# Patient Record
Sex: FEMALE | Race: WHITE | Employment: FULL TIME | URBAN - METROPOLITAN AREA
[De-identification: names, ages, dates, MRNs, and addresses within clinical notes are randomized per-mention and may not be internally consistent; named-entity substitution may affect disease eponyms.]

---

## 2017-02-06 ENCOUNTER — TELEPHONE (OUTPATIENT)
Dept: NEUROLOGY | Facility: CLINIC | Age: 27
End: 2017-02-06

## 2017-02-10 ENCOUNTER — OFFICE VISIT (OUTPATIENT)
Dept: NEUROLOGY | Facility: CLINIC | Age: 27
End: 2017-02-10

## 2017-02-10 VITALS
DIASTOLIC BLOOD PRESSURE: 82 MMHG | RESPIRATION RATE: 14 BRPM | HEART RATE: 82 BPM | WEIGHT: 157 LBS | BODY MASS INDEX: 26 KG/M2 | SYSTOLIC BLOOD PRESSURE: 110 MMHG

## 2017-02-10 DIAGNOSIS — G43.009 MIGRAINE WITHOUT AURA AND WITHOUT STATUS MIGRAINOSUS, NOT INTRACTABLE: Primary | ICD-10-CM

## 2017-02-10 PROCEDURE — 99214 OFFICE O/P EST MOD 30 MIN: CPT | Performed by: OTHER

## 2017-02-10 RX ORDER — METOPROLOL SUCCINATE 50 MG/1
50 TABLET, EXTENDED RELEASE ORAL DAILY
Qty: 30 TABLET | Refills: 0 | Status: SHIPPED | OUTPATIENT
Start: 2017-02-10 | End: 2017-04-02 | Stop reason: ALTCHOICE

## 2017-02-10 RX ORDER — SUMATRIPTAN 5 MG/1
1 SPRAY NASAL EVERY 2 HOUR PRN
Qty: 1 EACH | Refills: 2 | Status: SHIPPED | OUTPATIENT
Start: 2017-02-10 | End: 2018-02-02

## 2017-02-10 NOTE — PROGRESS NOTES
Patient here to follow up regarding migraines. States that migraines may be slightly better since last visit. Here to discuss the next steps.

## 2017-02-10 NOTE — PROGRESS NOTES
Neurology Clinic Note    Mariia Trujillo Patient Status:  No patient class for patient encounter    1990 MRN [id number]   Location [facility] Attending No att. providers found   Hosp Day #  PCP Ana Hernandez MD     Subjective:  Initial HP migraine headaches per month. HA are usually L sided. She has no visual aura. Her headaches lasted several hours to all day.  She states that she has a job which requires alternating sleeping schedule and she feels that she has more headaches following napp hyperactivity(314.01) 2/2011   • Other postprocedural status(V45.89) 9/2010   • Unspecified sinusitis (chronic) 9/2010     recurrent   • Attention deficit disorder without mention of hyperactivity 9/2010   • Palpitations    • Overdose 11/11   • Headache(78 urination or difficulty urinating   Heme: no new bruising, no unexplained fevers or chills  Endocrine: no unexplained weight loss or gain, no new fatigue  Musculoskeletal: denies back pain, denies joint pain  Psych: denies depression   Skin: denies any new lower end of normal so we will have to watch this. HEr other psychiatric medications limit use of TCA's and SNRI's for headache. She is interested in trying metoprolol as she has a friend whom takes this with good relief of HA pain. Plan:  1.  Headache

## 2017-02-10 NOTE — PATIENT INSTRUCTIONS
Refill policies:    • Allow 2 business days for refills; controlled substances may take longer.   • Contact your pharmacy at least 5 days prior to running out of medication and have them send an electronic request or submit request through the “request re your physician has recommended that you have a procedure or additional testing performed. [facility] will contact your insurance carrier to obtain pre-certification or prior authorization.     Unfortunately, ELIDIA has seen an increas

## 2017-04-02 ENCOUNTER — OFFICE VISIT (OUTPATIENT)
Dept: FAMILY MEDICINE CLINIC | Facility: CLINIC | Age: 27
End: 2017-04-02

## 2017-04-02 VITALS
HEART RATE: 104 BPM | OXYGEN SATURATION: 98 % | WEIGHT: 157 LBS | BODY MASS INDEX: 26.16 KG/M2 | SYSTOLIC BLOOD PRESSURE: 112 MMHG | TEMPERATURE: 98 F | HEIGHT: 65 IN | RESPIRATION RATE: 18 BRPM | DIASTOLIC BLOOD PRESSURE: 70 MMHG

## 2017-04-02 DIAGNOSIS — J01.01 ACUTE RECURRENT MAXILLARY SINUSITIS: Primary | ICD-10-CM

## 2017-04-02 PROCEDURE — 99213 OFFICE O/P EST LOW 20 MIN: CPT | Performed by: NURSE PRACTITIONER

## 2017-04-02 RX ORDER — AMOXICILLIN AND CLAVULANATE POTASSIUM 875; 125 MG/1; MG/1
1 TABLET, FILM COATED ORAL 2 TIMES DAILY
Qty: 20 TABLET | Refills: 0 | Status: SHIPPED | OUTPATIENT
Start: 2017-04-02 | End: 2017-04-12

## 2017-04-02 NOTE — PROGRESS NOTES
CHIEF COMPLAINT:   Patient presents with:  Cough: congestion, sinus pain x 5 days      HPI:   Mary Caldwell is a 28year old female who presents for sinus symptoms for about 1  Week now. Symptoms have been worsening since onset.  Sinus pain/pressure is l recurrent   • Attention deficit disorder without mention of hyperactivity 9/2010   • Palpitations    • Overdose 11/11   • Headache 02/1999   • Self-mutilation 2003   • Anxiety attack 9/8/09   • Sinusitis 3/29/07   • Tobacco dependence    • Pharyngitis 3/ /70 mmHg  Pulse 104  Temp(Src) 98 °F (36.7 °C) (Oral)  Resp 18  Ht 65\"  Wt 157 lb  BMI 26.13 kg/m2  SpO2 98%  LMP 03/13/2017  GENERAL: well developed, well nourished, and in no apparent distress  SKIN: no rashes, no suspicious lesions  HEAD: atrauma Patient Instructions       Acute Bacterial Rhinosinusitis (ABRS)  Acute bacterial rhinosinusitis (ABRS) is an infection of your nasal cavity and sinuses. It’s caused by bacteria. Acute means that you’ve had symptoms for less than 12 weeks.   Understanding y · Over-the-counter pain medicine. This is to lessen sinus pain and pressure. · Nasal decongestant medicine. Spray or drops may help to lessen congestion. Do not use them for more than a few days. · Salt wash (saline irrigation).  This can help to loosen m

## 2017-10-10 ENCOUNTER — OFFICE VISIT (OUTPATIENT)
Dept: FAMILY MEDICINE CLINIC | Facility: CLINIC | Age: 27
End: 2017-10-10

## 2017-10-10 VITALS — DIASTOLIC BLOOD PRESSURE: 68 MMHG | HEART RATE: 68 BPM | TEMPERATURE: 98 F | SYSTOLIC BLOOD PRESSURE: 108 MMHG

## 2017-10-10 DIAGNOSIS — J01.00 ACUTE NON-RECURRENT MAXILLARY SINUSITIS: Primary | ICD-10-CM

## 2017-10-10 DIAGNOSIS — G43.011 INTRACTABLE MIGRAINE WITHOUT AURA AND WITH STATUS MIGRAINOSUS: ICD-10-CM

## 2017-10-10 PROCEDURE — 99213 OFFICE O/P EST LOW 20 MIN: CPT | Performed by: NURSE PRACTITIONER

## 2017-10-10 RX ORDER — AMOXICILLIN AND CLAVULANATE POTASSIUM 875; 125 MG/1; MG/1
1 TABLET, FILM COATED ORAL 2 TIMES DAILY
Qty: 20 TABLET | Refills: 0 | Status: SHIPPED | OUTPATIENT
Start: 2017-10-10 | End: 2017-10-20

## 2017-10-10 NOTE — PROGRESS NOTES
CHIEF COMPLAINT:   \" Patient presents with:  Sinus Problem    HPI:   Cale Wright is a 28year old female who presents with a hx of cold sx which progressed to Maxillary sinus congestion and pressure.   Pt has been blowing out thick yellow secretions hyperactivity(314.01) 2/2011   • Attention deficit disorder without mention of hyperactivity 9/2010   • Headache(784.0) 02/1999   • Hemorrhoids, internal, with bleeding 2/13/2014   • Knee pain 7/2011   • Major depressive disorder, recurrent episode, modera pain  NEURO: See HPI    EXAM:   /68   Pulse 68   Temp 98.2 °F (36.8 °C) (Oral)   LMP 09/24/2017 (Approximate)   Breastfeeding? No   GENERAL: well developed, well nourished. Pt is mildly ill-appearing. Not in any apparent distress.    SKIN: no rashes

## 2017-10-10 NOTE — PATIENT INSTRUCTIONS
Acute Sinusitis    Acute sinusitis is irritation and swelling of the sinuses. It is usually caused by a viral infection after a common cold. Your doctor can help you find relief. What is acute sinusitis?   Sinuses are air-filled spaces in the skull behin © 8464-6250 [address], [address] [place]. All rights reserved. This information is not intended as a substitute for professional medical care. Always follow your healthcare professional's instructions.         Acute S Treatment is aimed at unblocking the sinus opening and helping the cilia work again. You may need to take antihistamine and decongestant medicine. These can reduce inflammation and decrease the amount of fluid your sinuses make.  If you have a bacterial inf

## 2017-10-13 ENCOUNTER — TELEPHONE (OUTPATIENT)
Dept: NEUROLOGY | Facility: CLINIC | Age: 27
End: 2017-10-13

## 2017-10-13 NOTE — TELEPHONE ENCOUNTER
Patient states she had daily migraine 10/6/17-10/10/17. Imitrex spray did not help. Currently taking Amitriptyline 150 mg nightly. States she is fine yesterday and today but wanted Dr Javan Santamaria to be aware.  Patient informed Dr Javan Santamaria will not be back in o

## 2017-10-18 NOTE — TELEPHONE ENCOUNTER
I called Ms. Lucio on the phone regarding her current headache. She did not answer and I left a voice message for her to call back to clinic with any questions If she is still having a bad migraine we can try a medrol dose pack.

## 2017-10-19 NOTE — TELEPHONE ENCOUNTER
Left detailed message for patient to contact the office regarding migraines/provide condition update and medication can be prescribed if needed.

## 2017-10-20 NOTE — TELEPHONE ENCOUNTER
Patient states her migraine is better. Is questioning if there is something she can do to help with prevention.

## 2017-11-13 ENCOUNTER — LAB ENCOUNTER (OUTPATIENT)
Dept: LAB | Age: 27
End: 2017-11-13
Attending: NURSE PRACTITIONER
Payer: COMMERCIAL

## 2017-11-13 DIAGNOSIS — Z01.419 PAP SMEAR, LOW-RISK: Primary | ICD-10-CM

## 2017-11-13 PROCEDURE — 87624 HPV HI-RISK TYP POOLED RSLT: CPT

## 2017-11-13 PROCEDURE — 88175 CYTOPATH C/V AUTO FLUID REDO: CPT

## 2017-11-13 PROCEDURE — 87625 HPV TYPES 16 & 18 ONLY: CPT

## 2017-11-27 ENCOUNTER — OFFICE VISIT (OUTPATIENT)
Dept: FAMILY MEDICINE CLINIC | Facility: CLINIC | Age: 27
End: 2017-11-27

## 2017-11-27 VITALS
HEIGHT: 65 IN | OXYGEN SATURATION: 97 % | TEMPERATURE: 100 F | BODY MASS INDEX: 26.66 KG/M2 | WEIGHT: 160 LBS | RESPIRATION RATE: 14 BRPM | SYSTOLIC BLOOD PRESSURE: 120 MMHG | HEART RATE: 106 BPM | DIASTOLIC BLOOD PRESSURE: 70 MMHG

## 2017-11-27 DIAGNOSIS — J01.00 ACUTE MAXILLARY SINUSITIS, RECURRENCE NOT SPECIFIED: Primary | ICD-10-CM

## 2017-11-27 DIAGNOSIS — R50.9 LOW GRADE FEVER: ICD-10-CM

## 2017-11-27 PROCEDURE — 99213 OFFICE O/P EST LOW 20 MIN: CPT | Performed by: PHYSICIAN ASSISTANT

## 2017-11-27 RX ORDER — AMITRIPTYLINE HYDROCHLORIDE 50 MG/1
50 TABLET, FILM COATED ORAL NIGHTLY
COMMUNITY
End: 2019-05-30

## 2017-11-27 RX ORDER — FLUOXETINE HYDROCHLORIDE 40 MG/1
40 CAPSULE ORAL DAILY
COMMUNITY
End: 2020-01-22 | Stop reason: ALTCHOICE

## 2017-11-27 RX ORDER — AMOXICILLIN AND CLAVULANATE POTASSIUM 875; 125 MG/1; MG/1
1 TABLET, FILM COATED ORAL 2 TIMES DAILY
Qty: 20 TABLET | Refills: 0 | Status: SHIPPED | OUTPATIENT
Start: 2017-11-27 | End: 2017-12-07

## 2017-11-27 RX ORDER — LAMOTRIGINE 100 MG/1
TABLET ORAL
Refills: 0 | COMMUNITY
Start: 2017-09-23 | End: 2018-07-19 | Stop reason: ALTCHOICE

## 2017-11-27 NOTE — PATIENT INSTRUCTIONS
Please follow up with your PCP if no improvement within 5-7 days. Go directly to the ER for any acute worsening of symptoms.    · Return to clinic or follow up with PCP for further evaluation if symptoms are not improved within 3-5 days  · Go to ER if you d eat 4-8 oz twice daily. Choose a product with the National Yogurt Association's seal such as Floy Shavonne, or Fage. · Probiotics: Capsule/granule forms such as Florastor, Florajen (refrigerated), or Culturelle.

## 2017-11-27 NOTE — PROGRESS NOTES
Shaneka Chong CHIEF COMPLAINT:   Patient presents with:  Sinus Problem: x 1 day, post nasal drainage, fever, sore throat, no cough, sinus pain/pressure. Body aches     HPI:   Tarsha Verdugo is a 28year old female who presents for sinus congestion for  1  days.  Sym Disp:  Rfl:       Past Medical History:   Diagnosis Date   • Acute otitis media 3/31/2013   • Anal fissure 3/21/2014   • Anxiety attack 9/8/09   • Attention deficit disorder with hyperactivity(314.01) 2/2011   • Attention deficit disorder without mention o appetite  SKIN: no rashes or abnormal skin lesions  HEENT: See HPI.  +sore throat  LUNGS: denies cough, shortness of breath or wheezing, See HPI  CARDIOVASCULAR: denies chest pain   GI: +nausea, denies V/C or abdominal pain  NEURO: + sinus headaches.   No n food.    The patient indicates understanding of these issues and agrees to the plan. Patient Instructions   Please follow up with your PCP if no improvement within 5-7 days. Go directly to the ER for any acute worsening of symptoms.    · Return to clinic o the gut. Take the probiotic at least 2 -3 hours after taking the antibiotic. Examples include:  · Yogurt: eat 4-8 oz twice daily. Choose a product with the [employer]urt Association's seal such as [employer], or Fage.    · Probiotics: Capsule/gran

## 2017-12-12 ENCOUNTER — OFFICE VISIT (OUTPATIENT)
Dept: FAMILY MEDICINE CLINIC | Facility: CLINIC | Age: 27
End: 2017-12-12

## 2017-12-12 VITALS
RESPIRATION RATE: 16 BRPM | DIASTOLIC BLOOD PRESSURE: 70 MMHG | SYSTOLIC BLOOD PRESSURE: 102 MMHG | TEMPERATURE: 99 F | HEART RATE: 88 BPM | OXYGEN SATURATION: 98 %

## 2017-12-12 DIAGNOSIS — J01.11 ACUTE RECURRENT FRONTAL SINUSITIS: Primary | ICD-10-CM

## 2017-12-12 DIAGNOSIS — J20.9 BRONCHITIS WITH BRONCHOSPASM: ICD-10-CM

## 2017-12-12 PROCEDURE — 94640 AIRWAY INHALATION TREATMENT: CPT | Performed by: NURSE PRACTITIONER

## 2017-12-12 PROCEDURE — 99213 OFFICE O/P EST LOW 20 MIN: CPT | Performed by: NURSE PRACTITIONER

## 2017-12-12 RX ORDER — ALBUTEROL SULFATE 90 UG/1
2 AEROSOL, METERED RESPIRATORY (INHALATION) EVERY 4 HOURS PRN
Qty: 1 INHALER | Refills: 2 | Status: SHIPPED | OUTPATIENT
Start: 2017-12-12 | End: 2018-02-02

## 2017-12-12 RX ORDER — LEVOFLOXACIN 500 MG/1
500 TABLET, FILM COATED ORAL DAILY
Qty: 10 TABLET | Refills: 0 | Status: SHIPPED | OUTPATIENT
Start: 2017-12-12 | End: 2017-12-18 | Stop reason: ALTCHOICE

## 2017-12-12 RX ORDER — CODEINE PHOSPHATE AND GUAIFENESIN 10; 100 MG/5ML; MG/5ML
SOLUTION ORAL
Qty: 120 ML | Refills: 0 | Status: SHIPPED | OUTPATIENT
Start: 2017-12-12 | End: 2018-02-02

## 2017-12-12 RX ORDER — PREDNISONE 20 MG/1
TABLET ORAL
Qty: 10 TABLET | Refills: 0 | Status: SHIPPED | OUTPATIENT
Start: 2017-12-12 | End: 2017-12-18 | Stop reason: ALTCHOICE

## 2017-12-12 RX ORDER — IPRATROPIUM BROMIDE AND ALBUTEROL SULFATE 2.5; .5 MG/3ML; MG/3ML
3 SOLUTION RESPIRATORY (INHALATION) ONCE
Status: COMPLETED | OUTPATIENT
Start: 2017-12-12 | End: 2017-12-12

## 2017-12-12 RX ADMIN — IPRATROPIUM BROMIDE AND ALBUTEROL SULFATE 3 ML: 2.5; .5 SOLUTION RESPIRATORY (INHALATION) at 16:45:00

## 2017-12-12 NOTE — PATIENT INSTRUCTIONS
Acute Bacterial Rhinosinusitis (ABRS)    Acute bacterial rhinosinusitis (ABRS) is an infection of your nasal cavity and sinuses. It’s caused by bacteria. Acute means that you’ve had symptoms for less than 4 weeks, but possibly up to 12 weeks.   Understand · Nasal corticosteroid medicine. Drops or spray used in the nose can lessen swelling and congestion. · Over-the-counter pain medicine. This is to lessen sinus pain and pressure. · Nasal decongestant medicine. Spray or drops may help to lessen congestion. Your healthcare provider has told you that you have acute bronchitis. Bronchitis is infection or inflammation of the bronchial tubes (airways in the lungs). Normally, air moves easily in and out of the airways.  Bronchitis narrows the airways, making it orly · Drink plenty of fluids, such as water, juice, or warm soup. Fluids loosen mucus so that you can cough it up. This helps you breathe more easily. Fluids also prevent dehydration. · Make sure you get plenty of rest.  · Do not smoke.  Do not allow anyone el

## 2017-12-13 NOTE — PROGRESS NOTES
CHIEF COMPLAINT:   \" Patient presents with:  Sinus Problem  Cough  Sore Throat    HPI:   Andreea Werner is a 28year old female who presents a hx of Frontal and Maxillary sinus congestion and pressure, sore throat and coughing jags that have resulted in Oral Tab Take 150 mg by mouth nightly. Disp:  Rfl:    lamoTRIgine (LAMICTAL) 25 MG Oral Tab Take 25 mg by mouth. Take 2 tablets daily  Disp:  Rfl: 0   Ranitidine HCl (ZANTAC) 150 MG Oral Tab Take 1 tablet by mouth as needed.    Disp:  Rfl:    TraZODone HCl Smoking status: Former Smoker                                                              Packs/day: 0.00      Years: 0.00         Types: Cigarettes     Quit date: 4/1/2011  Smokeless tobacco: Never Used                      Comment: < 1 PPD  Alcohol effects of Levaquin    2. Bronchitis with bronchospasm    - predniSONE 20 MG Oral Tab; Take 1 tablet po bid for 5 days  Dispense: 10 tablet; Refill: 0  - Albuterol Sulfate  (90 Base) MCG/ACT Inhalation Aero Soln;  Inhale 2 puffs into the lungs every

## 2017-12-18 ENCOUNTER — TELEPHONE (OUTPATIENT)
Dept: FAMILY MEDICINE CLINIC | Facility: CLINIC | Age: 27
End: 2017-12-18

## 2017-12-18 RX ORDER — DOXYCYCLINE HYCLATE 100 MG
100 TABLET ORAL 2 TIMES DAILY
Qty: 20 TABLET | Refills: 0 | Status: SHIPPED | OUTPATIENT
Start: 2017-12-18 | End: 2017-12-28

## 2017-12-18 NOTE — TELEPHONE ENCOUNTER
Pt calling stating she is having potential side effects to levaquin. She is unsure. She is basically just having muscle aching through the back of her neck, shoulders, and sometimes into bilat jaw.   It was prescribed on 12/12 for sinusitis and she was wa

## 2018-04-06 PROBLEM — M75.01 ADHESIVE CAPSULITIS OF RIGHT SHOULDER: Status: ACTIVE | Noted: 2018-04-06

## 2018-04-06 PROBLEM — M25.311 SHOULDER INSTABILITY, RIGHT: Status: ACTIVE | Noted: 2018-04-06

## 2018-04-25 ENCOUNTER — APPOINTMENT (OUTPATIENT)
Dept: OTHER | Facility: HOSPITAL | Age: 28
End: 2018-04-25
Attending: FAMILY MEDICINE
Payer: OTHER MISCELLANEOUS

## 2018-04-27 ENCOUNTER — OFFICE VISIT (OUTPATIENT)
Dept: OTHER | Facility: HOSPITAL | Age: 28
End: 2018-04-27
Attending: PREVENTIVE MEDICINE
Payer: OTHER MISCELLANEOUS

## 2018-04-27 DIAGNOSIS — S39.012A BACK STRAIN: Primary | ICD-10-CM

## 2018-04-27 RX ORDER — CYCLOBENZAPRINE HCL 5 MG
TABLET ORAL
Qty: 15 TABLET | Refills: 0 | Status: SHIPPED | OUTPATIENT
Start: 2018-04-27 | End: 2018-08-01

## 2018-05-04 ENCOUNTER — APPOINTMENT (OUTPATIENT)
Dept: OTHER | Facility: HOSPITAL | Age: 28
End: 2018-05-04
Attending: PREVENTIVE MEDICINE
Payer: OTHER MISCELLANEOUS

## 2018-05-09 ENCOUNTER — APPOINTMENT (OUTPATIENT)
Dept: OTHER | Facility: HOSPITAL | Age: 28
End: 2018-05-09
Attending: FAMILY MEDICINE
Payer: OTHER MISCELLANEOUS

## 2018-06-04 ENCOUNTER — LAB ENCOUNTER (OUTPATIENT)
Dept: LAB | Age: 28
End: 2018-06-04
Attending: NURSE PRACTITIONER
Payer: COMMERCIAL

## 2018-06-04 DIAGNOSIS — R87.810 CERVICAL HIGH RISK HUMAN PAPILLOMAVIRUS (HPV) DNA TEST POSITIVE: ICD-10-CM

## 2018-06-04 PROCEDURE — 87625 HPV TYPES 16 & 18 ONLY: CPT

## 2018-06-04 PROCEDURE — 87624 HPV HI-RISK TYP POOLED RSLT: CPT

## 2018-06-04 PROCEDURE — 88175 CYTOPATH C/V AUTO FLUID REDO: CPT

## 2018-07-23 ENCOUNTER — ANESTHESIA EVENT (OUTPATIENT)
Dept: SURGERY | Facility: HOSPITAL | Age: 28
End: 2018-07-23

## 2018-07-24 ENCOUNTER — SURGERY (OUTPATIENT)
Age: 28
End: 2018-07-24

## 2018-07-24 ENCOUNTER — HOSPITAL ENCOUNTER (OUTPATIENT)
Facility: HOSPITAL | Age: 28
Setting detail: HOSPITAL OUTPATIENT SURGERY
Discharge: HOME | End: 2018-07-24
Attending: OBSTETRICS & GYNECOLOGY | Admitting: OBSTETRICS & GYNECOLOGY
Payer: COMMERCIAL

## 2018-07-24 ENCOUNTER — ANESTHESIA (OUTPATIENT)
Dept: SURGERY | Facility: HOSPITAL | Age: 28
End: 2018-07-24

## 2018-07-24 VITALS
WEIGHT: 178.38 LBS | DIASTOLIC BLOOD PRESSURE: 61 MMHG | SYSTOLIC BLOOD PRESSURE: 100 MMHG | BODY MASS INDEX: 29.72 KG/M2 | RESPIRATION RATE: 16 BRPM | HEIGHT: 65 IN | OXYGEN SATURATION: 99 % | HEART RATE: 75 BPM | TEMPERATURE: 98 F

## 2018-07-24 LAB
POCT LOT NUMBER: NORMAL
POCT URINE PREGNANCY: NEGATIVE

## 2018-07-24 PROCEDURE — 88307 TISSUE EXAM BY PATHOLOGIST: CPT | Performed by: OBSTETRICS & GYNECOLOGY

## 2018-07-24 PROCEDURE — 88342 IMHCHEM/IMCYTCHM 1ST ANTB: CPT | Performed by: OBSTETRICS & GYNECOLOGY

## 2018-07-24 PROCEDURE — 81025 URINE PREGNANCY TEST: CPT | Performed by: OBSTETRICS & GYNECOLOGY

## 2018-07-24 PROCEDURE — 0UBC8ZX EXCISION OF CERVIX, VIA NATURAL OR ARTIFICIAL OPENING ENDOSCOPIC, DIAGNOSTIC: ICD-10-PCS | Performed by: OBSTETRICS & GYNECOLOGY

## 2018-07-24 RX ORDER — ONDANSETRON 2 MG/ML
4 INJECTION INTRAMUSCULAR; INTRAVENOUS EVERY 8 HOURS PRN
Status: CANCELLED | OUTPATIENT
Start: 2018-07-24

## 2018-07-24 RX ORDER — LIDOCAINE HYDROCHLORIDE AND EPINEPHRINE 10; 10 MG/ML; UG/ML
INJECTION, SOLUTION INFILTRATION; PERINEURAL AS NEEDED
Status: DISCONTINUED | OUTPATIENT
Start: 2018-07-24 | End: 2018-07-24 | Stop reason: HOSPADM

## 2018-07-24 RX ORDER — HYDROCODONE BITARTRATE AND ACETAMINOPHEN 5; 325 MG/1; MG/1
1 TABLET ORAL AS NEEDED
Status: COMPLETED | OUTPATIENT
Start: 2018-07-24 | End: 2018-07-24

## 2018-07-24 RX ORDER — IODINE SOLUTION STRONG 5% (LUGOL'S) 5 %
SOLUTION ORAL AS NEEDED
Status: DISCONTINUED | OUTPATIENT
Start: 2018-07-24 | End: 2018-07-24

## 2018-07-24 RX ORDER — ACETAMINOPHEN 500 MG
1000 TABLET ORAL EVERY 6 HOURS PRN
COMMUNITY
End: 2018-08-01 | Stop reason: ALTCHOICE

## 2018-07-24 RX ORDER — MORPHINE SULFATE 4 MG/ML
2 INJECTION, SOLUTION INTRAMUSCULAR; INTRAVENOUS EVERY 5 MIN PRN
Status: DISCONTINUED | OUTPATIENT
Start: 2018-07-24 | End: 2018-07-24

## 2018-07-24 RX ORDER — FERRIC SUBSULFATE 20-22G/100
SOLUTION, NON-ORAL MISCELLANEOUS AS NEEDED
Status: DISCONTINUED | OUTPATIENT
Start: 2018-07-24 | End: 2018-07-24

## 2018-07-24 RX ORDER — ACETAMINOPHEN 500 MG
1000 TABLET ORAL ONCE
Status: DISCONTINUED | OUTPATIENT
Start: 2018-07-24 | End: 2018-07-24 | Stop reason: HOSPADM

## 2018-07-24 RX ORDER — HYDROCODONE BITARTRATE AND ACETAMINOPHEN 5; 325 MG/1; MG/1
2 TABLET ORAL AS NEEDED
Status: COMPLETED | OUTPATIENT
Start: 2018-07-24 | End: 2018-07-24

## 2018-07-24 RX ORDER — NALOXONE HYDROCHLORIDE 0.4 MG/ML
80 INJECTION, SOLUTION INTRAMUSCULAR; INTRAVENOUS; SUBCUTANEOUS AS NEEDED
Status: DISCONTINUED | OUTPATIENT
Start: 2018-07-24 | End: 2018-07-24

## 2018-07-24 RX ORDER — DIPHENHYDRAMINE HYDROCHLORIDE 50 MG/ML
12.5 INJECTION INTRAMUSCULAR; INTRAVENOUS AS NEEDED
Status: DISCONTINUED | OUTPATIENT
Start: 2018-07-24 | End: 2018-07-24

## 2018-07-24 RX ORDER — IBUPROFEN 200 MG
400 TABLET ORAL EVERY 4 HOURS PRN
Status: CANCELLED | OUTPATIENT
Start: 2018-07-24

## 2018-07-24 RX ORDER — SODIUM CHLORIDE, SODIUM LACTATE, POTASSIUM CHLORIDE, CALCIUM CHLORIDE 600; 310; 30; 20 MG/100ML; MG/100ML; MG/100ML; MG/100ML
INJECTION, SOLUTION INTRAVENOUS CONTINUOUS
Status: DISCONTINUED | OUTPATIENT
Start: 2018-07-24 | End: 2018-07-24

## 2018-07-24 RX ORDER — ONDANSETRON 2 MG/ML
4 INJECTION INTRAMUSCULAR; INTRAVENOUS AS NEEDED
Status: DISCONTINUED | OUTPATIENT
Start: 2018-07-24 | End: 2018-07-24

## 2018-07-24 RX ORDER — ACETAMINOPHEN 500 MG
1000 TABLET ORAL ONCE AS NEEDED
Status: DISCONTINUED | OUTPATIENT
Start: 2018-07-24 | End: 2018-07-24

## 2018-07-24 RX ORDER — IBUPROFEN 200 MG
600 TABLET ORAL EVERY 4 HOURS PRN
Status: CANCELLED | OUTPATIENT
Start: 2018-07-24

## 2018-07-24 RX ORDER — IBUPROFEN 200 MG
200 TABLET ORAL EVERY 4 HOURS PRN
Status: CANCELLED | OUTPATIENT
Start: 2018-07-24

## 2018-07-24 RX ORDER — ONDANSETRON 4 MG/1
4 TABLET, FILM COATED ORAL EVERY 8 HOURS PRN
Status: CANCELLED | OUTPATIENT
Start: 2018-07-24

## 2018-07-24 RX ORDER — MEPERIDINE HYDROCHLORIDE 25 MG/ML
12.5 INJECTION INTRAMUSCULAR; INTRAVENOUS; SUBCUTANEOUS AS NEEDED
Status: DISCONTINUED | OUTPATIENT
Start: 2018-07-24 | End: 2018-07-24

## 2018-07-24 RX ORDER — HYDROCODONE BITARTRATE AND ACETAMINOPHEN 5; 325 MG/1; MG/1
TABLET ORAL
Status: DISCONTINUED
Start: 2018-07-24 | End: 2018-07-24

## 2018-07-24 NOTE — OPERATIVE REPORT
OPERATIVE REPORT   PREOPERATIVE DIAGNOSIS: Cervical dysplasia(CIN3)  POSTOPERATIVE DIAGNOSIS: Same. PROCEDURE PERFORMED:     1. Loop electrocautery excision procedure.      2. Colposcopy  SURGEON:  Arlene ROD  ANESTHESIA: Monitored anesthetic

## 2018-07-24 NOTE — ANESTHESIA POSTPROCEDURE EVALUATION
[address] [place] [address] Patient Status:  Hospital Outpatient Surgery   Age/Gender 33year old female MRN [id number]   Location [address] [place] [address] Attending Nandini Toth MD   Hosp Day # 0 PCP Calos Cee MD       Anesth

## 2018-07-24 NOTE — H&P
[address] [facility] Patient Status:  Hospital Outpatient Surgery    1990 MRN [id number]   Location [address] [place] [address] Attending Heidi Riley MD   Hosp Day # 0 PCP Fransisca Torres MD     [id number] [facility] mention of hyperactivity 9/2010   • Headache(784.0) 02/1999   • Hemorrhoids, internal, with bleeding 2/13/2014   • Knee pain 7/2011   • Major depressive disorder, recurrent episode, [facility] ([facility] 12/2011   • Midepigastric pain 7/2011   • Migraines    • Ot 24 hours ending 07/24/18 0738    Physical Exam:  General: Alert, orientated x3. .  Vital Signs:  Blood pressure 110/77, pulse 88, temperature 98.7 °F (37.1 °C), temperature source Oral, resp.  rate 14, height 5' 5\" (1.651 m), weight 178 lb 5.6 oz (80.9 kg) Anam Whitfield MD  7/24/2018  7:38 AM

## 2018-07-24 NOTE — ANESTHESIA PREPROCEDURE EVALUATION
PRE-OP EVALUATION    Patient Name: Ciara Joe    Pre-op Diagnosis: CERVICAL DYSPLASIA    Procedure(s):  LOOP ELECTRICAL EXCISION PROCEDURE COLPOSCOPY    Surgeon(s) and Role:     * Sara Kwong MD - Primary    Pre-op vitals reviewed.         Body disorder with hyperactivity(314.01) Other postprocedural status(V45.89)  Unspecified sinusitis (chronic) Attention deficit disorder without mention of hyperactivity  Palpitations Overdose  Headache(784.0) Self-mutilation  Anxiety attack Sinusitis  Tobacco

## 2018-08-01 ENCOUNTER — OFFICE VISIT (OUTPATIENT)
Dept: INTERNAL MEDICINE CLINIC | Facility: CLINIC | Age: 28
End: 2018-08-01
Payer: COMMERCIAL

## 2018-08-01 ENCOUNTER — LAB ENCOUNTER (OUTPATIENT)
Dept: LAB | Age: 28
End: 2018-08-01
Attending: NURSE PRACTITIONER
Payer: COMMERCIAL

## 2018-08-01 VITALS
DIASTOLIC BLOOD PRESSURE: 64 MMHG | BODY MASS INDEX: 29.36 KG/M2 | RESPIRATION RATE: 16 BRPM | HEIGHT: 65.5 IN | WEIGHT: 178.38 LBS | SYSTOLIC BLOOD PRESSURE: 96 MMHG | HEART RATE: 88 BPM

## 2018-08-01 DIAGNOSIS — Z51.81 ENCOUNTER FOR THERAPEUTIC DRUG MONITORING: Primary | ICD-10-CM

## 2018-08-01 DIAGNOSIS — Z51.81 ENCOUNTER FOR THERAPEUTIC DRUG MONITORING: ICD-10-CM

## 2018-08-01 LAB
ALBUMIN SERPL-MCNC: 3.4 G/DL (ref 3.5–4.8)
ALBUMIN/GLOB SERPL: 0.9 {RATIO} (ref 1–2)
ALP LIVER SERPL-CCNC: 86 U/L (ref 37–98)
ALT SERPL-CCNC: 28 U/L (ref 14–54)
ANION GAP SERPL CALC-SCNC: 7 MMOL/L (ref 0–18)
AST SERPL-CCNC: 15 U/L (ref 15–41)
BASOPHILS # BLD AUTO: 0.06 X10(3) UL (ref 0–0.1)
BASOPHILS NFR BLD AUTO: 0.5 %
BILIRUB SERPL-MCNC: 0.2 MG/DL (ref 0.1–2)
BUN BLD-MCNC: 9 MG/DL (ref 8–20)
BUN/CREAT SERPL: 13.2 (ref 10–20)
CALCIUM BLD-MCNC: 9 MG/DL (ref 8.3–10.3)
CHLORIDE SERPL-SCNC: 104 MMOL/L (ref 101–111)
CHOLEST SMN-MCNC: 221 MG/DL (ref ?–200)
CO2 SERPL-SCNC: 26 MMOL/L (ref 22–32)
CREAT BLD-MCNC: 0.68 MG/DL (ref 0.55–1.02)
EOSINOPHIL # BLD AUTO: 0.58 X10(3) UL (ref 0–0.3)
EOSINOPHIL NFR BLD AUTO: 4.7 %
ERYTHROCYTE [DISTWIDTH] IN BLOOD BY AUTOMATED COUNT: 11.7 % (ref 11.5–16)
EST. AVERAGE GLUCOSE BLD GHB EST-MCNC: 108 MG/DL (ref 68–126)
GLOBULIN PLAS-MCNC: 3.7 G/DL (ref 2.5–3.7)
GLUCOSE BLD-MCNC: 90 MG/DL (ref 70–99)
HAV AB SERPL IA-ACNC: 277 PG/ML (ref 193–986)
HBA1C MFR BLD HPLC: 5.4 % (ref ?–5.7)
HCT VFR BLD AUTO: 38.8 % (ref 34–50)
HDLC SERPL-MCNC: 36 MG/DL (ref 40–59)
HGB BLD-MCNC: 12.5 G/DL (ref 12–16)
IMMATURE GRANULOCYTE COUNT: 0.09 X10(3) UL (ref 0–1)
IMMATURE GRANULOCYTE RATIO %: 0.7 %
LDLC SERPL CALC-MCNC: 126 MG/DL (ref ?–100)
LYMPHOCYTES # BLD AUTO: 3.57 X10(3) UL (ref 0.9–4)
LYMPHOCYTES NFR BLD AUTO: 28.8 %
M PROTEIN MFR SERPL ELPH: 7.1 G/DL (ref 6.1–8.3)
MCH RBC QN AUTO: 29.8 PG (ref 27–33.2)
MCHC RBC AUTO-ENTMCNC: 32.2 G/DL (ref 31–37)
MCV RBC AUTO: 92.4 FL (ref 81–100)
MONOCYTES # BLD AUTO: 0.79 X10(3) UL (ref 0.1–1)
MONOCYTES NFR BLD AUTO: 6.4 %
NEUTROPHIL ABS PRELIM: 7.32 X10 (3) UL (ref 1.3–6.7)
NEUTROPHILS # BLD AUTO: 7.32 X10(3) UL (ref 1.3–6.7)
NEUTROPHILS NFR BLD AUTO: 58.9 %
NONHDLC SERPL-MCNC: 185 MG/DL (ref ?–130)
OSMOLALITY SERPL CALC.SUM OF ELEC: 282 MOSM/KG (ref 275–295)
PLATELET # BLD AUTO: 276 10(3)UL (ref 150–450)
POTASSIUM SERPL-SCNC: 4.3 MMOL/L (ref 3.6–5.1)
RBC # BLD AUTO: 4.2 X10(6)UL (ref 3.8–5.1)
RED CELL DISTRIBUTION WIDTH-SD: 39.5 FL (ref 35.1–46.3)
SODIUM SERPL-SCNC: 137 MMOL/L (ref 136–144)
TRIGL SERPL-MCNC: 296 MG/DL (ref 30–149)
VIT D+METAB SERPL-MCNC: 38.8 NG/ML (ref 30–100)
VLDLC SERPL CALC-MCNC: 59 MG/DL (ref 0–30)
WBC # BLD AUTO: 12.4 X10(3) UL (ref 4–13)

## 2018-08-01 PROCEDURE — 82397 CHEMILUMINESCENT ASSAY: CPT

## 2018-08-01 PROCEDURE — 99214 OFFICE O/P EST MOD 30 MIN: CPT | Performed by: NURSE PRACTITIONER

## 2018-08-01 PROCEDURE — 82306 VITAMIN D 25 HYDROXY: CPT

## 2018-08-01 PROCEDURE — 80053 COMPREHEN METABOLIC PANEL: CPT

## 2018-08-01 PROCEDURE — 83036 HEMOGLOBIN GLYCOSYLATED A1C: CPT

## 2018-08-01 PROCEDURE — 82607 VITAMIN B-12: CPT

## 2018-08-01 PROCEDURE — 85025 COMPLETE CBC W/AUTO DIFF WBC: CPT

## 2018-08-01 PROCEDURE — 80061 LIPID PANEL: CPT

## 2018-08-01 RX ORDER — TOPIRAMATE 25 MG/1
25 TABLET ORAL 2 TIMES DAILY
Qty: 60 TABLET | Refills: 0 | Status: SHIPPED | OUTPATIENT
Start: 2018-08-01 | End: 2018-08-31

## 2018-08-01 NOTE — PROGRESS NOTES
HISTORY OF PRESENT ILLNESS  Patient presents with:  Weight Check: New patient. Referred by co worker. Never tried weight loss medication.      Kelly Ackerman is a 33year old female new to our office today for initiation of medical weight loss program. ETOH use: special occasions   Supplements: none  Exercise/Activity: none   Stress level: 7/10  Sleep hours and integrity: 4-5 Hours per night    MEDICAL HISTORY  PMH reviewed:   Cardiac disorders:negative   +insomnia   Diabetes: negative  Thyroid disease affect      Lab Results  Component Value Date   WBC 7.1 09/14/2016   RBC 4.20 09/14/2016   HGB 12.5 09/14/2016   HCT 38.0 09/14/2016   MCV 90.5 09/14/2016   MCH 29.8 09/14/2016   MCHC 32.9 09/14/2016   RDW 11.2 (L) 09/14/2016   .0 09/14/2016   MPV 1 HEMOGLOBIN A1C,         LEPTIN, SERUM, LIPID PANEL, topiramate 25 MG         Oral Tab    Initial Weight Data and Goal Weight Loss:  Weight Calculations  Initial Weight: 178 lbs  Initial Weight Date: 08/01/18  Today's Weight: 178 lbs  5% Goal: 8.9  10% Goal control  -Limit carbohydrates  -Eat breakfast every day   -Don't skip meals   -Read nutrition labels and keep a food log  -drink a lot of water 65 oz of water per day  - Do not drink your calories (no soda, juice, high calorie coffee drinks, limit alcohol) are eating the right amount of calories, protein, and carbs. When you set the johnnie up chose 1-2 lbs/week as a goal.  2. \"7 minute workout\" to help with exercise/activity which takes 7 minutes of your day and that you can do at home! 3.  \"Calm\" which he

## 2018-08-01 NOTE — PATIENT INSTRUCTIONS
Plan:  Continue with medications: Topamax: take 1 tablet before dinner for the first week (to decrease side effects) and than the second week--> increase to 1 tablet in the morning and 1 tablet before dinner (2 tablets per day)   Go to the lab for blood wo

## 2018-08-02 ENCOUNTER — OFFICE VISIT (OUTPATIENT)
Dept: FAMILY MEDICINE CLINIC | Facility: CLINIC | Age: 28
End: 2018-08-02
Payer: COMMERCIAL

## 2018-08-02 VITALS
DIASTOLIC BLOOD PRESSURE: 60 MMHG | RESPIRATION RATE: 16 BRPM | SYSTOLIC BLOOD PRESSURE: 102 MMHG | HEART RATE: 80 BPM | TEMPERATURE: 99 F | OXYGEN SATURATION: 98 %

## 2018-08-02 DIAGNOSIS — J01.00 ACUTE NON-RECURRENT MAXILLARY SINUSITIS: Primary | ICD-10-CM

## 2018-08-02 DIAGNOSIS — J40 BRONCHITIS: ICD-10-CM

## 2018-08-02 PROCEDURE — 99213 OFFICE O/P EST LOW 20 MIN: CPT | Performed by: NURSE PRACTITIONER

## 2018-08-02 RX ORDER — PREDNISONE 20 MG/1
TABLET ORAL
Qty: 10 TABLET | Refills: 0 | Status: SHIPPED | OUTPATIENT
Start: 2018-08-02 | End: 2018-11-06 | Stop reason: ALTCHOICE

## 2018-08-02 RX ORDER — TRIAMCINOLONE ACETONIDE 55 UG/1
2 SPRAY, METERED NASAL DAILY
Qty: 1 INHALER | Refills: 2 | Status: SHIPPED | OUTPATIENT
Start: 2018-08-02 | End: 2018-09-23 | Stop reason: ALTCHOICE

## 2018-08-02 RX ORDER — AMOXICILLIN AND CLAVULANATE POTASSIUM 875; 125 MG/1; MG/1
1 TABLET, FILM COATED ORAL 2 TIMES DAILY
Qty: 20 TABLET | Refills: 0 | Status: SHIPPED | OUTPATIENT
Start: 2018-08-02 | End: 2018-08-12

## 2018-08-02 NOTE — PROGRESS NOTES
CHIEF COMPLAINT:   \" Patient presents with:  Sinus Problem  Cough    HPI:   Marianela Douglas is a 33year old female who presents with a hx of Frontal and Maxillary sinus and nasal congestion and pressure for about 3 weeks.   Pt had not felt too ill and episode, moderate ([facility] 12/2011   • Midepigastric pain 7/2011   • Migraines    • Other postprocedural status(V45.89) 9/2010   • Overdose 11/11   • Pain in joint, lower leg 10/1/2012   • Palpitations    • Pharyngitis 3/09    recurrent   • Rectal pain 3/21/2 no rashes,no suspicious lesions  HEAD: atraumatic, normocephalic. EYES: conjunctiva clear, EOM intact  EARS: TM's are fluid-filled, bulging and non-injected, bilaterally  NOSE: No exudates, nasal mucosa is erythematous and swollen.   Tender Maxillary sinu

## 2018-08-07 LAB — LEPTIN: 29.3 NG/ML

## 2018-08-08 NOTE — PROGRESS NOTES
Elevated leptin   a1c stable  cmp stable  Low Vitamin b level--> would recommend vit b injections monthly X 6 months (please order)    Cholesterol elevated, total cholesterol and triglycerides, low HDL, high LDL--> constitutes a coronary heart disease risk

## 2018-09-02 ENCOUNTER — HOSPITAL ENCOUNTER (EMERGENCY)
Facility: HOSPITAL | Age: 28
Discharge: HOME | End: 2018-09-02
Attending: EMERGENCY MEDICINE
Payer: COMMERCIAL

## 2018-09-02 ENCOUNTER — APPOINTMENT (OUTPATIENT)
Dept: CT IMAGING | Facility: HOSPITAL | Age: 28
End: 2018-09-02
Attending: EMERGENCY MEDICINE
Payer: COMMERCIAL

## 2018-09-02 VITALS
BODY MASS INDEX: 29.66 KG/M2 | RESPIRATION RATE: 14 BRPM | TEMPERATURE: 97 F | WEIGHT: 178 LBS | HEART RATE: 67 BPM | OXYGEN SATURATION: 99 % | HEIGHT: 65 IN | DIASTOLIC BLOOD PRESSURE: 74 MMHG | SYSTOLIC BLOOD PRESSURE: 110 MMHG

## 2018-09-02 DIAGNOSIS — R10.13 EPIGASTRIC PAIN: Primary | ICD-10-CM

## 2018-09-02 DIAGNOSIS — K29.80 DUODENITIS: ICD-10-CM

## 2018-09-02 LAB
ALBUMIN SERPL-MCNC: 3.5 G/DL (ref 3.5–4.8)
ALBUMIN/GLOB SERPL: 0.9 {RATIO} (ref 1–2)
ALP LIVER SERPL-CCNC: 95 U/L (ref 37–98)
ALT SERPL-CCNC: 29 U/L (ref 14–54)
ANION GAP SERPL CALC-SCNC: 8 MMOL/L (ref 0–18)
AST SERPL-CCNC: 25 U/L (ref 15–41)
BASOPHILS # BLD AUTO: 0.03 X10(3) UL (ref 0–0.1)
BASOPHILS NFR BLD AUTO: 0.3 %
BILIRUB SERPL-MCNC: 0.3 MG/DL (ref 0.1–2)
BILIRUB UR QL STRIP.AUTO: NEGATIVE
BUN BLD-MCNC: 4 MG/DL (ref 8–20)
BUN/CREAT SERPL: 6.9 (ref 10–20)
CALCIUM BLD-MCNC: 9 MG/DL (ref 8.3–10.3)
CHLORIDE SERPL-SCNC: 105 MMOL/L (ref 101–111)
CO2 SERPL-SCNC: 27 MMOL/L (ref 22–32)
COLOR UR AUTO: YELLOW
CREAT BLD-MCNC: 0.58 MG/DL (ref 0.55–1.02)
EOSINOPHIL # BLD AUTO: 0.37 X10(3) UL (ref 0–0.3)
EOSINOPHIL NFR BLD AUTO: 3.5 %
ERYTHROCYTE [DISTWIDTH] IN BLOOD BY AUTOMATED COUNT: 11.6 % (ref 11.5–16)
GLOBULIN PLAS-MCNC: 3.9 G/DL (ref 2.5–4)
GLUCOSE BLD-MCNC: 102 MG/DL (ref 70–99)
GLUCOSE UR STRIP.AUTO-MCNC: NEGATIVE MG/DL
HCT VFR BLD AUTO: 40.2 % (ref 34–50)
HGB BLD-MCNC: 13.3 G/DL (ref 12–16)
IMMATURE GRANULOCYTE COUNT: 0.06 X10(3) UL (ref 0–1)
IMMATURE GRANULOCYTE RATIO %: 0.6 %
KETONES UR STRIP.AUTO-MCNC: NEGATIVE MG/DL
LEUKOCYTE ESTERASE UR QL STRIP.AUTO: NEGATIVE
LIPASE: 78 U/L (ref 73–393)
LYMPHOCYTES # BLD AUTO: 2.15 X10(3) UL (ref 0.9–4)
LYMPHOCYTES NFR BLD AUTO: 20.5 %
M PROTEIN MFR SERPL ELPH: 7.4 G/DL (ref 6.1–8.3)
MCH RBC QN AUTO: 30 PG (ref 27–33.2)
MCHC RBC AUTO-ENTMCNC: 33.1 G/DL (ref 31–37)
MCV RBC AUTO: 90.7 FL (ref 81–100)
MONOCYTES # BLD AUTO: 0.62 X10(3) UL (ref 0.1–1)
MONOCYTES NFR BLD AUTO: 5.9 %
NEUTROPHIL ABS PRELIM: 7.28 X10 (3) UL (ref 1.3–6.7)
NEUTROPHILS # BLD AUTO: 7.28 X10(3) UL (ref 1.3–6.7)
NEUTROPHILS NFR BLD AUTO: 69.2 %
NITRITE UR QL STRIP.AUTO: NEGATIVE
OSMOLALITY SERPL CALC.SUM OF ELEC: 287 MOSM/KG (ref 275–295)
PH UR STRIP.AUTO: 5 [PH] (ref 4.5–8)
PLATELET # BLD AUTO: 294 10(3)UL (ref 150–450)
POCT URINE PREGNANCY: NEGATIVE
POTASSIUM SERPL-SCNC: 4.2 MMOL/L (ref 3.6–5.1)
PROCEDURE CONTROL: NORMAL
PROT UR STRIP.AUTO-MCNC: NEGATIVE MG/DL
RBC # BLD AUTO: 4.43 X10(6)UL (ref 3.8–5.1)
RED CELL DISTRIBUTION WIDTH-SD: 38.2 FL (ref 35.1–46.3)
SODIUM SERPL-SCNC: 140 MMOL/L (ref 136–144)
SP GR UR STRIP.AUTO: 1.01 (ref 1–1.03)
UROBILINOGEN UR STRIP.AUTO-MCNC: <2 MG/DL
WBC # BLD AUTO: 10.5 X10(3) UL (ref 4–13)

## 2018-09-02 PROCEDURE — 96374 THER/PROPH/DIAG INJ IV PUSH: CPT

## 2018-09-02 PROCEDURE — 83690 ASSAY OF LIPASE: CPT | Performed by: EMERGENCY MEDICINE

## 2018-09-02 PROCEDURE — 85025 COMPLETE CBC W/AUTO DIFF WBC: CPT | Performed by: EMERGENCY MEDICINE

## 2018-09-02 PROCEDURE — 81001 URINALYSIS AUTO W/SCOPE: CPT | Performed by: EMERGENCY MEDICINE

## 2018-09-02 PROCEDURE — 81025 URINE PREGNANCY TEST: CPT

## 2018-09-02 PROCEDURE — 99285 EMERGENCY DEPT VISIT HI MDM: CPT

## 2018-09-02 PROCEDURE — 96361 HYDRATE IV INFUSION ADD-ON: CPT

## 2018-09-02 PROCEDURE — 74177 CT ABD & PELVIS W/CONTRAST: CPT | Performed by: EMERGENCY MEDICINE

## 2018-09-02 PROCEDURE — 80053 COMPREHEN METABOLIC PANEL: CPT | Performed by: EMERGENCY MEDICINE

## 2018-09-02 PROCEDURE — 99284 EMERGENCY DEPT VISIT MOD MDM: CPT

## 2018-09-02 RX ORDER — ONDANSETRON 2 MG/ML
4 INJECTION INTRAMUSCULAR; INTRAVENOUS ONCE
Status: COMPLETED | OUTPATIENT
Start: 2018-09-02 | End: 2018-09-02

## 2018-09-02 RX ORDER — PANTOPRAZOLE SODIUM 40 MG/1
40 TABLET, DELAYED RELEASE ORAL DAILY
Qty: 30 TABLET | Refills: 0 | Status: SHIPPED | OUTPATIENT
Start: 2018-09-02 | End: 2018-10-02

## 2018-09-02 RX ORDER — SODIUM CHLORIDE 9 MG/ML
INJECTION, SOLUTION INTRAVENOUS CONTINUOUS
Status: DISCONTINUED | OUTPATIENT
Start: 2018-09-02 | End: 2018-09-02

## 2018-09-02 RX ORDER — ONDANSETRON 4 MG/1
4 TABLET, ORALLY DISINTEGRATING ORAL EVERY 4 HOURS PRN
Qty: 10 TABLET | Refills: 0 | Status: SHIPPED | OUTPATIENT
Start: 2018-09-02 | End: 2018-09-09

## 2018-09-02 NOTE — ED PROVIDER NOTES
Patient Seen in: [facility] Emergency Department    History   Patient presents with:  Abdomen/Flank Pain (GI/)    Stated Complaint: abd pain, n/v/d since 10pm last night    HPI    Patient is a pleasant 59-year-old female, presenting for evaluation balloon maxillary antrostomiesbilat                endo max sinus lavage and inferior turbinate                submucous resection and outfracture.         Smoking status: Former Smoker                                                              Packs/day: identified.       ED Course     Labs Reviewed   COMP METABOLIC PANEL (14) - Abnormal; Notable for the following:        Result Value    Glucose 102 (*)     BUN 4 (*)     BUN/CREA Ratio 6.9 (*)     A/G Ratio 0.9 (*)     All other components within normal ghosh transmitted to the  [facility]) [address]on. Chris Mello [address] ([address]) which includes the Dose Index Registry.   PATIENT STATED HISTORY:(As transcribed by Technologist)  Patient complains of bilateral upper quadrant pain and nausea w 1710  ------------------------------------------------------------      MDM     Patient was placed on a cart, an IV was established, and blood was drawn and sent to the laboratory for further evaluation. An infusion of normal saline was initiated.  Patient Refills: 0

## 2018-09-02 NOTE — ED NOTES
Warm blankets issued stating she feels better with intermittent abdominal pain. Unable to give a stool specimen at this time. Pt stating she took imodium this morning.

## 2018-09-02 NOTE — ED INITIAL ASSESSMENT (HPI)
Patient presents with abdominal pain and NVD since 10pm last night. No abnormal PO intake, she did eat nachos with cheese sauce from a carnival yesterday. She does report she has been taking more ibuprofen than normal recently.  She's been taking 400mg of i

## 2018-09-23 ENCOUNTER — OFFICE VISIT (OUTPATIENT)
Dept: FAMILY MEDICINE CLINIC | Facility: CLINIC | Age: 28
End: 2018-09-23
Payer: COMMERCIAL

## 2018-09-23 VITALS
HEIGHT: 65 IN | DIASTOLIC BLOOD PRESSURE: 70 MMHG | RESPIRATION RATE: 14 BRPM | TEMPERATURE: 99 F | OXYGEN SATURATION: 97 % | HEART RATE: 88 BPM | WEIGHT: 178 LBS | SYSTOLIC BLOOD PRESSURE: 110 MMHG | BODY MASS INDEX: 29.66 KG/M2

## 2018-09-23 DIAGNOSIS — J01.90 ACUTE RHINOSINUSITIS: Primary | ICD-10-CM

## 2018-09-23 DIAGNOSIS — H65.01 RIGHT ACUTE SEROUS OTITIS MEDIA, RECURRENCE NOT SPECIFIED: ICD-10-CM

## 2018-09-23 PROCEDURE — 99213 OFFICE O/P EST LOW 20 MIN: CPT | Performed by: PHYSICIAN ASSISTANT

## 2018-09-23 RX ORDER — PREDNISONE 20 MG/1
40 TABLET ORAL DAILY
Qty: 10 TABLET | Refills: 0 | Status: SHIPPED | OUTPATIENT
Start: 2018-09-23 | End: 2018-09-28

## 2018-09-23 RX ORDER — TRIAMCINOLONE ACETONIDE 55 UG/1
2 SPRAY, METERED NASAL DAILY
Qty: 1 INHALER | Refills: 0 | Status: SHIPPED | OUTPATIENT
Start: 2018-09-23 | End: 2020-01-22 | Stop reason: ALTCHOICE

## 2018-09-23 NOTE — PROGRESS NOTES
CHIEF COMPLAINT:   Patient presents with:  Sinus Problem: R ear ache x a few days, sinus issues x 1 month. no sinus pain, +pressure, clear and yellow      HPI:   Lori Chavez is a 33year old female who presents for sinus congestion for a month.  Sym Past Medical History:  3/31/2013: Acute otitis media  3/21/2014:  Anal fissure  9/8/09: Anxiety attack  2/2011: Attention deficit disorder with hyperactivity(314.01)  9/2010: Attention deficit disorder without mention of hyperactivity  02/1999: ELEN(494 REVIEW OF SYSTEMS:   GENERAL: feels well otherwise, no unplanned weight change,  normal appetite  SKIN: no rashes or abnormal skin lesions  HEENT: See HPI.     LUNGS: denies shortness of breath or wheezing, See HPI  CARDIOVASCULAR: denies chest pain o Risks, benefits, side effects of medication addressed and explained. The patient indicates understanding of these issues and agrees to the plan.   Meds & Refills for this Visit:  Requested Prescriptions     Signed Prescriptions Disp Refills   • Nikky If your OME doesn't improve after 3 months, surgery may be used to drain the fluid and insert a small tube in the eardrum to allow continued drainage.   Because the middle ear fluid can become infected, it is important to watch for signs of an ear infection © 5763-5034 [employer] [address]. [address] [facility], [address] [place]. All rights reserved. This information is not intended as a substitute for professional medical care. Always follow your healthcare professional's instructions.         The sin · Over-the-counter antihistamines may help if allergies contributed to your sinusitis.    · Use acetaminophen or ibuprofen to control pain, unless another pain medicine was prescribed to you.  If you have chronic liver or kidney disease or ever had a stomac

## 2018-09-23 NOTE — PATIENT INSTRUCTIONS
Please follow up with your PCP if no improvement within 5-7 days. Go directly to the ER for any acute worsening of symptoms. Earache, No Infection (Adult)  Earaches can happen without an infection.  This occurs when air and fluid build up behind the ear doctor before using these medicines. Aspirin should never be used in anyone under 25years of age who is ill with a fever. It may cause severe liver damage. · You may use over-the-counter decongestants such as phenylephrine or pseudoephedrine.  But they ar water, hot tea, and other liquids. This may help thin nasal mucus. It also may help your sinuses drain fluids. · Heat may help soothe painful areas of your face. Use a towel soaked in hot water.  Or,  the shower and direct the warm spray onto your gets worse  · Stiff neck  · Unusual drowsiness or confusion  · Swelling of your forehead or eyelids  · Vision problems, such as blurred or double vision  · Fever of 100.4ºF (38ºC) or higher, or as directed by your healthcare provider  · Seizure  · Breathin

## 2018-10-16 ENCOUNTER — TELEPHONE (OUTPATIENT)
Dept: INTERNAL MEDICINE CLINIC | Facility: CLINIC | Age: 28
End: 2018-10-16

## 2018-10-16 ENCOUNTER — APPOINTMENT (OUTPATIENT)
Dept: LAB | Age: 28
End: 2018-10-16
Attending: INTERNAL MEDICINE
Payer: COMMERCIAL

## 2018-10-16 DIAGNOSIS — E28.8 OTHER OVARIAN DYSFUNCTION: ICD-10-CM

## 2018-10-16 PROCEDURE — 36415 COLL VENOUS BLD VENIPUNCTURE: CPT

## 2018-10-16 PROCEDURE — 84144 ASSAY OF PROGESTERONE: CPT

## 2018-10-16 NOTE — TELEPHONE ENCOUNTER
Pt dropped off request for medical records to Dr Catherine Nam [facility] Special[facility]. [facility] for pt to see if she needs the entire chart or if the last 3-5 years would suffice.

## 2018-10-16 NOTE — TELEPHONE ENCOUNTER
Per pt last 5 years medical records requested for Dr Jeremiah Jackson [facility]ist. Forwarded to [facility] 10-16-18. See med rec scan dated 10-16-18.

## 2018-11-06 ENCOUNTER — OFFICE VISIT (OUTPATIENT)
Dept: FAMILY MEDICINE CLINIC | Facility: CLINIC | Age: 28
End: 2018-11-06
Payer: COMMERCIAL

## 2018-11-06 VITALS — RESPIRATION RATE: 16 BRPM | HEART RATE: 101 BPM | TEMPERATURE: 99 F | OXYGEN SATURATION: 96 %

## 2018-11-06 DIAGNOSIS — R09.81 NASAL SINUS CONGESTION: Primary | ICD-10-CM

## 2018-11-06 PROCEDURE — 99213 OFFICE O/P EST LOW 20 MIN: CPT | Performed by: NURSE PRACTITIONER

## 2018-11-06 RX ORDER — AMOXICILLIN AND CLAVULANATE POTASSIUM 875; 125 MG/1; MG/1
1 TABLET, FILM COATED ORAL 2 TIMES DAILY
Qty: 14 TABLET | Refills: 0 | Status: SHIPPED | OUTPATIENT
Start: 2018-11-09 | End: 2018-11-16

## 2018-11-06 NOTE — PROGRESS NOTES
CHIEF COMPLAINT:   Patient presents with:  URI      HPI:   Estela Dexter is a 33year old female who presents for upper respiratory symptoms for 3 days.  3 days going on 4 now with post nasal drip, yesterday evening with rhinorrhea, over night with ti Pharyngitis 3/09    recurrent   • Rectal pain 3/21/2014   • Self-mutilation 2003   • Sinusitis 3/29/07   • Suicidal ideation 2/7/11   • Tobacco dependence    • Unspecified sinusitis (chronic) 9/2010    recurrent   • URI (upper respiratory infection) 3/31/2 PND  NECK: Supple, non-tender  LUNGS: clear to auscultation bilaterally, no wheezes or rhonchi. Breathing is non labored. CARDIO: RRR without murmur  EXTREMITIES: no cyanosis, clubbing or edema  LYMPH: No pre/post cervical lymphadenopathy.   No pre/post au

## 2018-11-07 RX ORDER — AMOXICILLIN AND CLAVULANATE POTASSIUM 875; 125 MG/1; MG/1
1 TABLET, FILM COATED ORAL 2 TIMES DAILY
Qty: 20 TABLET | Refills: 0 | Status: SHIPPED | OUTPATIENT
Start: 2018-11-07 | End: 2018-11-17

## 2018-11-20 ENCOUNTER — HOSPITAL ENCOUNTER (OUTPATIENT)
Dept: GENERAL RADIOLOGY | Facility: HOSPITAL | Age: 28
Discharge: HOME | End: 2018-11-20
Attending: OBSTETRICS & GYNECOLOGY
Payer: COMMERCIAL

## 2018-11-20 ENCOUNTER — LAB ENCOUNTER (OUTPATIENT)
Dept: LAB | Facility: HOSPITAL | Age: 28
End: 2018-11-20
Attending: OBSTETRICS & GYNECOLOGY
Payer: COMMERCIAL

## 2018-11-20 DIAGNOSIS — N97.9 INFERTILITY, FEMALE: Primary | ICD-10-CM

## 2018-11-20 DIAGNOSIS — Z01.818 PREOP EXAMINATION: ICD-10-CM

## 2018-11-20 PROCEDURE — 74740 X-RAY FEMALE GENITAL TRACT: CPT | Performed by: OBSTETRICS & GYNECOLOGY

## 2018-11-20 PROCEDURE — 58340 CATHETER FOR HYSTEROGRAPHY: CPT | Performed by: OBSTETRICS & GYNECOLOGY

## 2018-11-23 ENCOUNTER — OFFICE VISIT (OUTPATIENT)
Dept: FAMILY MEDICINE CLINIC | Facility: CLINIC | Age: 28
End: 2018-11-23
Payer: COMMERCIAL

## 2018-11-23 VITALS
OXYGEN SATURATION: 98 % | BODY MASS INDEX: 30 KG/M2 | TEMPERATURE: 98 F | HEART RATE: 85 BPM | DIASTOLIC BLOOD PRESSURE: 74 MMHG | WEIGHT: 178 LBS | SYSTOLIC BLOOD PRESSURE: 104 MMHG | RESPIRATION RATE: 15 BRPM

## 2018-11-23 DIAGNOSIS — R30.0 DYSURIA: Primary | ICD-10-CM

## 2018-11-23 PROCEDURE — 87086 URINE CULTURE/COLONY COUNT: CPT | Performed by: NURSE PRACTITIONER

## 2018-11-23 PROCEDURE — 87088 URINE BACTERIA CULTURE: CPT | Performed by: NURSE PRACTITIONER

## 2018-11-23 PROCEDURE — 87186 SC STD MICRODIL/AGAR DIL: CPT | Performed by: NURSE PRACTITIONER

## 2018-11-23 PROCEDURE — 87184 SC STD DISK METHOD PER PLATE: CPT | Performed by: NURSE PRACTITIONER

## 2018-11-23 PROCEDURE — 99213 OFFICE O/P EST LOW 20 MIN: CPT | Performed by: NURSE PRACTITIONER

## 2018-11-23 RX ORDER — PHENAZOPYRIDINE HYDROCHLORIDE 100 MG/1
100 TABLET, FILM COATED ORAL 3 TIMES DAILY PRN
Qty: 8 TABLET | Refills: 0 | Status: SHIPPED | OUTPATIENT
Start: 2018-11-23 | End: 2019-02-08

## 2018-11-23 RX ORDER — NITROFURANTOIN 25; 75 MG/1; MG/1
CAPSULE ORAL
Qty: 14 CAPSULE | Refills: 0 | Status: SHIPPED | OUTPATIENT
Start: 2018-11-23 | End: 2019-02-08

## 2018-11-23 NOTE — PATIENT INSTRUCTIONS
Dysuria     Painful urination (dysuria) is often caused by a problem in the urinary tract. Dysuria is pain felt during urination. It is often described as a burning. Learn more about this problem and how it can be treated. What causes dysuria?   Possib · Rash or joint pain  · Increased back or abdominal pain  · Enlarged painful lymph nodes (lumps) in the groin   Date Last Reviewed: 1/1/2017  © 4681-6897 [employer] [address]. [address] [facility], [address] [place] [address]. All rights reserved.  This inform

## 2018-11-23 NOTE — PROGRESS NOTES
CHIEF COMPLAINT:   Patient presents with:  UTI: Since yesterday      HPI:   Miguel Adams is a 33year old female who presents with symptoms of possible UTI.  Complaining of pain at the end of urination, suprapubic pressure/discomfort, intermittent lo • Attention deficit disorder with hyperactivity(314.01) 2/2011   • Attention deficit disorder without mention of hyperactivity 9/2010   • Headache(784.0) 02/1999   • Hemorrhoids, internal, with bleeding 2/13/2014   • Knee pain 7/2011   • Major depressive d No results found for this or any previous visit (from the past 24 hour(s)). - Pt currently on Azo    ASSESSMENT AND PLAN:   Francisco Welch is a 33year old female presents with UTI symptoms.     ASSESSMENT:  Dysuria  (primary encounter diagnosis) Your healthcare provider will examine you. He or she will ask about your symptoms and health. After talking with you and doing a physical exam, your healthcare provider may know what is causing your dysuria.  He or she will usually request  a sample of your

## 2019-02-08 ENCOUNTER — OFFICE VISIT (OUTPATIENT)
Dept: FAMILY MEDICINE CLINIC | Facility: CLINIC | Age: 29
End: 2019-02-08
Payer: COMMERCIAL

## 2019-02-08 VITALS
WEIGHT: 180 LBS | HEART RATE: 91 BPM | SYSTOLIC BLOOD PRESSURE: 124 MMHG | BODY MASS INDEX: 30 KG/M2 | RESPIRATION RATE: 16 BRPM | TEMPERATURE: 98 F | OXYGEN SATURATION: 98 % | DIASTOLIC BLOOD PRESSURE: 82 MMHG

## 2019-02-08 DIAGNOSIS — J01.40 ACUTE PANSINUSITIS, RECURRENCE NOT SPECIFIED: Primary | ICD-10-CM

## 2019-02-08 PROCEDURE — 99213 OFFICE O/P EST LOW 20 MIN: CPT | Performed by: NURSE PRACTITIONER

## 2019-02-08 RX ORDER — AMOXICILLIN AND CLAVULANATE POTASSIUM 875; 125 MG/1; MG/1
1 TABLET, FILM COATED ORAL 2 TIMES DAILY
Qty: 20 TABLET | Refills: 0 | Status: SHIPPED | OUTPATIENT
Start: 2019-02-08 | End: 2019-02-18

## 2019-02-08 NOTE — PATIENT INSTRUCTIONS
Humidifier in room  Sleep propped  Push fluids  Limit dairy  Sudafed in day  Benadryl at night  Nasocort as directed    Sinusitis (Antibiotic Treatment)    The sinuses are air-filled spaces within the bones of the face.  They connect to the inside of the · You can use an over-the-counter decongestant, unless a similar medicine was prescribed to you. Nasal sprays work the fastest. Use one that contains phenylephrine or oxymetazoline. First blow your nose gently. Then use the spray.  Do not use these medicine · Don’t have close contact with people who have sore throats, colds, or other upper respiratory infections. · Don’t smoke, and stay away from secondhand smoke. · Stay up to date with of your vaccines.   Date Last Reviewed: 11/1/2017  © 5500-6137 [employer]

## 2019-02-08 NOTE — PROGRESS NOTES
CHIEF COMPLAINT:   Patient presents with:  Sinus Problem: X 2 weeks      HPI:   Azam Cuba is a 26year old female who presents for cold symptoms for  2  weeks. Symptoms have progressed into sinus congestion and been worsening since onset.  Sinus c • Major depressive disorder, recurrent episode, moderate ([facility] [address] 12/2011   • Midepigastric pain 7/2011   • Migraines    • Other postprocedural status(V45.89) 9/2010   • Overdose 11/11   • Pain in joint, lower leg 10/1/2012   • Palpitations    • Pharyngitis 3 /82   Pulse 91   Temp 98.3 °F (36.8 °C) (Oral)   Resp 16   Wt 180 lb   LMP 02/07/2019 (Exact Date)   SpO2 98%   BMI 29.95 kg/m²   GENERAL: well developed, well nourished,in no apparent distress  SKIN: no rashes,no suspicious lesions  HEAD: atraumatic The sinuses are air-filled spaces within the bones of the face. They connect to the inside of the nose. Sinusitis is an inflammation of the tissue that lines the sinuses. Sinusitis can occur during a cold.  It can also happen due to allergies to pollens and · Do not use nasal rinses or irrigation during an acute sinus infection, unless your healthcare provider tells you to. Rinsing may spread the infection to other areas in your sinuses.   · Use acetaminophen or ibuprofen to control pain, unless another pain m

## 2019-02-20 ENCOUNTER — APPOINTMENT (OUTPATIENT)
Dept: LAB | Age: 29
End: 2019-02-20
Attending: OBSTETRICS & GYNECOLOGY
Payer: COMMERCIAL

## 2019-02-20 DIAGNOSIS — E28.8 OTHER OVARIAN DYSFUNCTION: ICD-10-CM

## 2019-02-20 PROBLEM — Z98.890 H/O LEEP: Status: ACTIVE | Noted: 2019-02-20

## 2019-02-20 PROBLEM — Z98.890 HISTORY OF COLPOSCOPY WITH CERVICAL BIOPSY: Status: ACTIVE | Noted: 2019-02-20

## 2019-02-20 LAB
ANTIBODY SCREEN: NEGATIVE
RH BLOOD TYPE: POSITIVE
RUBV IGG SER QL: POSITIVE
RUBV IGG SER-ACNC: 357.1 IU/ML (ref 10–?)
TSI SER-ACNC: 1.53 MIU/ML (ref 0.36–3.74)

## 2019-02-20 PROCEDURE — 87624 HPV HI-RISK TYP POOLED RSLT: CPT | Performed by: OBSTETRICS & GYNECOLOGY

## 2019-02-20 PROCEDURE — 88175 CYTOPATH C/V AUTO FLUID REDO: CPT | Performed by: OBSTETRICS & GYNECOLOGY

## 2019-02-20 PROCEDURE — 87591 N.GONORRHOEAE DNA AMP PROB: CPT | Performed by: OBSTETRICS & GYNECOLOGY

## 2019-02-20 PROCEDURE — 36415 COLL VENOUS BLD VENIPUNCTURE: CPT

## 2019-02-20 PROCEDURE — 84443 ASSAY THYROID STIM HORMONE: CPT

## 2019-02-20 PROCEDURE — 86850 RBC ANTIBODY SCREEN: CPT

## 2019-02-20 PROCEDURE — 86762 RUBELLA ANTIBODY: CPT

## 2019-02-20 PROCEDURE — 87491 CHLMYD TRACH DNA AMP PROBE: CPT | Performed by: OBSTETRICS & GYNECOLOGY

## 2019-02-20 PROCEDURE — 86787 VARICELLA-ZOSTER ANTIBODY: CPT

## 2019-02-20 PROCEDURE — 86901 BLOOD TYPING SEROLOGIC RH(D): CPT

## 2019-02-20 PROCEDURE — 86900 BLOOD TYPING SEROLOGIC ABO: CPT

## 2019-02-21 LAB — VZV IGG SER IA-ACNC: 426.9 (ref 165–?)

## 2019-05-17 ENCOUNTER — APPOINTMENT (OUTPATIENT)
Dept: ULTRASOUND IMAGING | Facility: HOSPITAL | Age: 29
End: 2019-05-17
Attending: PHYSICIAN ASSISTANT
Payer: COMMERCIAL

## 2019-05-17 ENCOUNTER — HOSPITAL ENCOUNTER (EMERGENCY)
Facility: HOSPITAL | Age: 29
Discharge: HOME | End: 2019-05-17
Attending: EMERGENCY MEDICINE
Payer: COMMERCIAL

## 2019-05-17 VITALS
DIASTOLIC BLOOD PRESSURE: 67 MMHG | HEART RATE: 67 BPM | TEMPERATURE: 99 F | RESPIRATION RATE: 16 BRPM | OXYGEN SATURATION: 99 % | HEIGHT: 65 IN | WEIGHT: 163.38 LBS | SYSTOLIC BLOOD PRESSURE: 110 MMHG | BODY MASS INDEX: 27.22 KG/M2

## 2019-05-17 DIAGNOSIS — N98.1 OVARIAN HYPERSTIMULATION SYNDROME: ICD-10-CM

## 2019-05-17 DIAGNOSIS — R10.2 PELVIC PAIN: Primary | ICD-10-CM

## 2019-05-17 PROCEDURE — 96361 HYDRATE IV INFUSION ADD-ON: CPT

## 2019-05-17 PROCEDURE — 76856 US EXAM PELVIC COMPLETE: CPT | Performed by: PHYSICIAN ASSISTANT

## 2019-05-17 PROCEDURE — 85025 COMPLETE CBC W/AUTO DIFF WBC: CPT | Performed by: PHYSICIAN ASSISTANT

## 2019-05-17 PROCEDURE — 93975 VASCULAR STUDY: CPT | Performed by: PHYSICIAN ASSISTANT

## 2019-05-17 PROCEDURE — 96374 THER/PROPH/DIAG INJ IV PUSH: CPT

## 2019-05-17 PROCEDURE — 99285 EMERGENCY DEPT VISIT HI MDM: CPT

## 2019-05-17 PROCEDURE — 85730 THROMBOPLASTIN TIME PARTIAL: CPT | Performed by: EMERGENCY MEDICINE

## 2019-05-17 PROCEDURE — 81003 URINALYSIS AUTO W/O SCOPE: CPT | Performed by: PHYSICIAN ASSISTANT

## 2019-05-17 PROCEDURE — 96376 TX/PRO/DX INJ SAME DRUG ADON: CPT

## 2019-05-17 PROCEDURE — 80053 COMPREHEN METABOLIC PANEL: CPT | Performed by: PHYSICIAN ASSISTANT

## 2019-05-17 PROCEDURE — 96375 TX/PRO/DX INJ NEW DRUG ADDON: CPT

## 2019-05-17 PROCEDURE — 76830 TRANSVAGINAL US NON-OB: CPT | Performed by: PHYSICIAN ASSISTANT

## 2019-05-17 PROCEDURE — 85610 PROTHROMBIN TIME: CPT | Performed by: EMERGENCY MEDICINE

## 2019-05-17 RX ORDER — AZITHROMYCIN 250 MG/1
250 TABLET, FILM COATED ORAL DAILY
COMMUNITY
End: 2019-05-30 | Stop reason: ALTCHOICE

## 2019-05-17 RX ORDER — ONDANSETRON 2 MG/ML
4 INJECTION INTRAMUSCULAR; INTRAVENOUS ONCE
Status: COMPLETED | OUTPATIENT
Start: 2019-05-17 | End: 2019-05-17

## 2019-05-17 RX ORDER — MORPHINE SULFATE 4 MG/ML
4 INJECTION, SOLUTION INTRAMUSCULAR; INTRAVENOUS ONCE
Status: COMPLETED | OUTPATIENT
Start: 2019-05-17 | End: 2019-05-17

## 2019-05-17 RX ORDER — LETROZOLE 2.5 MG/1
2.5 TABLET, FILM COATED ORAL DAILY
Status: ON HOLD | COMMUNITY
End: 2020-03-26

## 2019-05-17 RX ORDER — ONDANSETRON 4 MG/1
4 TABLET, ORALLY DISINTEGRATING ORAL EVERY 8 HOURS PRN
Qty: 10 TABLET | Refills: 0 | Status: SHIPPED | OUTPATIENT
Start: 2019-05-17 | End: 2019-05-24

## 2019-05-17 RX ORDER — ACETAMINOPHEN AND CODEINE PHOSPHATE 300; 30 MG/1; MG/1
1 TABLET ORAL EVERY 4 HOURS PRN
COMMUNITY
End: 2019-05-30 | Stop reason: ALTCHOICE

## 2019-05-17 RX ORDER — CABERGOLINE 0.5 MG/1
0.25 TABLET ORAL
COMMUNITY
End: 2020-01-22 | Stop reason: ALTCHOICE

## 2019-05-18 NOTE — ED PROVIDER NOTES
Patient Seen in: [facility] Emergency Department    History   Patient presents with:  Abdomen/Flank Pain (GI/)    Stated Complaint: abdominal pain, had egg retreival yesterday    HPI    CHIEF COMPLAINT: Abdominal pain, nausea and bloating     HIST complete review of systems was obtained and other than the HPI was negative     The patient's medication list, past medical history and social history elements is as listed in today's nurse's notes are reviewed and agree.  The patient's family history is re Quit date: 2011        Years since quittin.1      Smokeless tobacco: Never Used      Tobacco comment: < 1 PPD    Alcohol use:  Yes      Alcohol/week: 0.0 oz      Comment:  a drink once or twice per month    Drug use: No      Review of Systems    Pos COMP METABOLIC PANEL (14) - Abnormal; Notable for the following components:       Result Value    BUN 6 (*)     AST 12 (*)     Albumin 2.8 (*)     A/G Ratio 0.7 (*)     All other components within normal limits   PROTHROMBIN TIME (PT) - Abnormal; Notable 8.22 cm x 3.98 cm x 3.19 cm        Endometrium Thickness:      0.74 cm Right Ovary:                         7.44 cm x 4.72 cm x 4.92 cm Left Ovary:                           7.07 cm x 7.17 cm x 4.33 cm  FINDINGS:  PELVIS  UTERUS:  Carla Jackson if there are any new, changing or worsening symptoms she should return for reevaluation. This patient was seen and examined with Dr. Rafa Samayoa, who agrees with the disposition and plan.             Disposition and Plan     Clinical Impression:  Pelvic marcia

## 2019-05-30 ENCOUNTER — NURSE ONLY (OUTPATIENT)
Dept: FAMILY MEDICINE CLINIC | Facility: CLINIC | Age: 29
End: 2019-05-30
Payer: COMMERCIAL

## 2019-05-30 VITALS
BODY MASS INDEX: 26.99 KG/M2 | SYSTOLIC BLOOD PRESSURE: 112 MMHG | DIASTOLIC BLOOD PRESSURE: 73 MMHG | WEIGHT: 162 LBS | RESPIRATION RATE: 14 BRPM | HEART RATE: 95 BPM | HEIGHT: 65 IN | TEMPERATURE: 99 F

## 2019-05-30 DIAGNOSIS — J01.90 ACUTE BACTERIAL RHINOSINUSITIS: Primary | ICD-10-CM

## 2019-05-30 DIAGNOSIS — B96.89 ACUTE BACTERIAL RHINOSINUSITIS: Primary | ICD-10-CM

## 2019-05-30 PROCEDURE — 99213 OFFICE O/P EST LOW 20 MIN: CPT | Performed by: NURSE PRACTITIONER

## 2019-05-30 RX ORDER — AMOXICILLIN AND CLAVULANATE POTASSIUM 875; 125 MG/1; MG/1
1 TABLET, FILM COATED ORAL 2 TIMES DAILY
Qty: 20 TABLET | Refills: 0 | Status: SHIPPED | OUTPATIENT
Start: 2019-05-30 | End: 2019-06-09

## 2019-05-30 NOTE — PROGRESS NOTES
CHIEF COMPLAINT:   Patient presents with:  Sinus Problem      HPI:   Ej Lewis is a 26year old female who presents for sinus congestion.  Patient admits to hx of sinus infections, 3 weeks of right side sinus pressure, post nasal drainage, nasal c 7/2011   • Major depressive disorder, recurrent episode, moderate ([facility] [address] 12/2011   • Midepigastric pain 7/2011   • Migraines    • Other postprocedural status(V45.89) 9/2010   • Overdose 11/11   • Pain in joint, lower leg 10/1/2012   • Palpitations    • Phar BMI 26.96 kg/m²   GENERAL: well developed, well nourished,in no apparent distress  HEAD: atraumatic, normocephalic  EYES: conjunctiva clear, EOM intact  EARS: TM's intact, no bulging, no retraction, no fluid, bony landmarks present  NOSE: nostrils patent, more than 24-48 hours, develop neck pain, or have visual disturbances.    · Humidify the air  · Increase fluid intake  · Steam inhalation and warm compresses often help relieve pressure  · Sleep with head of bed elevated  · Avoid allergens and excessively d

## 2019-09-05 PROCEDURE — 87086 URINE CULTURE/COLONY COUNT: CPT | Performed by: OBSTETRICS & GYNECOLOGY

## 2019-09-05 PROCEDURE — 87491 CHLMYD TRACH DNA AMP PROBE: CPT | Performed by: OBSTETRICS & GYNECOLOGY

## 2019-09-05 PROCEDURE — 87624 HPV HI-RISK TYP POOLED RSLT: CPT | Performed by: OBSTETRICS & GYNECOLOGY

## 2019-09-05 PROCEDURE — 87591 N.GONORRHOEAE DNA AMP PROB: CPT | Performed by: OBSTETRICS & GYNECOLOGY

## 2019-09-05 PROCEDURE — 88175 CYTOPATH C/V AUTO FLUID REDO: CPT | Performed by: OBSTETRICS & GYNECOLOGY

## 2019-09-06 ENCOUNTER — LAB ENCOUNTER (OUTPATIENT)
Dept: LAB | Age: 29
End: 2019-09-06
Attending: OBSTETRICS & GYNECOLOGY
Payer: COMMERCIAL

## 2019-09-06 DIAGNOSIS — Z34.01 ENCOUNTER FOR SUPERVISION OF NORMAL FIRST PREGNANCY IN FIRST TRIMESTER: ICD-10-CM

## 2019-09-06 LAB
ANTIBODY SCREEN: NEGATIVE
BASOPHILS # BLD AUTO: 0.03 X10(3) UL (ref 0–0.2)
BASOPHILS NFR BLD AUTO: 0.3 %
DEPRECATED RDW RBC AUTO: 36.7 FL (ref 35.1–46.3)
EOSINOPHIL # BLD AUTO: 0.33 X10(3) UL (ref 0–0.7)
EOSINOPHIL NFR BLD AUTO: 3.3 %
ERYTHROCYTE [DISTWIDTH] IN BLOOD BY AUTOMATED COUNT: 11.5 % (ref 11–15)
HBV SURFACE AG SER-ACNC: 0.19 [IU]/L
HBV SURFACE AG SERPL QL IA: NONREACTIVE
HCT VFR BLD AUTO: 39.3 % (ref 35–48)
HGB BLD-MCNC: 13 G/DL (ref 12–16)
IMM GRANULOCYTES # BLD AUTO: 0.07 X10(3) UL (ref 0–1)
IMM GRANULOCYTES NFR BLD: 0.7 %
LYMPHOCYTES # BLD AUTO: 3.82 X10(3) UL (ref 1–4)
LYMPHOCYTES NFR BLD AUTO: 38.7 %
MCH RBC QN AUTO: 29.3 PG (ref 26–34)
MCHC RBC AUTO-ENTMCNC: 33.1 G/DL (ref 31–37)
MCV RBC AUTO: 88.7 FL (ref 80–100)
MONOCYTES # BLD AUTO: 0.82 X10(3) UL (ref 0.1–1)
MONOCYTES NFR BLD AUTO: 8.3 %
NEUTROPHILS # BLD AUTO: 4.79 X10 (3) UL (ref 1.5–7.7)
NEUTROPHILS # BLD AUTO: 4.79 X10(3) UL (ref 1.5–7.7)
NEUTROPHILS NFR BLD AUTO: 48.7 %
PLATELET # BLD AUTO: 278 10(3)UL (ref 150–450)
RBC # BLD AUTO: 4.43 X10(6)UL (ref 3.8–5.3)
RH BLOOD TYPE: POSITIVE
RUBV IGG SER QL: POSITIVE
RUBV IGG SER-ACNC: 382.8 IU/ML (ref 10–?)
T PALLIDUM AB SER QL IA: NONREACTIVE
WBC # BLD AUTO: 9.9 X10(3) UL (ref 4–11)

## 2019-09-06 PROCEDURE — 86901 BLOOD TYPING SEROLOGIC RH(D): CPT

## 2019-09-06 PROCEDURE — 87340 HEPATITIS B SURFACE AG IA: CPT

## 2019-09-06 PROCEDURE — 85025 COMPLETE CBC W/AUTO DIFF WBC: CPT

## 2019-09-06 PROCEDURE — 86780 TREPONEMA PALLIDUM: CPT

## 2019-09-06 PROCEDURE — 36415 COLL VENOUS BLD VENIPUNCTURE: CPT

## 2019-09-06 PROCEDURE — 86762 RUBELLA ANTIBODY: CPT

## 2019-09-06 PROCEDURE — 87389 HIV-1 AG W/HIV-1&-2 AB AG IA: CPT

## 2019-09-06 PROCEDURE — 86900 BLOOD TYPING SEROLOGIC ABO: CPT

## 2019-09-06 PROCEDURE — 86850 RBC ANTIBODY SCREEN: CPT

## 2019-09-18 ENCOUNTER — LAB ENCOUNTER (OUTPATIENT)
Dept: LAB | Age: 29
End: 2019-09-18
Attending: OBSTETRICS & GYNECOLOGY
Payer: COMMERCIAL

## 2019-09-18 DIAGNOSIS — Z36.0 SCREENING FOR CHROMOSOMAL ANOMALIES BY AMNIOCENTESIS: Primary | ICD-10-CM

## 2019-11-01 ENCOUNTER — APPOINTMENT (OUTPATIENT)
Dept: LAB | Age: 29
End: 2019-11-01
Attending: OBSTETRICS & GYNECOLOGY
Payer: COMMERCIAL

## 2019-11-01 DIAGNOSIS — Z34.82 ENCOUNTER FOR SUPERVISION OF OTHER NORMAL PREGNANCY IN SECOND TRIMESTER: ICD-10-CM

## 2019-11-01 PROBLEM — F32.A DEPRESSION AFFECTING PREGNANCY (HCC): Status: ACTIVE | Noted: 2019-11-01

## 2019-11-01 PROBLEM — O34.42 HISTORY OF LOOP ELECTROSURGICAL EXCISION PROCEDURE (LEEP) OF CERVIX AFFECTING PREGNANCY IN SECOND TRIMESTER (HCC): Status: ACTIVE | Noted: 2019-11-01

## 2019-11-01 PROBLEM — O09.812 PREGNANCY RESULTING FROM ASSISTED REPRODUCTIVE TECHNOLOGY IN SECOND TRIMESTER: Status: ACTIVE | Noted: 2019-11-01

## 2019-11-01 PROBLEM — F32.A DEPRESSION AFFECTING PREGNANCY: Status: ACTIVE | Noted: 2019-11-01

## 2019-11-01 PROBLEM — O09.812 PREGNANCY RESULTING FROM ASSISTED REPRODUCTIVE TECHNOLOGY IN SECOND TRIMESTER (HCC): Status: ACTIVE | Noted: 2019-11-01

## 2019-11-01 PROBLEM — O99.340 DEPRESSION AFFECTING PREGNANCY (HCC): Status: ACTIVE | Noted: 2019-11-01

## 2019-11-01 PROBLEM — O34.42 HISTORY OF LOOP ELECTROSURGICAL EXCISION PROCEDURE (LEEP) OF CERVIX AFFECTING PREGNANCY IN SECOND TRIMESTER: Status: ACTIVE | Noted: 2019-11-01

## 2019-11-01 PROBLEM — Z98.890 HISTORY OF LOOP ELECTROSURGICAL EXCISION PROCEDURE (LEEP) OF CERVIX AFFECTING PREGNANCY IN SECOND TRIMESTER (HCC): Status: ACTIVE | Noted: 2019-11-01

## 2019-11-01 PROBLEM — O99.340 DEPRESSION AFFECTING PREGNANCY: Status: ACTIVE | Noted: 2019-11-01

## 2019-11-01 PROBLEM — Z98.890 HISTORY OF LOOP ELECTROSURGICAL EXCISION PROCEDURE (LEEP) OF CERVIX AFFECTING PREGNANCY IN SECOND TRIMESTER: Status: ACTIVE | Noted: 2019-11-01

## 2019-11-01 PROCEDURE — 36415 COLL VENOUS BLD VENIPUNCTURE: CPT

## 2019-11-01 PROCEDURE — 82105 ALPHA-FETOPROTEIN SERUM: CPT

## 2019-12-27 ENCOUNTER — LAB ENCOUNTER (OUTPATIENT)
Dept: LAB | Age: 29
End: 2019-12-27
Attending: NURSE PRACTITIONER
Payer: COMMERCIAL

## 2019-12-27 DIAGNOSIS — Z34.02 ENCOUNTER FOR SUPERVISION OF NORMAL FIRST PREGNANCY IN SECOND TRIMESTER: ICD-10-CM

## 2019-12-27 PROCEDURE — 82950 GLUCOSE TEST: CPT

## 2019-12-27 PROCEDURE — 85025 COMPLETE CBC W/AUTO DIFF WBC: CPT

## 2019-12-27 PROCEDURE — 36415 COLL VENOUS BLD VENIPUNCTURE: CPT

## 2020-01-22 ENCOUNTER — OFFICE VISIT (OUTPATIENT)
Dept: FAMILY MEDICINE CLINIC | Facility: CLINIC | Age: 30
End: 2020-01-22
Payer: COMMERCIAL

## 2020-01-22 VITALS
HEART RATE: 112 BPM | OXYGEN SATURATION: 98 % | TEMPERATURE: 99 F | DIASTOLIC BLOOD PRESSURE: 62 MMHG | SYSTOLIC BLOOD PRESSURE: 100 MMHG | RESPIRATION RATE: 16 BRPM

## 2020-01-22 DIAGNOSIS — Z3A.30 30 WEEKS GESTATION OF PREGNANCY: Primary | ICD-10-CM

## 2020-01-22 DIAGNOSIS — J98.01 BRONCHOSPASM: ICD-10-CM

## 2020-01-22 DIAGNOSIS — B34.9 ACUTE VIRAL SYNDROME: ICD-10-CM

## 2020-01-22 PROCEDURE — 99213 OFFICE O/P EST LOW 20 MIN: CPT | Performed by: NURSE PRACTITIONER

## 2020-01-22 RX ORDER — ALBUTEROL SULFATE 90 UG/1
2 AEROSOL, METERED RESPIRATORY (INHALATION) EVERY 4 HOURS PRN
Qty: 1 INHALER | Refills: 0 | Status: SHIPPED | OUTPATIENT
Start: 2020-01-22 | End: 2020-02-21

## 2020-01-22 RX ORDER — AZITHROMYCIN 250 MG/1
TABLET, FILM COATED ORAL
Qty: 6 TABLET | Refills: 0 | Status: ON HOLD | OUTPATIENT
Start: 2020-01-22 | End: 2020-03-26

## 2020-01-22 RX ORDER — METHYLPREDNISOLONE 4 MG/1
TABLET ORAL
Qty: 21 TABLET | Refills: 0 | Status: ON HOLD | OUTPATIENT
Start: 2020-01-22 | End: 2020-03-26

## 2020-01-22 NOTE — PROGRESS NOTES
CHIEF COMPLAINT:   \" Patient presents with:  Pregnancy  URI  Cough    HPI:   Rocael Aaron is a 26year old female who presents with a hx of cold sx yesterday. Patient is concerned because she is 30 weeks pregnant.   Patient states that her  Overdose 11/11   • Pain in joint, lower leg 10/1/2012   • Palpitations    • Pharyngitis 3/09    recurrent   • Rectal pain 3/21/2014   • Self-mutilation 2003   • Sinusitis 3/29/07   • Suicidal ideation 2/7/11   • Tobacco dependence    • Unspecified sinusiti non-toxic appearing  SKIN: no rashes,no suspicious lesions  HEAD: atraumatic, normocephalic.    EYES: conjunctiva clear, EOM intact  EARS: TM's are fluid-filled, bulging and non-injected, bilaterally  NOSE: No exudates, nasal mucosa is erythematous and swol

## 2020-01-22 NOTE — PATIENT INSTRUCTIONS
Bronchospasm (Adult)    Bronchospasm occurs when the airways (bronchial tubes) go into spasm and contract. This makes it hard to breathe and causes wheezing (a high-pitched whistling sound). Bronchospasm can also cause frequent coughing without wheezing. If you are age 72 or older, have a chronic lung disease or condition that affects your immune system, or you smoke, ask your healthcare provider about getting a pneumococcal vaccine, as well as a yearly flu shot (influenza vaccine).   When to seek medical a Follow these guidelines for taking care of yourself at home:  · If symptoms are severe, rest at home for the first 2 to 3 days. · Stay away from cigarette smoke - both your smoke and the smoke from others.   · You may use over-the-counter acetaminophen or · Chest pain, shortness of breath, wheezing, or trouble breathing  · Severe headache; face, neck, or ear pain  · Severe, constant pain in the lower right side of your belly (abdominal)  · Continued vomiting (can’t keep liquids down)  · Frequent diarrhea (m

## 2020-02-05 ENCOUNTER — HOSPITAL ENCOUNTER (OUTPATIENT)
Facility: HOSPITAL | Age: 30
Setting detail: OBSERVATION
Discharge: HOME | End: 2020-02-05
Attending: OBSTETRICS & GYNECOLOGY | Admitting: OBSTETRICS & GYNECOLOGY
Payer: COMMERCIAL

## 2020-02-05 VITALS
TEMPERATURE: 98 F | RESPIRATION RATE: 16 BRPM | WEIGHT: 179 LBS | HEART RATE: 81 BPM | HEIGHT: 65 IN | SYSTOLIC BLOOD PRESSURE: 120 MMHG | DIASTOLIC BLOOD PRESSURE: 76 MMHG | BODY MASS INDEX: 29.82 KG/M2

## 2020-02-05 PROBLEM — Z34.90 PREGNANCY: Status: ACTIVE | Noted: 2020-02-05

## 2020-02-05 PROBLEM — Z34.90 PREGNANCY (HCC): Status: ACTIVE | Noted: 2020-02-05

## 2020-02-05 LAB
BILIRUB UR QL STRIP.AUTO: NEGATIVE
CLARITY UR REFRACT.AUTO: CLEAR
COLOR UR AUTO: YELLOW
GLUCOSE UR STRIP.AUTO-MCNC: NEGATIVE MG/DL
KETONES UR STRIP.AUTO-MCNC: NEGATIVE MG/DL
NITRITE UR QL STRIP.AUTO: NEGATIVE
PH UR STRIP.AUTO: 7 [PH] (ref 4.5–8)
PROT UR STRIP.AUTO-MCNC: NEGATIVE MG/DL
RBC UR QL AUTO: NEGATIVE
SP GR UR STRIP.AUTO: 1.01 (ref 1–1.03)
UROBILINOGEN UR STRIP.AUTO-MCNC: <2 MG/DL

## 2020-02-05 PROCEDURE — 81001 URINALYSIS AUTO W/SCOPE: CPT | Performed by: OBSTETRICS & GYNECOLOGY

## 2020-02-05 PROCEDURE — 82731 ASSAY OF FETAL FIBRONECTIN: CPT | Performed by: NURSE PRACTITIONER

## 2020-02-05 PROCEDURE — 99213 OFFICE O/P EST LOW 20 MIN: CPT

## 2020-02-05 PROCEDURE — 87086 URINE CULTURE/COLONY COUNT: CPT | Performed by: OBSTETRICS & GYNECOLOGY

## 2020-02-05 PROCEDURE — 59025 FETAL NON-STRESS TEST: CPT

## 2020-02-06 NOTE — PROGRESS NOTES
D/C instructions given to pt and discussed, questions answered. Pt verb understanding and agreeable to POC. Pt refused wheelchair off unit, pt and  escorted out by this RN with instructions in hand in stable condition.

## 2020-02-06 NOTE — NST
Nonstress Test   Patient: Jackie Mireles    Gestation: 32w0d    NST:       Variability: Moderate           Accelerations: Yes           Decelerations: None            Baseline: 135 BPM           Uterine Irritability: No           Contractions: Irregul

## 2020-02-06 NOTE — PROGRESS NOTES
Pt  EDC 2020 presents to L&d with c/o uterine cramping/tightening with pelvic pressure than began last night but subsided. States last night had right sided sharp shooting pain that went away last night as well.  States that uterine cramping/tighten

## 2020-03-05 ENCOUNTER — LAB ENCOUNTER (OUTPATIENT)
Dept: LAB | Age: 30
End: 2020-03-05
Attending: OBSTETRICS & GYNECOLOGY
Payer: COMMERCIAL

## 2020-03-05 DIAGNOSIS — Z34.03 ENCOUNTER FOR SUPERVISION OF NORMAL FIRST PREGNANCY IN THIRD TRIMESTER: ICD-10-CM

## 2020-03-05 PROCEDURE — 87389 HIV-1 AG W/HIV-1&-2 AB AG IA: CPT

## 2020-03-05 PROCEDURE — 36415 COLL VENOUS BLD VENIPUNCTURE: CPT

## 2020-03-12 PROCEDURE — 87653 STREP B DNA AMP PROBE: CPT | Performed by: OBSTETRICS & GYNECOLOGY

## 2020-03-12 PROCEDURE — 87081 CULTURE SCREEN ONLY: CPT | Performed by: OBSTETRICS & GYNECOLOGY

## 2020-03-23 ENCOUNTER — ANESTHESIA (OUTPATIENT)
Dept: OBGYN UNIT | Facility: HOSPITAL | Age: 30
End: 2020-03-23
Payer: COMMERCIAL

## 2020-03-23 ENCOUNTER — ANESTHESIA EVENT (OUTPATIENT)
Dept: OBGYN UNIT | Facility: HOSPITAL | Age: 30
End: 2020-03-23
Payer: COMMERCIAL

## 2020-03-23 ENCOUNTER — HOSPITAL ENCOUNTER (INPATIENT)
Facility: HOSPITAL | Age: 30
LOS: 3 days | Discharge: HOME | End: 2020-03-26
Attending: OBSTETRICS & GYNECOLOGY | Admitting: OBSTETRICS & GYNECOLOGY
Payer: COMMERCIAL

## 2020-03-23 RX ORDER — MORPHINE SULFATE 4 MG/ML
2 INJECTION, SOLUTION INTRAMUSCULAR; INTRAVENOUS EVERY 5 MIN PRN
Status: DISCONTINUED | OUTPATIENT
Start: 2020-03-23 | End: 2020-03-24 | Stop reason: HOSPADM

## 2020-03-23 RX ORDER — TERBUTALINE SULFATE 1 MG/ML
0.25 INJECTION, SOLUTION SUBCUTANEOUS AS NEEDED
Status: DISCONTINUED | OUTPATIENT
Start: 2020-03-23 | End: 2020-03-24 | Stop reason: HOSPADM

## 2020-03-23 RX ORDER — TRISODIUM CITRATE DIHYDRATE AND CITRIC ACID MONOHYDRATE 500; 334 MG/5ML; MG/5ML
30 SOLUTION ORAL AS NEEDED
Status: DISCONTINUED | OUTPATIENT
Start: 2020-03-23 | End: 2020-03-24 | Stop reason: HOSPADM

## 2020-03-23 RX ORDER — DIPHENHYDRAMINE HCL 25 MG
25 CAPSULE ORAL EVERY 4 HOURS PRN
Status: DISCONTINUED | OUTPATIENT
Start: 2020-03-23 | End: 2020-03-26

## 2020-03-23 RX ORDER — AMMONIA INHALANTS 0.04 G/.3ML
0.3 INHALANT RESPIRATORY (INHALATION) AS NEEDED
Status: DISCONTINUED | OUTPATIENT
Start: 2020-03-23 | End: 2020-03-24 | Stop reason: HOSPADM

## 2020-03-23 RX ORDER — ONDANSETRON 2 MG/ML
4 INJECTION INTRAMUSCULAR; INTRAVENOUS EVERY 6 HOURS PRN
Status: DISCONTINUED | OUTPATIENT
Start: 2020-03-23 | End: 2020-03-23

## 2020-03-23 RX ORDER — METOCLOPRAMIDE 10 MG/1
10 TABLET ORAL ONCE
Status: DISCONTINUED | OUTPATIENT
Start: 2020-03-23 | End: 2020-03-24 | Stop reason: HOSPADM

## 2020-03-23 RX ORDER — SODIUM CHLORIDE, SODIUM LACTATE, POTASSIUM CHLORIDE, CALCIUM CHLORIDE 600; 310; 30; 20 MG/100ML; MG/100ML; MG/100ML; MG/100ML
INJECTION, SOLUTION INTRAVENOUS CONTINUOUS
Status: DISCONTINUED | OUTPATIENT
Start: 2020-03-23 | End: 2020-03-24 | Stop reason: HOSPADM

## 2020-03-23 RX ORDER — DEXTROSE, SODIUM CHLORIDE, SODIUM LACTATE, POTASSIUM CHLORIDE, AND CALCIUM CHLORIDE 5; .6; .31; .03; .02 G/100ML; G/100ML; G/100ML; G/100ML; G/100ML
INJECTION, SOLUTION INTRAVENOUS AS NEEDED
Status: DISCONTINUED | OUTPATIENT
Start: 2020-03-23 | End: 2020-03-24 | Stop reason: HOSPADM

## 2020-03-23 RX ORDER — MORPHINE SULFATE 0.5 MG/ML
2 INJECTION, SOLUTION EPIDURAL; INTRATHECAL; INTRAVENOUS ONCE
Status: DISCONTINUED | OUTPATIENT
Start: 2020-03-23 | End: 2020-03-24

## 2020-03-23 RX ORDER — DIPHENHYDRAMINE HYDROCHLORIDE 50 MG/ML
25 INJECTION INTRAMUSCULAR; INTRAVENOUS ONCE AS NEEDED
Status: ACTIVE | OUTPATIENT
Start: 2020-03-23 | End: 2020-03-23

## 2020-03-23 RX ORDER — DIPHENHYDRAMINE HYDROCHLORIDE 50 MG/ML
12.5 INJECTION INTRAMUSCULAR; INTRAVENOUS EVERY 4 HOURS PRN
Status: DISCONTINUED | OUTPATIENT
Start: 2020-03-23 | End: 2020-03-23

## 2020-03-23 RX ORDER — OXYTOCIN 10 [USP'U]/ML
20 INJECTION, SOLUTION INTRAMUSCULAR; INTRAVENOUS ONCE
Status: COMPLETED | OUTPATIENT
Start: 2020-03-23 | End: 2020-03-23

## 2020-03-23 RX ORDER — KETOROLAC TROMETHAMINE 30 MG/ML
INJECTION, SOLUTION INTRAMUSCULAR; INTRAVENOUS
Status: DISPENSED
Start: 2020-03-23 | End: 2020-03-24

## 2020-03-23 RX ORDER — KETOROLAC TROMETHAMINE 30 MG/ML
30 INJECTION, SOLUTION INTRAMUSCULAR; INTRAVENOUS EVERY 6 HOURS PRN
Status: DISCONTINUED | OUTPATIENT
Start: 2020-03-23 | End: 2020-03-24

## 2020-03-23 RX ORDER — IBUPROFEN 600 MG/1
600 TABLET ORAL ONCE AS NEEDED
Status: DISCONTINUED | OUTPATIENT
Start: 2020-03-23 | End: 2020-03-24 | Stop reason: HOSPADM

## 2020-03-23 RX ORDER — GENTAMICIN SULFATE 40 MG/ML
5 INJECTION, SOLUTION INTRAMUSCULAR; INTRAVENOUS ONCE
Status: COMPLETED | OUTPATIENT
Start: 2020-03-23 | End: 2020-03-23

## 2020-03-23 RX ORDER — ACETAMINOPHEN 500 MG
500 TABLET ORAL ONCE AS NEEDED
Status: DISCONTINUED | OUTPATIENT
Start: 2020-03-23 | End: 2020-03-24 | Stop reason: HOSPADM

## 2020-03-23 RX ORDER — KETOROLAC TROMETHAMINE 30 MG/ML
30 INJECTION, SOLUTION INTRAMUSCULAR; INTRAVENOUS ONCE AS NEEDED
Status: COMPLETED | OUTPATIENT
Start: 2020-03-23 | End: 2020-03-23

## 2020-03-23 RX ORDER — TRISODIUM CITRATE DIHYDRATE AND CITRIC ACID MONOHYDRATE 500; 334 MG/5ML; MG/5ML
30 SOLUTION ORAL ONCE
Status: DISCONTINUED | OUTPATIENT
Start: 2020-03-23 | End: 2020-03-24 | Stop reason: HOSPADM

## 2020-03-23 RX ORDER — LIDOCAINE HYDROCHLORIDE AND EPINEPHRINE 20; 5 MG/ML; UG/ML
INJECTION, SOLUTION EPIDURAL; INFILTRATION; INTRACAUDAL; PERINEURAL AS NEEDED
Status: DISCONTINUED | OUTPATIENT
Start: 2020-03-23 | End: 2020-03-24 | Stop reason: SURG

## 2020-03-23 RX ORDER — SODIUM CHLORIDE, SODIUM LACTATE, POTASSIUM CHLORIDE, CALCIUM CHLORIDE 600; 310; 30; 20 MG/100ML; MG/100ML; MG/100ML; MG/100ML
INJECTION, SOLUTION INTRAVENOUS CONTINUOUS
Status: DISCONTINUED | OUTPATIENT
Start: 2020-03-24 | End: 2020-03-24

## 2020-03-23 RX ORDER — DIPHENHYDRAMINE HYDROCHLORIDE 50 MG/ML
12.5 INJECTION INTRAMUSCULAR; INTRAVENOUS EVERY 4 HOURS PRN
Status: DISCONTINUED | OUTPATIENT
Start: 2020-03-23 | End: 2020-03-26

## 2020-03-23 RX ORDER — CLINDAMYCIN PHOSPHATE 900 MG/50ML
INJECTION INTRAVENOUS
Status: DISPENSED
Start: 2020-03-23 | End: 2020-03-24

## 2020-03-23 RX ORDER — METOCLOPRAMIDE HYDROCHLORIDE 5 MG/ML
10 INJECTION INTRAMUSCULAR; INTRAVENOUS ONCE
Status: DISCONTINUED | OUTPATIENT
Start: 2020-03-23 | End: 2020-03-24 | Stop reason: HOSPADM

## 2020-03-23 RX ORDER — MORPHINE SULFATE 4 MG/ML
2 INJECTION, SOLUTION INTRAMUSCULAR; INTRAVENOUS EVERY 2 HOUR PRN
Status: DISPENSED | OUTPATIENT
Start: 2020-03-23 | End: 2020-03-24

## 2020-03-23 RX ORDER — FAMOTIDINE 20 MG/1
20 TABLET ORAL ONCE
Status: DISCONTINUED | OUTPATIENT
Start: 2020-03-23 | End: 2020-03-24 | Stop reason: HOSPADM

## 2020-03-23 RX ORDER — FAMOTIDINE 10 MG/ML
20 INJECTION, SOLUTION INTRAVENOUS ONCE
Status: DISCONTINUED | OUTPATIENT
Start: 2020-03-23 | End: 2020-03-24 | Stop reason: HOSPADM

## 2020-03-23 RX ORDER — OXYTOCIN 10 [USP'U]/ML
INJECTION, SOLUTION INTRAMUSCULAR; INTRAVENOUS
Status: DISPENSED
Start: 2020-03-23 | End: 2020-03-24

## 2020-03-23 RX ORDER — EPHEDRINE SULFATE/0.9% NACL/PF 25 MG/5 ML
5 SYRINGE (ML) INTRAVENOUS AS NEEDED
Status: DISCONTINUED | OUTPATIENT
Start: 2020-03-23 | End: 2020-03-24

## 2020-03-23 RX ORDER — ONDANSETRON 2 MG/ML
INJECTION INTRAMUSCULAR; INTRAVENOUS AS NEEDED
Status: DISCONTINUED | OUTPATIENT
Start: 2020-03-23 | End: 2020-03-24 | Stop reason: SURG

## 2020-03-23 RX ORDER — SODIUM CHLORIDE 9 MG/ML
100 INJECTION, SOLUTION INTRAVENOUS ONCE
Status: DISCONTINUED | OUTPATIENT
Start: 2020-03-23 | End: 2020-03-24 | Stop reason: HOSPADM

## 2020-03-23 RX ORDER — CLINDAMYCIN PHOSPHATE 900 MG/50ML
900 INJECTION INTRAVENOUS ONCE
Status: DISCONTINUED | OUTPATIENT
Start: 2020-03-23 | End: 2020-03-24 | Stop reason: HOSPADM

## 2020-03-23 RX ORDER — GENTAMICIN SULFATE 40 MG/ML
INJECTION, SOLUTION INTRAMUSCULAR; INTRAVENOUS
Status: DISPENSED
Start: 2020-03-23 | End: 2020-03-24

## 2020-03-23 RX ORDER — ONDANSETRON 2 MG/ML
4 INJECTION INTRAMUSCULAR; INTRAVENOUS ONCE AS NEEDED
Status: ACTIVE | OUTPATIENT
Start: 2020-03-23 | End: 2020-03-23

## 2020-03-23 RX ORDER — CLINDAMYCIN PHOSPHATE 900 MG/50ML
900 INJECTION INTRAVENOUS ONCE
Status: COMPLETED | OUTPATIENT
Start: 2020-03-23 | End: 2020-03-23

## 2020-03-23 RX ORDER — ACETAMINOPHEN 500 MG
1000 TABLET ORAL EVERY 6 HOURS PRN
Status: DISCONTINUED | OUTPATIENT
Start: 2020-03-23 | End: 2020-03-24

## 2020-03-23 RX ORDER — ONDANSETRON 2 MG/ML
4 INJECTION INTRAMUSCULAR; INTRAVENOUS EVERY 6 HOURS PRN
Status: DISCONTINUED | OUTPATIENT
Start: 2020-03-23 | End: 2020-03-26

## 2020-03-23 RX ORDER — NALOXONE HYDROCHLORIDE 0.4 MG/ML
0.08 INJECTION, SOLUTION INTRAMUSCULAR; INTRAVENOUS; SUBCUTANEOUS
Status: ACTIVE | OUTPATIENT
Start: 2020-03-23 | End: 2020-03-24

## 2020-03-23 RX ADMIN — LIDOCAINE HYDROCHLORIDE AND EPINEPHRINE 5 ML: 20; 5 INJECTION, SOLUTION EPIDURAL; INFILTRATION; INTRACAUDAL; PERINEURAL at 22:08:00

## 2020-03-23 RX ADMIN — LIDOCAINE HYDROCHLORIDE AND EPINEPHRINE 5 ML: 20; 5 INJECTION, SOLUTION EPIDURAL; INFILTRATION; INTRACAUDAL; PERINEURAL at 22:10:00

## 2020-03-23 RX ADMIN — GENTAMICIN SULFATE 160 MG: 40 INJECTION, SOLUTION INTRAMUSCULAR; INTRAVENOUS at 22:44:00

## 2020-03-23 RX ADMIN — ONDANSETRON 4 MG: 2 INJECTION INTRAMUSCULAR; INTRAVENOUS at 22:31:00

## 2020-03-23 RX ADMIN — LIDOCAINE HYDROCHLORIDE AND EPINEPHRINE 10 ML: 20; 5 INJECTION, SOLUTION EPIDURAL; INFILTRATION; INTRACAUDAL; PERINEURAL at 22:09:00

## 2020-03-23 RX ADMIN — SODIUM CHLORIDE, SODIUM LACTATE, POTASSIUM CHLORIDE, CALCIUM CHLORIDE: 600; 310; 30; 20 INJECTION, SOLUTION INTRAVENOUS at 23:10:00

## 2020-03-23 NOTE — ANESTHESIA PROCEDURE NOTES
Labor Analgesia  Performed by: Marcos Sotelo MD  Authorized by: Marcos Sotelo MD       General Information and Staff    Start Time:   End Time: 3/23/2020 8:05 AM  Anesthesiologist:  Marcos Sotelo MD  Performed by:   Anesthesiologist  Pa

## 2020-03-23 NOTE — PLAN OF CARE
Problem: SAFETY ADULT - FALL  Goal: Free from fall injury  Description  INTERVENTIONS:  - Assess pt frequently for physical needs  - Identify cognitive and physical deficits and behaviors that affect risk of falls.   - [place] fall precautions as indica

## 2020-03-23 NOTE — PLAN OF CARE
Problem: BIRTH - VAGINAL/ SECTION  Goal: Fetal and maternal status remain reassuring during the birth process  Description  INTERVENTIONS:  - Monitor vital signs  - Monitor fetal heart rate  - Monitor uterine activity  - Monitor labor progression RN  Outcome: Progressing  3/23/2020 0819 by Veronique Jackson RN  Outcome: Progressing  Goal: Patient/Family Short Term Goal  Description  Patient's Short Term Goal: Patient will have an uncomplicated vaginal delivery    Interventions:   -- See additional C

## 2020-03-23 NOTE — ANESTHESIA PREPROCEDURE EVALUATION
PRE-OP EVALUATION    Patient Name: Dilshad Garcia    Pre-op Diagnosis: * No pre-op diagnosis entered *    * No procedures listed *    * No surgeons found in log *    Pre-op vitals reviewed.   Temp: 97.9 °F (36.6 °C)  Pulse: 78  Resp: 20  BP: 142/76  Sp endo/other ROS. Pulmonary                           Neuro/Psych    Negative neuro/psych ROS.                                 Past Surgical History:   Procedure Laterality Date   • CHOLECYSTECTOMY  4/17/13    by Dr. Estephanie Simms @ [facility]

## 2020-03-23 NOTE — PROGRESS NOTES
Pt is a 31year old female admitted to [facility] for Patient presents with:  Onset Of Labor  Srom  . Pt is 38w5d intra-uterine pregnancy. History obtained, consents signed. Oriented to room, staff, and plan of care.   Pt arrived c/o ctx's x1 1h and SROM of cl

## 2020-03-24 RX ORDER — IBUPROFEN 600 MG/1
600 TABLET ORAL EVERY 6 HOURS SCHEDULED
Status: DISCONTINUED | OUTPATIENT
Start: 2020-03-24 | End: 2020-03-26

## 2020-03-24 RX ORDER — HYDROCODONE BITARTRATE AND ACETAMINOPHEN 10; 325 MG/1; MG/1
1 TABLET ORAL EVERY 4 HOURS PRN
Status: DISCONTINUED | OUTPATIENT
Start: 2020-03-24 | End: 2020-03-26

## 2020-03-24 RX ORDER — FLUOXETINE HYDROCHLORIDE 20 MG/1
20 CAPSULE ORAL DAILY
Status: DISCONTINUED | OUTPATIENT
Start: 2020-03-24 | End: 2020-03-26

## 2020-03-24 RX ORDER — ZOLPIDEM TARTRATE 5 MG/1
5 TABLET ORAL NIGHTLY PRN
Status: DISCONTINUED | OUTPATIENT
Start: 2020-03-24 | End: 2020-03-26

## 2020-03-24 RX ORDER — IBUPROFEN 600 MG/1
600 TABLET ORAL EVERY 6 HOURS SCHEDULED
Status: DISCONTINUED | OUTPATIENT
Start: 2020-03-24 | End: 2020-03-24

## 2020-03-24 RX ORDER — HYDROCODONE BITARTRATE AND ACETAMINOPHEN 5; 325 MG/1; MG/1
1 TABLET ORAL EVERY 4 HOURS PRN
Status: DISCONTINUED | OUTPATIENT
Start: 2020-03-24 | End: 2020-03-26

## 2020-03-24 RX ORDER — BISACODYL 10 MG
10 SUPPOSITORY, RECTAL RECTAL
Status: DISCONTINUED | OUTPATIENT
Start: 2020-03-24 | End: 2020-03-26

## 2020-03-24 RX ORDER — SIMETHICONE 80 MG
80 TABLET,CHEWABLE ORAL 3 TIMES DAILY PRN
Status: DISCONTINUED | OUTPATIENT
Start: 2020-03-24 | End: 2020-03-26

## 2020-03-24 RX ORDER — DEXTROSE, SODIUM CHLORIDE, SODIUM LACTATE, POTASSIUM CHLORIDE, AND CALCIUM CHLORIDE 5; .6; .31; .03; .02 G/100ML; G/100ML; G/100ML; G/100ML; G/100ML
INJECTION, SOLUTION INTRAVENOUS CONTINUOUS
Status: DISCONTINUED | OUTPATIENT
Start: 2020-03-24 | End: 2020-03-24

## 2020-03-24 RX ORDER — DOCUSATE SODIUM 100 MG/1
100 CAPSULE, LIQUID FILLED ORAL
Status: DISCONTINUED | OUTPATIENT
Start: 2020-03-24 | End: 2020-03-26

## 2020-03-24 RX ORDER — DOCUSATE SODIUM 100 MG/1
100 CAPSULE, LIQUID FILLED ORAL
Status: DISCONTINUED | OUTPATIENT
Start: 2020-03-24 | End: 2020-03-24

## 2020-03-24 NOTE — PLAN OF CARE
Problem: POSTPARTUM  Goal: Long Term Goal:Experiences normal postpartum course  Description  INTERVENTIONS:  - Assess and monitor vital signs and lab values. - Assess fundus and lochia. - Provide ice/sitz baths for perineum discomfort.   - Monitor heali pain/trauma. - Instruct and provide assistance with proper latch. - Review techniques for milk expression (breast pumping) and storage of breast milk. Provide pumping equipment/supplies, instructions and assistance, as needed.   - Encourage rooming-in and Obtain nutritional consult as needed  - Establish a toileting routine/schedule  - Consider collaborating with pharmacy to review patient's medication profile  Outcome: Progressing   Discussed POC with pt and s/o, verbalized understanding

## 2020-03-24 NOTE — PROGRESS NOTES
S:pt pushing well  O: T-100.9,VSS  Cx: C/0 with caput  FHTs-170s, mod variability    A/P:  38+5 weeks, in labor. Pt has been pushing well for over 1 and a half hours with no further descent of fetal head and now with considerable caput.  Given presence of f

## 2020-03-24 NOTE — PROGRESS NOTES
[address] 2SW-J prosper Hanna Patient Status:  Inpatient   Age/Gender 31year old female MRN [id number]   [facility] 2SW-J Attending Jessica Mayen MD   Hosp Day # 1 PCP Ana Hernandez MD      Anesthesia Pain Progress

## 2020-03-24 NOTE — PROGRESS NOTES
[facility]  Post-Partum Caesarean Section Progress Note    Johan Lopez Patient Status:  Inpatient    1990 MRN [id number]   [facility] 2SW-J Attending Lisy Nash MD   Hosp Day # 1 PCP Krissy Pozo MD     SUBJECTIVE:

## 2020-03-24 NOTE — ANESTHESIA POSTPROCEDURE EVALUATION
[facility] Patient Status:  Inpatient   Age/Gender 31year old female MRN [id number]   Location [address] [facility] Attending Magy Casey MD   Hosp Day # 1 PCP Kalpana Mendoza MD       Anesthesia Post-op Note    Pr

## 2020-03-24 NOTE — PROGRESS NOTES
Patient transferred to mother/baby room 2194 per cart in stable condition with baby and personal belongings. Accompanied by  and staff. Report given to mother/baby RN.

## 2020-03-24 NOTE — PROGRESS NOTES
03/24/20 1114   Provider Notification   Reason for Communication Order clarification   Provider Name Other (comment)  (Gabby Patel)   Method of Communication Call   Response See orders   Notification Time      Per Dr. Monica Richardson, ok to d/c iv and all med

## 2020-03-24 NOTE — OPERATIVE REPORT
[facility]   Section - Operative Note    Vishnu Rodriguez Patient Status:  Inpatient    1990 MRN [id number]   Location [address] [facility] Attending Jana Villarreal MD   Hosp Day # 0 PCP Verito Regan MD   Preoperative Candelaria Pineda of the hysterotomy was closed using 0 Vicryl. A second imbricating layer was placed with 0 Vicryl. The incision was inspected for hemostasis. Re-inspection of the hysterotomy, peritomeum and rectus muscles noted them to be entirely hemostatic.   The cul-

## 2020-03-24 NOTE — H&P
[address] [place] [address] Patient Status:  Inpatient    1990 MRN [id number]   Location [facility] Attending Desiree Gray MD   Hosp Day # 0 PCP Derek Muro MD     Subjective:  Choco Portillo discussed in detail. All questions answered, all appropriate consents will be signed at the Hospital. Admission is planned for today. Augmentation: IV Pitocin augmentation.

## 2020-03-25 PROCEDURE — 59514 CESAREAN DELIVERY ONLY: CPT | Performed by: OBSTETRICS & GYNECOLOGY

## 2020-03-25 RX ORDER — ACETAMINOPHEN 325 MG/1
650 TABLET ORAL EVERY 6 HOURS PRN
Status: DISCONTINUED | OUTPATIENT
Start: 2020-03-25 | End: 2020-03-26

## 2020-03-25 NOTE — PROGRESS NOTES
[facility]  Post-Partum Caesarean Section Progress Note    Darrin Longoria Patient Status:  Inpatient    1990 MRN [id number]   [facility] 2SW-J Attending Nandini Toth MD   Hosp Day # 2 PCP Calos Cee MD     SUBJECTIVE:

## 2020-03-26 VITALS
RESPIRATION RATE: 18 BRPM | TEMPERATURE: 98 F | SYSTOLIC BLOOD PRESSURE: 117 MMHG | HEIGHT: 65 IN | BODY MASS INDEX: 32.32 KG/M2 | HEART RATE: 77 BPM | OXYGEN SATURATION: 98 % | DIASTOLIC BLOOD PRESSURE: 81 MMHG | WEIGHT: 194 LBS

## 2020-03-26 PROBLEM — Z34.90 PREGNANCY (HCC): Status: RESOLVED | Noted: 2020-02-05 | Resolved: 2020-03-26

## 2020-03-26 PROBLEM — Z34.90 PREGNANCY: Status: RESOLVED | Noted: 2020-02-05 | Resolved: 2020-03-26

## 2020-03-26 RX ORDER — HYDROCODONE BITARTRATE AND ACETAMINOPHEN 5; 325 MG/1; MG/1
1 TABLET ORAL EVERY 4 HOURS PRN
Qty: 15 TABLET | Refills: 0 | Status: SHIPPED | OUTPATIENT
Start: 2020-03-26 | End: 2020-08-04 | Stop reason: ALTCHOICE

## 2020-03-26 NOTE — DISCHARGE SUMMARY
[facility]  Discharge Summary    Ej Lewis Patient Status:  Inpatient    1990 MRN [id number]   [facility] 2SW-J Attending Randal Murphy MD   [facility] Day # 3 PCP Bouchra Morris MD     Admit Date:  3/23/2020    EDC: Estimated

## 2020-03-26 NOTE — PROGRESS NOTES
[facility]  Post-Partum Caesarean Section Progress Note    Earnest Indra Patient Status:  Inpatient    1990 MRN [id number]   [facility] 2SW-J Attending Brien Holland MD   Hosp Day # 3 PCP Keo Mckenna MD     SUBJECTIVE:

## 2020-03-28 ENCOUNTER — TELEPHONE (OUTPATIENT)
Dept: OBGYN UNIT | Facility: HOSPITAL | Age: 30
End: 2020-03-28

## 2020-04-09 PROBLEM — Z98.891 S/P PRIMARY LOW TRANSVERSE C-SECTION: Status: ACTIVE | Noted: 2020-04-09

## 2020-05-07 PROCEDURE — 88175 CYTOPATH C/V AUTO FLUID REDO: CPT | Performed by: OBSTETRICS & GYNECOLOGY

## 2020-05-07 PROCEDURE — 87624 HPV HI-RISK TYP POOLED RSLT: CPT | Performed by: OBSTETRICS & GYNECOLOGY

## 2020-07-02 DIAGNOSIS — Z78.9 PARTICIPANT IN HEALTH AND WELLNESS PLAN: Primary | ICD-10-CM

## 2020-07-05 ENCOUNTER — NURSE ONLY (OUTPATIENT)
Dept: LAB | Age: 30
End: 2020-07-05
Attending: PREVENTIVE MEDICINE

## 2020-07-05 DIAGNOSIS — Z78.9 PARTICIPANT IN HEALTH AND WELLNESS PLAN: ICD-10-CM

## 2020-07-05 PROCEDURE — 86769 SARS-COV-2 COVID-19 ANTIBODY: CPT

## 2020-07-07 LAB — SARS-COV-2 IGG SERPLBLD QL IA.RAPID: NEGATIVE

## 2020-11-02 ENCOUNTER — TELEPHONE (OUTPATIENT)
Dept: INTERNAL MEDICINE CLINIC | Facility: HOSPITAL | Age: 30
End: 2020-11-02

## 2020-11-02 DIAGNOSIS — Z20.822 SUSPECTED 2019 NOVEL CORONAVIRUS INFECTION: Primary | ICD-10-CM

## 2020-11-03 ENCOUNTER — APPOINTMENT (OUTPATIENT)
Dept: LAB | Age: 30
End: 2020-11-03
Attending: PREVENTIVE MEDICINE
Payer: COMMERCIAL

## 2020-11-03 DIAGNOSIS — Z20.822 SUSPECTED 2019 NOVEL CORONAVIRUS INFECTION: ICD-10-CM

## 2020-12-18 ENCOUNTER — IMMUNIZATION (OUTPATIENT)
Dept: LAB | Facility: HOSPITAL | Age: 30
End: 2020-12-18
Attending: PREVENTIVE MEDICINE
Payer: COMMERCIAL

## 2020-12-18 DIAGNOSIS — Z23 NEED FOR VACCINATION: ICD-10-CM

## 2020-12-18 PROCEDURE — 0001A PFIZER-BIONTECH COVID-19 VACCINE: CPT

## 2021-01-08 ENCOUNTER — IMMUNIZATION (OUTPATIENT)
Dept: LAB | Facility: HOSPITAL | Age: 31
End: 2021-01-08
Attending: PREVENTIVE MEDICINE
Payer: COMMERCIAL

## 2021-01-08 DIAGNOSIS — Z23 NEED FOR VACCINATION: ICD-10-CM

## 2021-01-08 PROCEDURE — 0002A SARSCOV2 VAC 30MCG/0.3ML IM: CPT

## 2021-05-27 ENCOUNTER — HOSPITAL ENCOUNTER (OUTPATIENT)
Dept: GENERAL RADIOLOGY | Age: 31
Discharge: HOME | End: 2021-05-27
Attending: INTERNAL MEDICINE
Payer: COMMERCIAL

## 2021-05-27 DIAGNOSIS — M25.511 ACUTE PAIN OF RIGHT SHOULDER: ICD-10-CM

## 2021-05-27 PROCEDURE — 73030 X-RAY EXAM OF SHOULDER: CPT | Performed by: INTERNAL MEDICINE

## 2021-06-02 ENCOUNTER — HOSPITAL ENCOUNTER (OUTPATIENT)
Age: 31
Discharge: HOME | End: 2021-06-02
Payer: COMMERCIAL

## 2021-06-02 VITALS
WEIGHT: 168 LBS | DIASTOLIC BLOOD PRESSURE: 73 MMHG | SYSTOLIC BLOOD PRESSURE: 110 MMHG | RESPIRATION RATE: 16 BRPM | BODY MASS INDEX: 27.99 KG/M2 | HEIGHT: 65 IN | HEART RATE: 98 BPM | TEMPERATURE: 98 F | OXYGEN SATURATION: 98 %

## 2021-06-02 DIAGNOSIS — J01.00 ACUTE NON-RECURRENT MAXILLARY SINUSITIS: Primary | ICD-10-CM

## 2021-06-02 PROCEDURE — 99213 OFFICE O/P EST LOW 20 MIN: CPT | Performed by: NURSE PRACTITIONER

## 2021-06-02 RX ORDER — AMOXICILLIN 875 MG/1
875 TABLET, COATED ORAL 2 TIMES DAILY
Qty: 20 TABLET | Refills: 0 | Status: SHIPPED | OUTPATIENT
Start: 2021-06-02 | End: 2021-06-12

## 2021-06-02 RX ORDER — PREDNISONE 20 MG/1
40 TABLET ORAL DAILY
Qty: 10 TABLET | Refills: 0 | Status: SHIPPED | OUTPATIENT
Start: 2021-06-02 | End: 2021-06-07

## 2021-06-02 NOTE — ED PROVIDER NOTES
Patient Seen in: Immediate [address] [place] [address]      History   Patient presents with:  Sinus Problem  Sore Throat  Cough/URI    Stated Complaint: sinus    HPI/Subjective:   79-year-old female presents to immediate care with sinus pain and pressure conge submucous resection and outfracture. No pertinent social history. Review of Systems   Constitutional: Negative. Respiratory: Negative. Cardiovascular: Negative. Gastrointestinal: Negative. Skin: Negative.     Neurologic to the ER precautions. I explained to the patient that emergent conditions may arise to return to the immediate care or ER for new, worsening or any persistent conditions. I've explained the importance of following up with Primary care physicican.   The p

## 2021-06-03 NOTE — ED NOTES
Pt called stating her cough is still bad after being seen yesterday and asking for another cough medicine or an inhaler. GRETCHEN Crawford notified.  Tessalon Pearls 100mg (1 pill TID as needed for cough, 30 ct) called in to pts pharmacy per GRETCHEN Crawford VO with readbac

## 2021-06-28 ENCOUNTER — OFFICE VISIT (OUTPATIENT)
Dept: ORTHOPEDICS CLINIC | Facility: CLINIC | Age: 31
End: 2021-06-28
Payer: COMMERCIAL

## 2021-06-28 DIAGNOSIS — M25.511 ACUTE PAIN OF RIGHT SHOULDER: Primary | ICD-10-CM

## 2021-06-28 PROCEDURE — 99204 OFFICE O/P NEW MOD 45 MIN: CPT | Performed by: ORTHOPAEDIC SURGERY

## 2021-06-28 NOTE — H&P
[facility] - ORTHOPEDICS  [facility] [address] [id number]  [phone]     NEW PATIENT VISIT - HISTORY AND PHYSICAL EXAMINATION     Name: Gerhardt Norton   MRN: [id number]  Date: 6/28/2021     CC: Right sh PAST SURGICAL HX:  Past Surgical History:   Procedure Laterality Date   •   2020   • CHOLECYSTECTOMY  13    by Dr. Freddie Pagan @ [address]   • COLPOSCOPY, CERVIX W/UPPER ADJACENT VAGINA; W/BIOPSY(S), CERVIX  ,   • LEEP  2018   • SINUS SANCHEZ following:    Height as of 6/2/21: 5' 5\" (1.651 m). Weight as of 6/2/21: 168 lb (76.2 kg). Physical Exam  Constitutional:       Appearance: Normal appearance. HENT:      Head: Normocephalic and atraumatic.    Eyes:      Extraocular Movements: Extra Radiographs of the right shoulder personally viewed, independently interpreted and radiology report was reviewed. Demonstrates well-appearing and in humeral joint without any evidence of fracture or dislocation. Normal concentric relationships.   There

## 2021-07-01 ENCOUNTER — TELEPHONE (OUTPATIENT)
Dept: ORTHOPEDICS CLINIC | Facility: CLINIC | Age: 31
End: 2021-07-01

## 2021-07-01 DIAGNOSIS — M25.511 ACUTE PAIN OF RIGHT SHOULDER: Primary | ICD-10-CM

## 2021-07-01 NOTE — TELEPHONE ENCOUNTER
Patient was referred to [facility] physical therapy and would like to go to [facility] physical therapy in the [facility].

## 2021-07-12 ENCOUNTER — OFFICE VISIT (OUTPATIENT)
Dept: FAMILY MEDICINE CLINIC | Facility: CLINIC | Age: 31
End: 2021-07-12
Payer: COMMERCIAL

## 2021-07-12 ENCOUNTER — VIRTUAL PHONE E/M (OUTPATIENT)
Dept: ORTHOPEDICS CLINIC | Facility: CLINIC | Age: 31
End: 2021-07-12
Payer: COMMERCIAL

## 2021-07-12 VITALS
WEIGHT: 168 LBS | DIASTOLIC BLOOD PRESSURE: 80 MMHG | SYSTOLIC BLOOD PRESSURE: 119 MMHG | OXYGEN SATURATION: 98 % | BODY MASS INDEX: 27.99 KG/M2 | TEMPERATURE: 98 F | HEIGHT: 65 IN | RESPIRATION RATE: 16 BRPM

## 2021-07-12 DIAGNOSIS — J06.9 VIRAL UPPER RESPIRATORY TRACT INFECTION WITH COUGH: Primary | ICD-10-CM

## 2021-07-12 DIAGNOSIS — M75.30 CALCIFIC SUPRASPINATUS TENDINITIS: Primary | ICD-10-CM

## 2021-07-12 DIAGNOSIS — Z11.52 ENCOUNTER FOR SCREENING FOR COVID-19: ICD-10-CM

## 2021-07-12 PROCEDURE — 99213 OFFICE O/P EST LOW 20 MIN: CPT | Performed by: ORTHOPAEDIC SURGERY

## 2021-07-12 PROCEDURE — 3074F SYST BP LT 130 MM HG: CPT | Performed by: PHYSICIAN ASSISTANT

## 2021-07-12 PROCEDURE — 3079F DIAST BP 80-89 MM HG: CPT | Performed by: PHYSICIAN ASSISTANT

## 2021-07-12 PROCEDURE — 3008F BODY MASS INDEX DOCD: CPT | Performed by: PHYSICIAN ASSISTANT

## 2021-07-12 PROCEDURE — 99213 OFFICE O/P EST LOW 20 MIN: CPT | Performed by: PHYSICIAN ASSISTANT

## 2021-07-12 RX ORDER — ALBUTEROL SULFATE 90 UG/1
2 AEROSOL, METERED RESPIRATORY (INHALATION) EVERY 4 HOURS PRN
Qty: 1 EACH | Refills: 0 | Status: SHIPPED | OUTPATIENT
Start: 2021-07-12 | End: 2021-10-14

## 2021-07-12 NOTE — PROGRESS NOTES
Patient presents with:  Cough: Cough, low grade fever, chest pain - fully vaccinated - Entered by patient      HPI:   Brenda Kirkland is a 27year old female who presents for cough for 4 days.  States that she went last weekend to a [address] last we pain 7/2011   • Migraines    • Other postprocedural status(V45.89) 9/2010   • Overdose 11/11   • Pain in joint, lower leg 10/1/2012   • Palpitations    • Pharyngitis 3/09    recurrent   • Rectal pain 3/21/2014   • Self-mutilation 2003   • Sinusitis 3/29/07 (36.7 °C) (Tympanic)   Resp 16   Ht 5' 5\" (1.651 m)   Wt 168 lb (76.2 kg)   LMP 05/08/2021   SpO2 98%   BMI 27.96 kg/m²   GENERAL: alert and oriented x 3, no acute distress  SKIN: no rashes, no suspicious lesions  HEAD: atraumatic, normocephalic, non tend side effects of medication explained and discussed. Medications as listed below.     Orders Placed This Encounter      Albuterol Sulfate HFA (PROAIR HFA) 108 (90 Base) MCG/ACT Inhalation Aero Soln    Follow up prn or with pcp/clinic if symptoms develop / wo

## 2021-07-12 NOTE — PROGRESS NOTES
[facility]  [address] [place] [phone]       Name: Lydia Mckeon   MRN: [id number]  Date: 7/12/2021     REASON FOR VISIT: This was a telephone only virtual visit approxima degenerative change or perhaps intraosseous ganglion cyst formation. The bone marrow and musculature otherwise demonstrate normal signal characteristics.  IMPRESSION: Calcific supraspinatus tendinitis with minimal additional bursal surface fraying of the paz

## 2021-07-13 LAB — SARS-COV-2 RNA RESP QL NAA+PROBE: NOT DETECTED

## 2021-07-16 ENCOUNTER — TELEPHONE (OUTPATIENT)
Dept: PHYSICAL THERAPY | Facility: HOSPITAL | Age: 31
End: 2021-07-16

## 2021-07-19 ENCOUNTER — APPOINTMENT (OUTPATIENT)
Dept: PHYSICAL THERAPY | Facility: HOSPITAL | Age: 31
End: 2021-07-19
Attending: ORTHOPAEDIC SURGERY
Payer: COMMERCIAL

## 2021-07-21 ENCOUNTER — APPOINTMENT (OUTPATIENT)
Dept: PHYSICAL THERAPY | Facility: HOSPITAL | Age: 31
End: 2021-07-21
Attending: ORTHOPAEDIC SURGERY
Payer: COMMERCIAL

## 2021-08-20 ENCOUNTER — WALK IN (OUTPATIENT)
Dept: URGENT CARE | Age: 31
End: 2021-08-20

## 2021-08-20 VITALS
DIASTOLIC BLOOD PRESSURE: 72 MMHG | TEMPERATURE: 98.1 F | OXYGEN SATURATION: 98 % | SYSTOLIC BLOOD PRESSURE: 126 MMHG | HEART RATE: 80 BPM | RESPIRATION RATE: 18 BRPM

## 2021-08-20 DIAGNOSIS — J32.0 MAXILLARY SINUSITIS, UNSPECIFIED CHRONICITY: Primary | ICD-10-CM

## 2021-08-20 PROCEDURE — 99203 OFFICE O/P NEW LOW 30 MIN: CPT | Performed by: NURSE PRACTITIONER

## 2021-08-20 RX ORDER — AMOXICILLIN AND CLAVULANATE POTASSIUM 875; 125 MG/1; MG/1
1 TABLET, FILM COATED ORAL EVERY 12 HOURS
Qty: 20 TABLET | Refills: 0 | Status: SHIPPED | OUTPATIENT
Start: 2021-08-20 | End: 2021-08-30

## 2021-08-20 RX ORDER — DEXTROAMPHETAMINE SACCHARATE, AMPHETAMINE ASPARTATE, DEXTROAMPHETAMINE SULFATE AND AMPHETAMINE SULFATE 2.5; 2.5; 2.5; 2.5 MG/1; MG/1; MG/1; MG/1
TABLET ORAL
COMMUNITY
Start: 2021-08-11

## 2021-08-20 RX ORDER — ALBUTEROL SULFATE 90 UG/1
2 AEROSOL, METERED RESPIRATORY (INHALATION)
COMMUNITY
Start: 2021-07-12 | End: 2022-11-08 | Stop reason: ALTCHOICE

## 2021-08-20 RX ORDER — FLUOXETINE HYDROCHLORIDE 40 MG/1
40 CAPSULE ORAL DAILY
COMMUNITY
Start: 2021-08-10

## 2021-08-20 RX ORDER — TRAZODONE HYDROCHLORIDE 100 MG/1
TABLET ORAL
COMMUNITY
Start: 2021-08-10 | End: 2022-11-08 | Stop reason: ALTCHOICE

## 2021-08-20 ASSESSMENT — ENCOUNTER SYMPTOMS
FATIGUE: 0
ALLERGIC/IMMUNOLOGIC NEGATIVE: 1
GASTROINTESTINAL NEGATIVE: 1
RHINORRHEA: 1
SINUS PRESSURE: 1
SORE THROAT: 1
LIGHT-HEADEDNESS: 1
EYES NEGATIVE: 1
COUGH: 1
FEVER: 0

## 2021-08-23 ENCOUNTER — TELEPHONE (OUTPATIENT)
Dept: ORTHOPEDICS CLINIC | Facility: CLINIC | Age: 31
End: 2021-08-23

## 2021-08-23 NOTE — TELEPHONE ENCOUNTER
Pt called and notified she can have a steroid injection with Dr. Gabriel Renee. Pt stated she will call back to schedule when she is ready.

## 2021-08-31 PROCEDURE — 88175 CYTOPATH C/V AUTO FLUID REDO: CPT | Performed by: OBSTETRICS & GYNECOLOGY

## 2021-08-31 PROCEDURE — 87624 HPV HI-RISK TYP POOLED RSLT: CPT | Performed by: OBSTETRICS & GYNECOLOGY

## 2021-09-13 ENCOUNTER — OFFICE VISIT (OUTPATIENT)
Dept: ORTHOPEDICS CLINIC | Facility: CLINIC | Age: 31
End: 2021-09-13
Payer: COMMERCIAL

## 2021-09-13 DIAGNOSIS — M75.30 CALCIFIC SUPRASPINATUS TENDINITIS: Primary | ICD-10-CM

## 2021-09-13 PROCEDURE — 20610 DRAIN/INJ JOINT/BURSA W/O US: CPT | Performed by: ORTHOPAEDIC SURGERY

## 2021-09-13 PROCEDURE — 99213 OFFICE O/P EST LOW 20 MIN: CPT | Performed by: ORTHOPAEDIC SURGERY

## 2021-09-13 RX ORDER — TRIAMCINOLONE ACETONIDE 40 MG/ML
40 INJECTION, SUSPENSION INTRA-ARTICULAR; INTRAMUSCULAR ONCE
Status: COMPLETED | OUTPATIENT
Start: 2021-09-13 | End: 2021-09-13

## 2021-09-13 RX ORDER — KETOROLAC TROMETHAMINE 30 MG/ML
30 INJECTION, SOLUTION INTRAMUSCULAR; INTRAVENOUS ONCE
Status: COMPLETED | OUTPATIENT
Start: 2021-09-13 | End: 2021-09-13

## 2021-09-13 RX ADMIN — KETOROLAC TROMETHAMINE 30 MG: 30 INJECTION, SOLUTION INTRAMUSCULAR; INTRAVENOUS at 14:53:00

## 2021-09-13 RX ADMIN — TRIAMCINOLONE ACETONIDE 40 MG: 40 INJECTION, SUSPENSION INTRA-ARTICULAR; INTRAMUSCULAR at 14:53:00

## 2021-09-13 NOTE — PROCEDURES
Right Shoulder Subacromial Space Injection    Name: Christopher Zavala   MRN: [id number]  Date: 9/13/2021     Clinical Indications:   Subacromial impingement with symptoms refractory to conservative measures.      After informed consent, the injection site

## 2021-09-13 NOTE — PROGRESS NOTES
[facility] - [facility]  [address] [place]       Name: Glenis Nuno   MRN: [id number]  Date: 9/13/2021     REASON FOR VISIT: Follow-up evaluation right shoulder, after comple motion or strength, no positive provocative maneuvers. Radiographic Examination/Diagnostics:    MRI scan of the right shoulder personally viewed, independently interpreted and radiology report was reviewed.   Demonstrates intact rotator cuff with some i

## 2021-10-26 ENCOUNTER — WALK IN (OUTPATIENT)
Dept: URGENT CARE | Age: 31
End: 2021-10-26

## 2021-10-26 VITALS
WEIGHT: 165 LBS | SYSTOLIC BLOOD PRESSURE: 104 MMHG | BODY MASS INDEX: 27.49 KG/M2 | TEMPERATURE: 97.8 F | HEART RATE: 70 BPM | HEIGHT: 65 IN | RESPIRATION RATE: 18 BRPM | DIASTOLIC BLOOD PRESSURE: 70 MMHG

## 2021-10-26 DIAGNOSIS — N30.90 CYSTITIS: Primary | ICD-10-CM

## 2021-10-26 PROCEDURE — 87086 URINE CULTURE/COLONY COUNT: CPT | Performed by: PSYCHIATRY & NEUROLOGY

## 2021-10-26 PROCEDURE — 99213 OFFICE O/P EST LOW 20 MIN: CPT | Performed by: NURSE PRACTITIONER

## 2021-10-26 RX ORDER — NITROFURANTOIN 25; 75 MG/1; MG/1
100 CAPSULE ORAL 2 TIMES DAILY
Qty: 10 CAPSULE | Refills: 0 | Status: SHIPPED | OUTPATIENT
Start: 2021-10-26 | End: 2021-10-31

## 2021-10-26 RX ORDER — LAMOTRIGINE 100 MG/1
100 TABLET ORAL
COMMUNITY
Start: 2021-10-15

## 2021-10-26 RX ORDER — FLUCONAZOLE 150 MG/1
TABLET ORAL
COMMUNITY
Start: 2021-08-31 | End: 2022-11-08 | Stop reason: ALTCHOICE

## 2021-10-26 RX ORDER — LAMOTRIGINE 25 MG/1
50 TABLET ORAL
COMMUNITY
Start: 2021-10-15

## 2021-10-26 ASSESSMENT — ENCOUNTER SYMPTOMS
FEVER: 0
FATIGUE: 0
RESPIRATORY NEGATIVE: 1
GASTROINTESTINAL NEGATIVE: 1

## 2021-10-28 LAB — BACTERIA UR CULT: NORMAL

## 2021-11-30 ENCOUNTER — WALK IN (OUTPATIENT)
Dept: URGENT CARE | Age: 31
End: 2021-11-30

## 2021-11-30 ENCOUNTER — OFFICE VISIT (OUTPATIENT)
Dept: FAMILY MEDICINE CLINIC | Facility: CLINIC | Age: 31
End: 2021-11-30
Payer: COMMERCIAL

## 2021-11-30 VITALS
DIASTOLIC BLOOD PRESSURE: 84 MMHG | RESPIRATION RATE: 16 BRPM | BODY MASS INDEX: 28.32 KG/M2 | HEIGHT: 65 IN | WEIGHT: 170 LBS | OXYGEN SATURATION: 96 % | SYSTOLIC BLOOD PRESSURE: 98 MMHG | HEART RATE: 94 BPM | TEMPERATURE: 98.2 F

## 2021-11-30 VITALS
RESPIRATION RATE: 16 BRPM | OXYGEN SATURATION: 97 % | TEMPERATURE: 99 F | HEIGHT: 65 IN | WEIGHT: 170 LBS | SYSTOLIC BLOOD PRESSURE: 116 MMHG | DIASTOLIC BLOOD PRESSURE: 70 MMHG | BODY MASS INDEX: 28.32 KG/M2 | HEART RATE: 82 BPM

## 2021-11-30 DIAGNOSIS — J06.9 VIRAL UPPER RESPIRATORY TRACT INFECTION: Primary | ICD-10-CM

## 2021-11-30 DIAGNOSIS — H66.002 NON-RECURRENT ACUTE SUPPURATIVE OTITIS MEDIA OF LEFT EAR WITHOUT SPONTANEOUS RUPTURE OF TYMPANIC MEMBRANE: ICD-10-CM

## 2021-11-30 DIAGNOSIS — J01.01 ACUTE RECURRENT MAXILLARY SINUSITIS: Primary | ICD-10-CM

## 2021-11-30 PROCEDURE — 99213 OFFICE O/P EST LOW 20 MIN: CPT | Performed by: NURSE PRACTITIONER

## 2021-11-30 PROCEDURE — 3008F BODY MASS INDEX DOCD: CPT | Performed by: NURSE PRACTITIONER

## 2021-11-30 PROCEDURE — 3078F DIAST BP <80 MM HG: CPT | Performed by: NURSE PRACTITIONER

## 2021-11-30 PROCEDURE — 3074F SYST BP LT 130 MM HG: CPT | Performed by: NURSE PRACTITIONER

## 2021-11-30 RX ORDER — LAMOTRIGINE 150 MG/1
150 TABLET ORAL
COMMUNITY

## 2021-11-30 RX ORDER — QUETIAPINE FUMARATE 50 MG/1
TABLET, FILM COATED ORAL
COMMUNITY
Start: 2021-11-09

## 2021-11-30 RX ORDER — FLUOXETINE HYDROCHLORIDE 20 MG/1
CAPSULE ORAL
COMMUNITY
Start: 2021-11-22

## 2021-11-30 RX ORDER — DIAZEPAM 5 MG/1
5 TABLET ORAL 3 TIMES DAILY
COMMUNITY
Start: 2021-11-16 | End: 2022-11-08 | Stop reason: ALTCHOICE

## 2021-11-30 RX ORDER — LAMOTRIGINE 150 MG/1
150 TABLET ORAL 2 TIMES DAILY
COMMUNITY
End: 2021-11-30

## 2021-11-30 RX ORDER — AMOXICILLIN AND CLAVULANATE POTASSIUM 875; 125 MG/1; MG/1
1 TABLET, FILM COATED ORAL 2 TIMES DAILY
Qty: 14 TABLET | Refills: 0 | Status: SHIPPED | OUTPATIENT
Start: 2021-11-30 | End: 2021-12-07

## 2021-11-30 RX ORDER — BENZTROPINE MESYLATE 1 MG/1
1 TABLET ORAL 2 TIMES DAILY
COMMUNITY
Start: 2021-11-16 | End: 2022-11-08 | Stop reason: ALTCHOICE

## 2021-11-30 RX ORDER — TRAZODONE HYDROCHLORIDE 150 MG/1
TABLET ORAL
COMMUNITY
Start: 2021-11-15 | End: 2022-11-08 | Stop reason: ALTCHOICE

## 2021-11-30 RX ORDER — FLUOXETINE HYDROCHLORIDE 20 MG/1
20 CAPSULE ORAL
COMMUNITY
Start: 2021-11-22

## 2021-11-30 RX ORDER — TRAZODONE HYDROCHLORIDE 150 MG/1
150 TABLET ORAL
COMMUNITY
Start: 2021-11-15 | End: 2022-11-08 | Stop reason: ALTCHOICE

## 2021-11-30 ASSESSMENT — ENCOUNTER SYMPTOMS
ABDOMINAL PAIN: 0
FATIGUE: 0
BACK PAIN: 0
SHORTNESS OF BREATH: 0
WEAKNESS: 0
CHANGE IN BOWEL HABIT: 0
CHILLS: 0
NUMBNESS: 0
DIARRHEA: 0
SINUS PRESSURE: 1
RHINORRHEA: 1
VOMITING: 0
NAUSEA: 0
DIAPHORESIS: 0
WHEEZING: 0
SORE THROAT: 0
COUGH: 1
VERTIGO: 0
VISUAL CHANGE: 0
ANOREXIA: 0
FEVER: 0
SWOLLEN GLANDS: 0
SINUS PAIN: 1
APPETITE CHANGE: 0
TROUBLE SWALLOWING: 0
HEADACHES: 1

## 2021-11-30 ASSESSMENT — PAIN SCALES - GENERAL: PAINLEVEL: 6

## 2021-11-30 NOTE — PATIENT INSTRUCTIONS
Viral Upper Respiratory Illness (Adult)    You have a viral upper respiratory illness (URI), which is another term for the common cold. This illness is contagious during the first few days. It is spread through the air by coughing and sneezing.  It may al decongestants if you have high blood pressure.)  Follow-up care  Follow up with your healthcare provider, or as advised.   When to seek medical advice  Call your healthcare provider right away if any of these occur:  · Cough with lots of colored sputum (muc least 15 minutes  * You provided care at home to someone who is sick with COVID-19  * You had direct physical contact with the person (touched, hugged, or kissed them)  * You shared eating or drinking utensils  * They sneezed, coughed, or somehow got respi healthcare provider ahead of time and tell them that you have or may have COVID-19.  5. For medical emergencies, call 911 and notify the dispatch personnel that you have or may have COVID-19.   6. Cover your cough and sneezes.    7. Wash your hands often wi first appeared OR if asymptomatic patient or date of symptom onset is unclear then use 10 days post COVID test date. · At least 20 days have passed for severe illness (requiring hospitalization) OR if you are immunocompromised.   If you have a fever with your plasma to help treat others diagnosed with the virus, please contact [facility] directly on their website: [email or web address]    Who is eligible to donate convalescent plasma?     Potential convalescent plasma donor Shortness of breath Hair loss   GI disturbances  Fever  Swelling Joint Pain  Cough         Who is at risk for a Post-COVID condition? It is still too early to say for sure.   Currently, we find the people who experience Post-COVID conditions to be random

## 2021-11-30 NOTE — PROGRESS NOTES
CHIEF COMPLAINT:   Patient presents with:  Sinus Problem: Entered by patient      HPI:   Svitlana Mejia is a 27year old female who presents for upper respiratory symptoms for  3.  Reports severe sinus congestion, discolored nasal congestion, HA, and d by Dr. Ruben Reid @ [facility]   • COLPOSCOPY, CERVIX W/UPPER ADJACENT VAGINA; W/BIOPSY(S), CERVIX  [id number]   • LEEP  2018   • SINUS SURGERY    2010    bilat endo sofia bullosa and middle turbinate  hypertrophy takedown w/right endoscopic maxillary antrostom diagnosis)    PLAN:   Covid test sent- time fame for results reviewed. Educated on bacterial vs viral sinusitis- this is 3 days of symptoms, no abx at this time. Patient reports she will go somewhere else. Discussed viral vs bacterial etiology of URIs. provider before using these medicines. Aspirin should never be given to anyone under 25years of age who is ill with a viral infection or fever. It may cause severe liver or brain damage. · Your appetite may be poor, so a light diet is fine.  Stay well hyd community members reliable answers to any questions they may have. Please review the entirety of this informational document. It includes information related to exposure, pending tests, positive results, aftercare, and plasma donation.       Quarantine public health authority or healthcare provider. • Wear a mask, stay at least 6 feet from others, wash your hands, avoid crowds, and take other steps to prevent the spread of COVID-19.   CDC continues to endorse quarantine for 14 days and recognizes that an provider before seeking further care. Process measures to keep everyone safe in this difficult time are changing frequently. Your healthcare provider can help direct you on next steps.     If you have not been exposed or are not aware of an exposure to COV test.  Convalescent Plasma Donation Program  [facility], in conjunction with [facility] is looking for patients who have recovered from COVID-19 and would be interested in donating plasma.     Convalescent plasma is a component of blood that [employer].nl. pdf  [employer]  http://www.[facility].[place].gov/topics-services/diseases-and-conditions/dise https://health.[facility]/coronavirus/covid-19-information/covid-19-long-haulers. html  Long-term effects of covid-19. (n.d.).  Retrieved May 11, 2021, from [employer].  What it means to be A Coronavirus

## 2021-12-21 PROCEDURE — 80346 BENZODIAZEPINES1-12: CPT | Performed by: NURSE PRACTITIONER

## 2021-12-28 ENCOUNTER — LAB ENCOUNTER (OUTPATIENT)
Dept: LAB | Age: 31
End: 2021-12-28
Attending: NURSE PRACTITIONER
Payer: COMMERCIAL

## 2021-12-28 DIAGNOSIS — Z79.899 MEDICATION MANAGEMENT: ICD-10-CM

## 2021-12-28 PROCEDURE — 36415 COLL VENOUS BLD VENIPUNCTURE: CPT

## 2021-12-28 PROCEDURE — 85025 COMPLETE CBC W/AUTO DIFF WBC: CPT

## 2021-12-28 PROCEDURE — 82306 VITAMIN D 25 HYDROXY: CPT

## 2021-12-28 PROCEDURE — 80053 COMPREHEN METABOLIC PANEL: CPT

## 2022-01-10 ENCOUNTER — IMMUNIZATION (OUTPATIENT)
Dept: LAB | Facility: HOSPITAL | Age: 32
End: 2022-01-10
Attending: EMERGENCY MEDICINE
Payer: COMMERCIAL

## 2022-01-10 DIAGNOSIS — Z23 NEED FOR VACCINATION: Primary | ICD-10-CM

## 2022-01-10 PROCEDURE — 0004A SARSCOV2 VAC 30MCG/0.3ML IM: CPT

## 2022-01-10 PROCEDURE — 0054A SARSCOV2 VAC 30MCG/0.3ML IM: CPT

## 2022-03-29 PROCEDURE — 80307 DRUG TEST PRSMV CHEM ANLYZR: CPT | Performed by: NURSE PRACTITIONER

## 2022-03-30 NOTE — ED INITIAL ASSESSMENT (HPI)
Pt c/o sinus congestion, sore throat and cough. Pt states she has a history of sinus infections and her symptoms feel the same. Pt denies fever.
no concerns

## 2022-07-20 ENCOUNTER — OFFICE VISIT (OUTPATIENT)
Dept: URGENT CARE | Age: 32
End: 2022-07-20

## 2022-07-20 ENCOUNTER — TELEPHONE (OUTPATIENT)
Dept: SCHEDULING | Age: 32
End: 2022-07-20

## 2022-07-20 VITALS
RESPIRATION RATE: 16 BRPM | HEIGHT: 65 IN | OXYGEN SATURATION: 97 % | HEART RATE: 100 BPM | SYSTOLIC BLOOD PRESSURE: 100 MMHG | BODY MASS INDEX: 27.02 KG/M2 | TEMPERATURE: 98.3 F | WEIGHT: 162.2 LBS | DIASTOLIC BLOOD PRESSURE: 70 MMHG

## 2022-07-20 DIAGNOSIS — J01.00 ACUTE NON-RECURRENT MAXILLARY SINUSITIS: Primary | ICD-10-CM

## 2022-07-20 PROCEDURE — U0003 INFECTIOUS AGENT DETECTION BY NUCLEIC ACID (DNA OR RNA); SEVERE ACUTE RESPIRATORY SYNDROME CORONAVIRUS 2 (SARS-COV-2) (CORONAVIRUS DISEASE [COVID-19]), AMPLIFIED PROBE TECHNIQUE, MAKING USE OF HIGH THROUGHPUT TECHNOLOGIES AS DESCRIBED BY CMS-2020-01-R: HCPCS | Performed by: INTERNAL MEDICINE

## 2022-07-20 PROCEDURE — 99213 OFFICE O/P EST LOW 20 MIN: CPT | Performed by: NURSE PRACTITIONER

## 2022-07-20 PROCEDURE — U0005 INFEC AGEN DETEC AMPLI PROBE: HCPCS | Performed by: INTERNAL MEDICINE

## 2022-07-20 RX ORDER — FLUTICASONE PROPIONATE 50 MCG
2 SPRAY, SUSPENSION (ML) NASAL DAILY
Qty: 16 G | Refills: 0 | Status: SHIPPED | OUTPATIENT
Start: 2022-07-20

## 2022-07-20 RX ORDER — FLUTICASONE PROPIONATE 50 MCG
SPRAY, SUSPENSION (ML) NASAL
Qty: 48 G | OUTPATIENT
Start: 2022-07-20

## 2022-07-20 RX ORDER — AMOXICILLIN AND CLAVULANATE POTASSIUM 875; 125 MG/1; MG/1
1 TABLET, FILM COATED ORAL EVERY 12 HOURS
Qty: 20 TABLET | Refills: 0 | Status: SHIPPED | OUTPATIENT
Start: 2022-07-20 | End: 2022-07-30

## 2022-07-20 ASSESSMENT — ENCOUNTER SYMPTOMS
FEVER: 1
RHINORRHEA: 1
SINUS PAIN: 0
DIZZINESS: 0
VOMITING: 0
SORE THROAT: 1
SWOLLEN GLANDS: 0
WEAKNESS: 0
DIARRHEA: 0
CHEST TIGHTNESS: 0
SHORTNESS OF BREATH: 0
CONSTIPATION: 0
WHEEZING: 0
VERTIGO: 0
COUGH: 0
CHILLS: 0
NAUSEA: 0
ABDOMINAL PAIN: 0
HEADACHES: 1
ALLERGIC/IMMUNOLOGIC NEGATIVE: 1
ANOREXIA: 0
SEIZURES: 0
EYES NEGATIVE: 1
VISUAL CHANGE: 0
RESPIRATORY NEGATIVE: 1
FATIGUE: 0
DIAPHORESIS: 0
HEMATOLOGIC/LYMPHATIC NEGATIVE: 1
GASTROINTESTINAL NEGATIVE: 1
APPETITE CHANGE: 0
NUMBNESS: 0
SINUS PRESSURE: 1
LIGHT-HEADEDNESS: 0
CHANGE IN BOWEL HABIT: 0
PSYCHIATRIC NEGATIVE: 1

## 2022-07-21 ENCOUNTER — TELEPHONE (OUTPATIENT)
Dept: URGENT CARE | Age: 32
End: 2022-07-21

## 2022-07-21 LAB
SARS-COV-2 RNA RESP QL NAA+PROBE: NOT DETECTED
SERVICE CMNT-IMP: NORMAL
SERVICE CMNT-IMP: NORMAL

## 2022-08-16 ENCOUNTER — EKG ENCOUNTER (OUTPATIENT)
Dept: LAB | Age: 32
End: 2022-08-16
Attending: INTERNAL MEDICINE
Payer: COMMERCIAL

## 2022-08-16 ENCOUNTER — LAB ENCOUNTER (OUTPATIENT)
Dept: LAB | Age: 32
End: 2022-08-16
Attending: INTERNAL MEDICINE
Payer: COMMERCIAL

## 2022-08-16 ENCOUNTER — OFFICE VISIT (OUTPATIENT)
Dept: INTERNAL MEDICINE CLINIC | Facility: CLINIC | Age: 32
End: 2022-08-16
Payer: COMMERCIAL

## 2022-08-16 VITALS
DIASTOLIC BLOOD PRESSURE: 78 MMHG | BODY MASS INDEX: 27.82 KG/M2 | WEIGHT: 167 LBS | HEART RATE: 103 BPM | HEIGHT: 65 IN | SYSTOLIC BLOOD PRESSURE: 113 MMHG

## 2022-08-16 DIAGNOSIS — G43.009 MIGRAINE WITHOUT AURA AND WITHOUT STATUS MIGRAINOSUS, NOT INTRACTABLE: ICD-10-CM

## 2022-08-16 DIAGNOSIS — R63.2 BINGE EATING: ICD-10-CM

## 2022-08-16 DIAGNOSIS — E66.3 OVERWEIGHT (BMI 25.0-29.9): ICD-10-CM

## 2022-08-16 DIAGNOSIS — F33.1 MAJOR DEPRESSIVE DISORDER, RECURRENT EPISODE, MODERATE (HCC): ICD-10-CM

## 2022-08-16 DIAGNOSIS — F90.9 ATTENTION DEFICIT HYPERACTIVITY DISORDER (ADHD), UNSPECIFIED ADHD TYPE: ICD-10-CM

## 2022-08-16 DIAGNOSIS — Z79.899 MEDICATION MANAGEMENT: ICD-10-CM

## 2022-08-16 DIAGNOSIS — E66.3 OVERWEIGHT (BMI 25.0-29.9): Primary | ICD-10-CM

## 2022-08-16 LAB
ALBUMIN SERPL-MCNC: 3.5 G/DL (ref 3.4–5)
ALBUMIN/GLOB SERPL: 1 {RATIO} (ref 1–2)
ALP LIVER SERPL-CCNC: 63 U/L
ALT SERPL-CCNC: 15 U/L
ANION GAP SERPL CALC-SCNC: 4 MMOL/L (ref 0–18)
AST SERPL-CCNC: 11 U/L (ref 15–37)
BASOPHILS # BLD AUTO: 0.04 X10(3) UL (ref 0–0.2)
BASOPHILS NFR BLD AUTO: 0.5 %
BILIRUB SERPL-MCNC: 0.2 MG/DL (ref 0.1–2)
BUN BLD-MCNC: 8 MG/DL (ref 7–18)
BUN/CREAT SERPL: 12.3 (ref 10–20)
CALCIUM BLD-MCNC: 8.8 MG/DL (ref 8.5–10.1)
CHLORIDE SERPL-SCNC: 106 MMOL/L (ref 98–112)
CHOLEST SERPL-MCNC: 216 MG/DL (ref ?–200)
CO2 SERPL-SCNC: 28 MMOL/L (ref 21–32)
CONTROL LINE PRESENT WITH A CLEAR BACKGROUND (YES/NO): YES YES/NO
CREAT BLD-MCNC: 0.65 MG/DL
DEPRECATED RDW RBC AUTO: 38.5 FL (ref 35.1–46.3)
EOSINOPHIL # BLD AUTO: 0.34 X10(3) UL (ref 0–0.7)
EOSINOPHIL NFR BLD AUTO: 4.6 %
ERYTHROCYTE [DISTWIDTH] IN BLOOD BY AUTOMATED COUNT: 11.6 % (ref 11–15)
FASTING PATIENT LIPID ANSWER: YES
FASTING STATUS PATIENT QL REPORTED: YES
GFR SERPLBLD BASED ON 1.73 SQ M-ARVRAT: 119 ML/MIN/1.73M2 (ref 60–?)
GLOBULIN PLAS-MCNC: 3.4 G/DL (ref 2.8–4.4)
GLUCOSE BLD-MCNC: 93 MG/DL (ref 70–99)
HCT VFR BLD AUTO: 39.9 %
HDLC SERPL-MCNC: 47 MG/DL (ref 40–59)
HGB BLD-MCNC: 12.9 G/DL
IMM GRANULOCYTES # BLD AUTO: 0.03 X10(3) UL (ref 0–1)
IMM GRANULOCYTES NFR BLD: 0.4 %
KIT EXPIRATION DATE: NORMAL DATE
LDLC SERPL CALC-MCNC: 151 MG/DL (ref ?–100)
LYMPHOCYTES # BLD AUTO: 2.92 X10(3) UL (ref 1–4)
LYMPHOCYTES NFR BLD AUTO: 39.6 %
MCH RBC QN AUTO: 29.3 PG (ref 26–34)
MCHC RBC AUTO-ENTMCNC: 32.3 G/DL (ref 31–37)
MCV RBC AUTO: 90.5 FL
MONOCYTES # BLD AUTO: 0.65 X10(3) UL (ref 0.1–1)
MONOCYTES NFR BLD AUTO: 8.8 %
NEUTROPHILS # BLD AUTO: 3.39 X10 (3) UL (ref 1.5–7.7)
NEUTROPHILS # BLD AUTO: 3.39 X10(3) UL (ref 1.5–7.7)
NEUTROPHILS NFR BLD AUTO: 46.1 %
NONHDLC SERPL-MCNC: 169 MG/DL (ref ?–130)
OSMOLALITY SERPL CALC.SUM OF ELEC: 284 MOSM/KG (ref 275–295)
PLATELET # BLD AUTO: 241 10(3)UL (ref 150–450)
POTASSIUM SERPL-SCNC: 3.7 MMOL/L (ref 3.5–5.1)
PREGNANCY TEST, URINE: NEGATIVE
PROT SERPL-MCNC: 6.9 G/DL (ref 6.4–8.2)
RBC # BLD AUTO: 4.41 X10(6)UL
SODIUM SERPL-SCNC: 138 MMOL/L (ref 136–145)
TRIGL SERPL-MCNC: 98 MG/DL (ref 30–149)
TSI SER-ACNC: 2.03 MIU/ML (ref 0.36–3.74)
VLDLC SERPL CALC-MCNC: 19 MG/DL (ref 0–30)
WBC # BLD AUTO: 7.4 X10(3) UL (ref 4–11)

## 2022-08-16 PROCEDURE — 3008F BODY MASS INDEX DOCD: CPT | Performed by: INTERNAL MEDICINE

## 2022-08-16 PROCEDURE — 81025 URINE PREGNANCY TEST: CPT | Performed by: INTERNAL MEDICINE

## 2022-08-16 PROCEDURE — 36415 COLL VENOUS BLD VENIPUNCTURE: CPT

## 2022-08-16 PROCEDURE — 85025 COMPLETE CBC W/AUTO DIFF WBC: CPT

## 2022-08-16 PROCEDURE — 80061 LIPID PANEL: CPT

## 2022-08-16 PROCEDURE — 3074F SYST BP LT 130 MM HG: CPT | Performed by: INTERNAL MEDICINE

## 2022-08-16 PROCEDURE — 99215 OFFICE O/P EST HI 40 MIN: CPT | Performed by: INTERNAL MEDICINE

## 2022-08-16 PROCEDURE — 93005 ELECTROCARDIOGRAM TRACING: CPT

## 2022-08-16 PROCEDURE — 84443 ASSAY THYROID STIM HORMONE: CPT

## 2022-08-16 PROCEDURE — 93010 ELECTROCARDIOGRAM REPORT: CPT | Performed by: NURSE PRACTITIONER

## 2022-08-16 PROCEDURE — 80053 COMPREHEN METABOLIC PANEL: CPT

## 2022-08-16 PROCEDURE — 3078F DIAST BP <80 MM HG: CPT | Performed by: INTERNAL MEDICINE

## 2022-08-21 ENCOUNTER — MOBILE ENCOUNTER (OUTPATIENT)
Dept: INTERNAL MEDICINE CLINIC | Facility: CLINIC | Age: 32
End: 2022-08-21

## 2022-08-22 ENCOUNTER — TELEPHONE (OUTPATIENT)
Dept: FAMILY MEDICINE CLINIC | Facility: CLINIC | Age: 32
End: 2022-08-22

## 2022-08-22 DIAGNOSIS — E66.3 OVERWEIGHT (BMI 25.0-29.9): Primary | ICD-10-CM

## 2022-09-07 ENCOUNTER — HOSPITAL ENCOUNTER (OUTPATIENT)
Age: 32
Discharge: HOME | End: 2022-09-07
Payer: COMMERCIAL

## 2022-09-07 VITALS
OXYGEN SATURATION: 98 % | SYSTOLIC BLOOD PRESSURE: 110 MMHG | WEIGHT: 157 LBS | BODY MASS INDEX: 27 KG/M2 | RESPIRATION RATE: 18 BRPM | TEMPERATURE: 97 F | HEART RATE: 94 BPM | DIASTOLIC BLOOD PRESSURE: 81 MMHG

## 2022-09-07 DIAGNOSIS — J01.40 ACUTE NON-RECURRENT PANSINUSITIS: ICD-10-CM

## 2022-09-07 DIAGNOSIS — R09.81 NASAL CONGESTION: Primary | ICD-10-CM

## 2022-09-07 LAB — SARS-COV-2 RNA RESP QL NAA+PROBE: NOT DETECTED

## 2022-09-07 PROCEDURE — 99213 OFFICE O/P EST LOW 20 MIN: CPT | Performed by: PHYSICIAN ASSISTANT

## 2022-09-07 PROCEDURE — U0002 COVID-19 LAB TEST NON-CDC: HCPCS | Performed by: PHYSICIAN ASSISTANT

## 2022-09-07 RX ORDER — DOXYCYCLINE HYCLATE 100 MG/1
100 CAPSULE ORAL 2 TIMES DAILY
Qty: 14 CAPSULE | Refills: 0 | Status: SHIPPED | OUTPATIENT
Start: 2022-09-07 | End: 2022-09-14

## 2022-09-07 RX ORDER — FLUTICASONE PROPIONATE 50 MCG
2 SPRAY, SUSPENSION (ML) NASAL DAILY
Qty: 16 G | Refills: 0 | Status: SHIPPED | OUTPATIENT
Start: 2022-09-07 | End: 2022-10-07

## 2022-09-18 ENCOUNTER — HOSPITAL ENCOUNTER (OUTPATIENT)
Age: 32
Discharge: HOME | End: 2022-09-18
Payer: COMMERCIAL

## 2022-09-18 VITALS
SYSTOLIC BLOOD PRESSURE: 114 MMHG | RESPIRATION RATE: 18 BRPM | OXYGEN SATURATION: 98 % | DIASTOLIC BLOOD PRESSURE: 72 MMHG | HEART RATE: 90 BPM | TEMPERATURE: 98 F

## 2022-09-18 DIAGNOSIS — J02.0 STREPTOCOCCAL SORE THROAT: Primary | ICD-10-CM

## 2022-09-18 DIAGNOSIS — J01.90 ACUTE NON-RECURRENT SINUSITIS, UNSPECIFIED LOCATION: ICD-10-CM

## 2022-09-18 DIAGNOSIS — Z20.822 ENCOUNTER FOR SCREENING LABORATORY TESTING FOR COVID-19 VIRUS: ICD-10-CM

## 2022-09-18 LAB
S PYO AG THROAT QL: POSITIVE
SARS-COV-2 RNA RESP QL NAA+PROBE: NOT DETECTED

## 2022-09-18 PROCEDURE — 99213 OFFICE O/P EST LOW 20 MIN: CPT | Performed by: PHYSICIAN ASSISTANT

## 2022-09-18 PROCEDURE — U0002 COVID-19 LAB TEST NON-CDC: HCPCS | Performed by: PHYSICIAN ASSISTANT

## 2022-09-18 PROCEDURE — 87880 STREP A ASSAY W/OPTIC: CPT | Performed by: PHYSICIAN ASSISTANT

## 2022-09-18 RX ORDER — AMOXICILLIN AND CLAVULANATE POTASSIUM 875; 125 MG/1; MG/1
1 TABLET, FILM COATED ORAL 2 TIMES DAILY
Qty: 20 TABLET | Refills: 0 | Status: SHIPPED | OUTPATIENT
Start: 2022-09-18 | End: 2022-09-28

## 2022-09-18 NOTE — ED INITIAL ASSESSMENT (HPI)
Pt. Began 14 days ago with a sinus infection was treated with doxy, and she never fully got better, and now has a sore throat and swollen glands as well

## 2022-09-25 ENCOUNTER — TELEPHONE (OUTPATIENT)
Dept: INTERNAL MEDICINE CLINIC | Facility: HOSPITAL | Age: 32
End: 2022-09-25

## 2022-09-25 ENCOUNTER — LAB ENCOUNTER (OUTPATIENT)
Dept: LAB | Age: 32
End: 2022-09-25
Attending: PREVENTIVE MEDICINE

## 2022-09-25 DIAGNOSIS — Z20.822 SUSPECTED 2019 NOVEL CORONAVIRUS INFECTION: Primary | ICD-10-CM

## 2022-09-25 DIAGNOSIS — Z20.822 SUSPECTED 2019 NOVEL CORONAVIRUS INFECTION: ICD-10-CM

## 2022-09-26 LAB — SARS-COV-2 RNA RESP QL NAA+PROBE: DETECTED

## 2022-09-27 NOTE — TELEPHONE ENCOUNTER
Results and RTW guidelines:    COVID RESULT:    [x] Viewed by employee in [address] RTW plan and instructions as indicated on triage call. Manager notified. Estimated RTW date:   [] Discussed with employee   [x] Unable to reach by phone. Sent via [employer] message      Test type:    [x] Rapid         [] Alinity         [] Outside test:         [x] Positive  Is employee unvaccinated? Yes []   No [x]          If yes:  Enter Covid Positive Medical exemption on Exemption Spreadsheet    Did you have close contact with someone on your unit while not wearing a mask? (e.g., during meal breaks):  Yes []   No []     If yes, who:     - Employee should quarantine at home for at least 5 days (day 1 is day after sx onset) , follow the CDC guidelines for cleaning and                              quarantining; see CDC.gov   -This employee may RTW on day 6 if asymptomatic or mildly symptomatic (with improving symptoms). Call Employee Health on day 5 if unable to return on day 6 after                      symptom onset.    -This employee needs to call [employer] on day 5 after symptom onset. The employee needs to be cleared by [employer]. - Monitor symptoms and temperature                 - Notify PCP of result                 - Seek emergent care with worsening symptoms   - If employee is still experiencing severe symptoms on day 5 must make a RTW appt with [employer], Employee will not be cleared if:    1. Has consistent cough, shortness of breath or fatigue that restricts your physical activities    2. Is still feeling \"unwell\"    3. Within 15 days of hospitalization for COVID    4. Within 20 days of intubation for COVID    5.  Still has a fever, vomiting or diarrhea   - Keep communication open with management about RTW and if symptoms worsen                - If outside testing completed, bring a copy of result to RTW appointment           Notes:     RTW PLAN:    [x]  If COVID positive results, off work minimum of 5 days from positive test or onset of symptoms (day 0)        On day 5, if asymptomatic or mildly symptomatic (with improving symptoms) may return to work day 6          On day 5, if symptomatic, call Employee Health for RTW screening        []  COVID positive result - call Employee Health on day 5 after symptom onset. The employee needs to be cleared by Employee Health to RTW. [] RTW immediately, continue to monitor for sx  [] RTW when sx improve; must be fever free for 24 hours w/o medications, Diarrhea/Vomiting free for 24 hours w/o medications  [] Alinity ordered; continue to monitor sx and call for new/worsening sx.   Discuss RTW guidelines with manager  [] May continue to work  [x] Follow up with PCP  [] Home until further instruction from hotline with [facility] results  INSTRUCTIONS PROVIDED:  [x]  Plan as noted above  []  Length of time to obtain results   [x]  Quarantine instructions  [x]  Masking protocol   []  S/S of worsening infection/condition and importance of prompt medical re-evaluation including when to seek emergency care  [] If symptoms develop, stay home and call hotline for rapid test order    Estimated RTW date:  9/28/22    [] The employee voiced understanding of above plan/instructions  [x] Manager Notified

## 2022-10-19 ENCOUNTER — OFFICE VISIT (OUTPATIENT)
Dept: ORTHOPEDICS CLINIC | Facility: CLINIC | Age: 32
End: 2022-10-19
Payer: COMMERCIAL

## 2022-10-19 DIAGNOSIS — M25.512 ACUTE PAIN OF LEFT SHOULDER: ICD-10-CM

## 2022-10-19 DIAGNOSIS — M75.30 CALCIFIC SUPRASPINATUS TENDINITIS: Primary | ICD-10-CM

## 2022-10-19 PROCEDURE — 99214 OFFICE O/P EST MOD 30 MIN: CPT | Performed by: ORTHOPAEDIC SURGERY

## 2022-11-08 ENCOUNTER — WALK IN (OUTPATIENT)
Dept: URGENT CARE | Age: 32
End: 2022-11-08

## 2022-11-08 VITALS
SYSTOLIC BLOOD PRESSURE: 120 MMHG | RESPIRATION RATE: 18 BRPM | HEIGHT: 65 IN | HEART RATE: 97 BPM | WEIGHT: 165 LBS | DIASTOLIC BLOOD PRESSURE: 84 MMHG | BODY MASS INDEX: 27.49 KG/M2 | TEMPERATURE: 98.3 F | OXYGEN SATURATION: 97 %

## 2022-11-08 DIAGNOSIS — J06.9 VIRAL URI WITH COUGH: ICD-10-CM

## 2022-11-08 DIAGNOSIS — J32.9 CHRONIC SINUSITIS, UNSPECIFIED LOCATION: Primary | ICD-10-CM

## 2022-11-08 PROCEDURE — 99213 OFFICE O/P EST LOW 20 MIN: CPT | Performed by: NURSE PRACTITIONER

## 2022-11-08 RX ORDER — FLUOXETINE HYDROCHLORIDE 20 MG/1
20 CAPSULE ORAL
COMMUNITY
Start: 2022-10-12 | End: 2023-01-10

## 2022-11-08 RX ORDER — LAMOTRIGINE 200 MG/1
200 TABLET ORAL
COMMUNITY
Start: 2022-08-31 | End: 2022-11-29

## 2022-11-08 RX ORDER — ESZOPICLONE 3 MG/1
TABLET, FILM COATED ORAL
COMMUNITY
Start: 2022-11-03

## 2022-11-08 RX ORDER — AMOXICILLIN AND CLAVULANATE POTASSIUM 875; 125 MG/1; MG/1
1 TABLET, FILM COATED ORAL EVERY 12 HOURS
Qty: 20 TABLET | Refills: 0 | Status: SHIPPED | OUTPATIENT
Start: 2022-11-08 | End: 2022-11-18

## 2022-11-08 RX ORDER — DOXEPIN HYDROCHLORIDE 75 MG/1
CAPSULE ORAL
COMMUNITY
Start: 2022-11-06

## 2022-11-08 RX ORDER — ALPRAZOLAM 0.5 MG/1
TABLET ORAL
COMMUNITY
Start: 2022-08-31

## 2022-11-08 RX ORDER — LISDEXAMFETAMINE DIMESYLATE 40 MG/1
CAPSULE ORAL
COMMUNITY
Start: 2022-11-04

## 2022-11-08 ASSESSMENT — ENCOUNTER SYMPTOMS
NAUSEA: 0
NUMBNESS: 0
DIZZINESS: 0
CHEST TIGHTNESS: 0
FACIAL SWELLING: 0
SORE THROAT: 0
EYE REDNESS: 0
SHORTNESS OF BREATH: 0
WEAKNESS: 0
ABDOMINAL PAIN: 0
CONSTIPATION: 0
APPETITE CHANGE: 0
SLEEP DISTURBANCE: 1
TROUBLE SWALLOWING: 0
DIARRHEA: 0
SPEECH DIFFICULTY: 0
RHINORRHEA: 1
DIAPHORESIS: 0
SINUS PAIN: 1
BACK PAIN: 0
LIGHT-HEADEDNESS: 0
FEVER: 0
CHILLS: 0
HEADACHES: 1
ACTIVITY CHANGE: 0
FATIGUE: 0
WHEEZING: 0
VOMITING: 0
VOICE CHANGE: 0
COUGH: 1
EYES NEGATIVE: 1
SINUS PRESSURE: 1

## 2022-11-23 ENCOUNTER — HOSPITAL ENCOUNTER (OUTPATIENT)
Age: 32
Discharge: HOME | End: 2022-11-23
Payer: COMMERCIAL

## 2022-11-23 VITALS
HEIGHT: 65 IN | HEART RATE: 106 BPM | BODY MASS INDEX: 28.32 KG/M2 | RESPIRATION RATE: 26 BRPM | OXYGEN SATURATION: 99 % | WEIGHT: 170 LBS | DIASTOLIC BLOOD PRESSURE: 89 MMHG | SYSTOLIC BLOOD PRESSURE: 121 MMHG | TEMPERATURE: 99 F

## 2022-11-23 DIAGNOSIS — J40 BRONCHITIS WITH WHEEZING: Primary | ICD-10-CM

## 2022-11-23 LAB
POCT INFLUENZA A: NEGATIVE
POCT INFLUENZA B: NEGATIVE
SARS-COV-2 RNA RESP QL NAA+PROBE: NOT DETECTED

## 2022-11-23 PROCEDURE — U0002 COVID-19 LAB TEST NON-CDC: HCPCS | Performed by: NURSE PRACTITIONER

## 2022-11-23 PROCEDURE — 87502 INFLUENZA DNA AMP PROBE: CPT | Performed by: NURSE PRACTITIONER

## 2022-11-23 PROCEDURE — 94640 AIRWAY INHALATION TREATMENT: CPT | Performed by: NURSE PRACTITIONER

## 2022-11-23 PROCEDURE — 99213 OFFICE O/P EST LOW 20 MIN: CPT | Performed by: NURSE PRACTITIONER

## 2022-11-23 RX ORDER — PREDNISONE 20 MG/1
40 TABLET ORAL DAILY
Qty: 10 TABLET | Refills: 0 | Status: SHIPPED | OUTPATIENT
Start: 2022-11-23 | End: 2022-11-28

## 2022-11-23 RX ORDER — IPRATROPIUM BROMIDE AND ALBUTEROL SULFATE 2.5; .5 MG/3ML; MG/3ML
3 SOLUTION RESPIRATORY (INHALATION) ONCE
Status: COMPLETED | OUTPATIENT
Start: 2022-11-23 | End: 2022-11-23

## 2022-11-23 NOTE — ED INITIAL ASSESSMENT (HPI)
+ Covid end of sept. Pt has been fight URI,  Sinus congestion, post nasal drip w congestion. Past 24 hours, increased SOB. Denies fever. No relief w Nyquil, Motrin & OTC Cold., flu. & Afrin only x 3 days. 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 Son recently + RSV.

## 2022-11-23 NOTE — DISCHARGE INSTRUCTIONS
Take steroids as directed and continue your inhalers. If shortness of breath is worsening go to the emergency room.

## 2022-11-30 ENCOUNTER — APPOINTMENT (OUTPATIENT)
Dept: LAB | Facility: HOSPITAL | Age: 32
End: 2022-11-30
Attending: PREVENTIVE MEDICINE
Payer: COMMERCIAL

## 2022-11-30 DIAGNOSIS — Z23 NEED FOR VACCINATION: Primary | ICD-10-CM

## 2022-11-30 PROCEDURE — 90471 IMMUNIZATION ADMIN: CPT

## 2022-12-01 ENCOUNTER — HOSPITAL ENCOUNTER (OUTPATIENT)
Dept: ULTRASOUND IMAGING | Facility: HOSPITAL | Age: 32
Discharge: HOME | End: 2022-12-01
Attending: ORTHOPAEDIC SURGERY
Payer: COMMERCIAL

## 2022-12-01 DIAGNOSIS — M75.30 CALCIFIC SUPRASPINATUS TENDINITIS: ICD-10-CM

## 2022-12-01 PROCEDURE — 20611 DRAIN/INJ JOINT/BURSA W/US: CPT | Performed by: ORTHOPAEDIC SURGERY

## 2022-12-01 RX ORDER — METHYLPREDNISOLONE ACETATE 80 MG/ML
INJECTION, SUSPENSION INTRA-ARTICULAR; INTRALESIONAL; INTRAMUSCULAR; SOFT TISSUE
Status: DISPENSED
Start: 2022-12-01 | End: 2022-12-01

## 2022-12-22 ENCOUNTER — APPOINTMENT (OUTPATIENT)
Dept: GENERAL RADIOLOGY | Age: 32
End: 2022-12-22
Attending: NURSE PRACTITIONER
Payer: COMMERCIAL

## 2022-12-22 ENCOUNTER — HOSPITAL ENCOUNTER (OUTPATIENT)
Age: 32
Discharge: HOME | End: 2022-12-22
Payer: COMMERCIAL

## 2022-12-22 VITALS
RESPIRATION RATE: 20 BRPM | HEART RATE: 101 BPM | WEIGHT: 170 LBS | TEMPERATURE: 99 F | OXYGEN SATURATION: 96 % | BODY MASS INDEX: 28 KG/M2 | DIASTOLIC BLOOD PRESSURE: 80 MMHG | SYSTOLIC BLOOD PRESSURE: 116 MMHG

## 2022-12-22 DIAGNOSIS — L08.9 TOE INFECTION: Primary | ICD-10-CM

## 2022-12-22 PROCEDURE — 99214 OFFICE O/P EST MOD 30 MIN: CPT | Performed by: NURSE PRACTITIONER

## 2022-12-22 PROCEDURE — 73660 X-RAY EXAM OF TOE(S): CPT | Performed by: NURSE PRACTITIONER

## 2022-12-22 RX ORDER — DOXYCYCLINE HYCLATE 100 MG/1
100 CAPSULE ORAL 2 TIMES DAILY
Qty: 14 CAPSULE | Refills: 0 | Status: SHIPPED | OUTPATIENT
Start: 2022-12-22 | End: 2022-12-29

## 2022-12-22 NOTE — DISCHARGE INSTRUCTIONS
Take antibiotic as directed. Follow-up closely with podiatry. Warm soaks every 2 hours for 20 minutes.

## 2022-12-22 NOTE — ED INITIAL ASSESSMENT (HPI)
Had ingrown toe nail left then stubbed same toe, now red & swollen. Concerned about infection. Denies fever or chills.

## 2023-01-19 ENCOUNTER — HOSPITAL ENCOUNTER (OUTPATIENT)
Age: 33
Discharge: HOME | End: 2023-01-19
Payer: COMMERCIAL

## 2023-01-19 VITALS
HEIGHT: 65 IN | HEART RATE: 116 BPM | BODY MASS INDEX: 28.32 KG/M2 | DIASTOLIC BLOOD PRESSURE: 92 MMHG | WEIGHT: 170 LBS | RESPIRATION RATE: 20 BRPM | SYSTOLIC BLOOD PRESSURE: 133 MMHG | OXYGEN SATURATION: 97 % | TEMPERATURE: 99 F

## 2023-01-19 DIAGNOSIS — J32.0 MAXILLARY SINUSITIS, UNSPECIFIED CHRONICITY: Primary | ICD-10-CM

## 2023-01-19 DIAGNOSIS — J02.9 SORE THROAT: ICD-10-CM

## 2023-01-19 LAB — S PYO AG THROAT QL: NEGATIVE

## 2023-01-19 PROCEDURE — 87880 STREP A ASSAY W/OPTIC: CPT | Performed by: NURSE PRACTITIONER

## 2023-01-19 PROCEDURE — 99213 OFFICE O/P EST LOW 20 MIN: CPT | Performed by: NURSE PRACTITIONER

## 2023-01-19 RX ORDER — AMOXICILLIN AND CLAVULANATE POTASSIUM 875; 125 MG/1; MG/1
1 TABLET, FILM COATED ORAL 2 TIMES DAILY
Qty: 20 TABLET | Refills: 0 | Status: SHIPPED | OUTPATIENT
Start: 2023-01-19 | End: 2023-01-29

## 2023-01-19 RX ORDER — AMOXICILLIN AND CLAVULANATE POTASSIUM 875; 125 MG/1; MG/1
1 TABLET, FILM COATED ORAL 2 TIMES DAILY
Qty: 20 TABLET | Refills: 0 | Status: SHIPPED | OUTPATIENT
Start: 2023-01-19 | End: 2023-01-19

## 2023-01-19 RX ORDER — DEXAMETHASONE 4 MG/1
12 TABLET ORAL ONCE
Status: COMPLETED | OUTPATIENT
Start: 2023-01-19 | End: 2023-01-19

## 2023-02-03 ENCOUNTER — LAB ENCOUNTER (OUTPATIENT)
Dept: LAB | Age: 33
End: 2023-02-03
Attending: NURSE PRACTITIONER
Payer: COMMERCIAL

## 2023-02-03 DIAGNOSIS — Z79.899 MEDICATION MANAGEMENT: ICD-10-CM

## 2023-02-03 LAB
ALBUMIN SERPL-MCNC: 4.2 G/DL (ref 3.4–5)
ALBUMIN/GLOB SERPL: 1.3 {RATIO} (ref 1–2)
ALP LIVER SERPL-CCNC: 82 U/L
ALT SERPL-CCNC: 12 U/L
ANION GAP SERPL CALC-SCNC: 5 MMOL/L (ref 0–18)
AST SERPL-CCNC: 11 U/L (ref 15–37)
BASOPHILS # BLD AUTO: 0.05 X10(3) UL (ref 0–0.2)
BASOPHILS NFR BLD AUTO: 0.5 %
BILIRUB SERPL-MCNC: 0.5 MG/DL (ref 0.1–2)
BUN BLD-MCNC: 9 MG/DL (ref 7–18)
CALCIUM BLD-MCNC: 9.2 MG/DL (ref 8.5–10.1)
CHLORIDE SERPL-SCNC: 104 MMOL/L (ref 98–112)
CO2 SERPL-SCNC: 26 MMOL/L (ref 21–32)
CREAT BLD-MCNC: 0.73 MG/DL
EOSINOPHIL # BLD AUTO: 0.22 X10(3) UL (ref 0–0.7)
EOSINOPHIL NFR BLD AUTO: 2.2 %
ERYTHROCYTE [DISTWIDTH] IN BLOOD BY AUTOMATED COUNT: 12.1 %
FASTING STATUS PATIENT QL REPORTED: YES
GFR SERPLBLD BASED ON 1.73 SQ M-ARVRAT: 111 ML/MIN/1.73M2 (ref 60–?)
GLOBULIN PLAS-MCNC: 3.3 G/DL (ref 2.8–4.4)
GLUCOSE BLD-MCNC: 95 MG/DL (ref 70–99)
HCT VFR BLD AUTO: 41.5 %
HGB BLD-MCNC: 13.8 G/DL
IMM GRANULOCYTES # BLD AUTO: 0.04 X10(3) UL (ref 0–1)
IMM GRANULOCYTES NFR BLD: 0.4 %
LYMPHOCYTES # BLD AUTO: 3.1 X10(3) UL (ref 1–4)
LYMPHOCYTES NFR BLD AUTO: 30.7 %
MCH RBC QN AUTO: 28.5 PG (ref 26–34)
MCHC RBC AUTO-ENTMCNC: 33.3 G/DL (ref 31–37)
MCV RBC AUTO: 85.6 FL
MONOCYTES # BLD AUTO: 0.87 X10(3) UL (ref 0.1–1)
MONOCYTES NFR BLD AUTO: 8.6 %
NEUTROPHILS # BLD AUTO: 5.83 X10 (3) UL (ref 1.5–7.7)
NEUTROPHILS # BLD AUTO: 5.83 X10(3) UL (ref 1.5–7.7)
NEUTROPHILS NFR BLD AUTO: 57.6 %
OSMOLALITY SERPL CALC.SUM OF ELEC: 278 MOSM/KG (ref 275–295)
PLATELET # BLD AUTO: 332 10(3)UL (ref 150–450)
POTASSIUM SERPL-SCNC: 4.3 MMOL/L (ref 3.5–5.1)
PROT SERPL-MCNC: 7.5 G/DL (ref 6.4–8.2)
RBC # BLD AUTO: 4.85 X10(6)UL
SODIUM SERPL-SCNC: 135 MMOL/L (ref 136–145)
T4 FREE SERPL-MCNC: 1.1 NG/DL (ref 0.8–1.7)
TSI SER-ACNC: 1.86 MIU/ML (ref 0.36–3.74)
VIT D+METAB SERPL-MCNC: 28 NG/ML (ref 30–100)
WBC # BLD AUTO: 10.1 X10(3) UL (ref 4–11)

## 2023-02-03 PROCEDURE — 82306 VITAMIN D 25 HYDROXY: CPT

## 2023-02-03 PROCEDURE — 84443 ASSAY THYROID STIM HORMONE: CPT

## 2023-02-03 PROCEDURE — 85025 COMPLETE CBC W/AUTO DIFF WBC: CPT

## 2023-02-03 PROCEDURE — 84439 ASSAY OF FREE THYROXINE: CPT

## 2023-02-03 PROCEDURE — 36415 COLL VENOUS BLD VENIPUNCTURE: CPT

## 2023-02-03 PROCEDURE — 80053 COMPREHEN METABOLIC PANEL: CPT

## 2023-02-09 ENCOUNTER — OFFICE VISIT (OUTPATIENT)
Dept: FAMILY MEDICINE CLINIC | Facility: CLINIC | Age: 33
End: 2023-02-09
Payer: COMMERCIAL

## 2023-02-09 VITALS
HEART RATE: 65 BPM | RESPIRATION RATE: 16 BRPM | BODY MASS INDEX: 26.66 KG/M2 | TEMPERATURE: 98 F | OXYGEN SATURATION: 98 % | DIASTOLIC BLOOD PRESSURE: 73 MMHG | HEIGHT: 65 IN | SYSTOLIC BLOOD PRESSURE: 109 MMHG | WEIGHT: 160 LBS

## 2023-02-09 DIAGNOSIS — R05.9 COUGH, UNSPECIFIED TYPE: ICD-10-CM

## 2023-02-09 DIAGNOSIS — R09.82 POST-NASAL DRAINAGE: ICD-10-CM

## 2023-02-09 DIAGNOSIS — J02.9 SORE THROAT: Primary | ICD-10-CM

## 2023-02-09 DIAGNOSIS — J02.9 PHARYNGITIS, UNSPECIFIED ETIOLOGY: ICD-10-CM

## 2023-02-09 LAB
CONTROL LINE PRESENT WITH A CLEAR BACKGROUND (YES/NO): YES YES/NO
STREP GRP A CUL-SCR: NEGATIVE

## 2023-02-09 PROCEDURE — 87081 CULTURE SCREEN ONLY: CPT | Performed by: NURSE PRACTITIONER

## 2023-02-09 PROCEDURE — 3008F BODY MASS INDEX DOCD: CPT | Performed by: NURSE PRACTITIONER

## 2023-02-09 PROCEDURE — 99213 OFFICE O/P EST LOW 20 MIN: CPT | Performed by: NURSE PRACTITIONER

## 2023-02-09 PROCEDURE — 3078F DIAST BP <80 MM HG: CPT | Performed by: NURSE PRACTITIONER

## 2023-02-09 PROCEDURE — 3074F SYST BP LT 130 MM HG: CPT | Performed by: NURSE PRACTITIONER

## 2023-02-09 PROCEDURE — 87880 STREP A ASSAY W/OPTIC: CPT | Performed by: NURSE PRACTITIONER

## 2023-02-10 DIAGNOSIS — J02.0 STREP PHARYNGITIS: Primary | ICD-10-CM

## 2023-02-10 RX ORDER — AMOXICILLIN 500 MG/1
500 CAPSULE ORAL 2 TIMES DAILY
Qty: 20 CAPSULE | Refills: 0 | Status: SHIPPED | OUTPATIENT
Start: 2023-02-10 | End: 2023-02-20

## 2023-02-16 ENCOUNTER — HOSPITAL ENCOUNTER (EMERGENCY)
Facility: HOSPITAL | Age: 33
Discharge: HOME | End: 2023-02-16
Attending: EMERGENCY MEDICINE
Payer: COMMERCIAL

## 2023-02-16 ENCOUNTER — APPOINTMENT (OUTPATIENT)
Dept: CT IMAGING | Facility: HOSPITAL | Age: 33
End: 2023-02-16
Attending: EMERGENCY MEDICINE
Payer: COMMERCIAL

## 2023-02-16 ENCOUNTER — HOSPITAL ENCOUNTER (OUTPATIENT)
Age: 33
Discharge: ANOTHER HOSPITAL | End: 2023-02-16
Payer: COMMERCIAL

## 2023-02-16 VITALS
BODY MASS INDEX: 26.33 KG/M2 | RESPIRATION RATE: 17 BRPM | DIASTOLIC BLOOD PRESSURE: 86 MMHG | HEIGHT: 65 IN | WEIGHT: 158 LBS | TEMPERATURE: 99 F | SYSTOLIC BLOOD PRESSURE: 119 MMHG | OXYGEN SATURATION: 96 % | HEART RATE: 96 BPM

## 2023-02-16 VITALS
HEART RATE: 108 BPM | SYSTOLIC BLOOD PRESSURE: 125 MMHG | DIASTOLIC BLOOD PRESSURE: 92 MMHG | RESPIRATION RATE: 20 BRPM | OXYGEN SATURATION: 97 % | TEMPERATURE: 98 F

## 2023-02-16 DIAGNOSIS — J03.80 ACUTE BACTERIAL TONSILLITIS: Primary | ICD-10-CM

## 2023-02-16 DIAGNOSIS — J36 TONSILLAR ABSCESS: ICD-10-CM

## 2023-02-16 DIAGNOSIS — R59.0 LAD (LYMPHADENOPATHY) OF RIGHT CERVICAL REGION: Primary | ICD-10-CM

## 2023-02-16 DIAGNOSIS — B96.89 ACUTE BACTERIAL TONSILLITIS: Primary | ICD-10-CM

## 2023-02-16 LAB
ALBUMIN SERPL-MCNC: 3.7 G/DL (ref 3.4–5)
ALBUMIN/GLOB SERPL: 0.9 {RATIO} (ref 1–2)
ALP LIVER SERPL-CCNC: 116 U/L
ALT SERPL-CCNC: 14 U/L
ANION GAP SERPL CALC-SCNC: 6 MMOL/L (ref 0–18)
AST SERPL-CCNC: 11 U/L (ref 15–37)
BASOPHILS # BLD AUTO: 0.05 X10(3) UL (ref 0–0.2)
BASOPHILS NFR BLD AUTO: 0.5 %
BILIRUB SERPL-MCNC: 0.3 MG/DL (ref 0.1–2)
BUN BLD-MCNC: 8 MG/DL (ref 7–18)
CALCIUM BLD-MCNC: 9.2 MG/DL (ref 8.5–10.1)
CHLORIDE SERPL-SCNC: 104 MMOL/L (ref 98–112)
CO2 SERPL-SCNC: 29 MMOL/L (ref 21–32)
CREAT BLD-MCNC: 0.59 MG/DL
EOSINOPHIL # BLD AUTO: 0.15 X10(3) UL (ref 0–0.7)
EOSINOPHIL NFR BLD AUTO: 1.4 %
ERYTHROCYTE [DISTWIDTH] IN BLOOD BY AUTOMATED COUNT: 11.7 %
GFR SERPLBLD BASED ON 1.73 SQ M-ARVRAT: 121 ML/MIN/1.73M2 (ref 60–?)
GLOBULIN PLAS-MCNC: 4 G/DL (ref 2.8–4.4)
GLUCOSE BLD-MCNC: 94 MG/DL (ref 70–99)
HCT VFR BLD AUTO: 41.5 %
HGB BLD-MCNC: 13.6 G/DL
IMM GRANULOCYTES # BLD AUTO: 0.08 X10(3) UL (ref 0–1)
IMM GRANULOCYTES NFR BLD: 0.8 %
LYMPHOCYTES # BLD AUTO: 2.26 X10(3) UL (ref 1–4)
LYMPHOCYTES NFR BLD AUTO: 21.8 %
MCH RBC QN AUTO: 28.9 PG (ref 26–34)
MCHC RBC AUTO-ENTMCNC: 32.8 G/DL (ref 31–37)
MCV RBC AUTO: 88.1 FL
MONOCYTES # BLD AUTO: 0.89 X10(3) UL (ref 0.1–1)
MONOCYTES NFR BLD AUTO: 8.6 %
NEUTROPHILS # BLD AUTO: 6.95 X10 (3) UL (ref 1.5–7.7)
NEUTROPHILS # BLD AUTO: 6.95 X10(3) UL (ref 1.5–7.7)
NEUTROPHILS NFR BLD AUTO: 66.9 %
OSMOLALITY SERPL CALC.SUM OF ELEC: 286 MOSM/KG (ref 275–295)
PLATELET # BLD AUTO: 278 10(3)UL (ref 150–450)
POCT MONO: NEGATIVE
POTASSIUM SERPL-SCNC: 4.2 MMOL/L (ref 3.5–5.1)
PROT SERPL-MCNC: 7.7 G/DL (ref 6.4–8.2)
RBC # BLD AUTO: 4.71 X10(6)UL
SODIUM SERPL-SCNC: 139 MMOL/L (ref 136–145)
WBC # BLD AUTO: 10.4 X10(3) UL (ref 4–11)

## 2023-02-16 PROCEDURE — S0077 INJECTION, CLINDAMYCIN PHOSP: HCPCS | Performed by: EMERGENCY MEDICINE

## 2023-02-16 PROCEDURE — 99215 OFFICE O/P EST HI 40 MIN: CPT | Performed by: NURSE PRACTITIONER

## 2023-02-16 PROCEDURE — 99285 EMERGENCY DEPT VISIT HI MDM: CPT

## 2023-02-16 PROCEDURE — 70491 CT SOFT TISSUE NECK W/DYE: CPT | Performed by: EMERGENCY MEDICINE

## 2023-02-16 PROCEDURE — 99284 EMERGENCY DEPT VISIT MOD MDM: CPT

## 2023-02-16 PROCEDURE — 96361 HYDRATE IV INFUSION ADD-ON: CPT

## 2023-02-16 PROCEDURE — 86308 HETEROPHILE ANTIBODY SCREEN: CPT | Performed by: NURSE PRACTITIONER

## 2023-02-16 PROCEDURE — 96365 THER/PROPH/DIAG IV INF INIT: CPT

## 2023-02-16 PROCEDURE — 80053 COMPREHEN METABOLIC PANEL: CPT | Performed by: EMERGENCY MEDICINE

## 2023-02-16 PROCEDURE — 85025 COMPLETE CBC W/AUTO DIFF WBC: CPT | Performed by: EMERGENCY MEDICINE

## 2023-02-16 PROCEDURE — 96375 TX/PRO/DX INJ NEW DRUG ADDON: CPT

## 2023-02-16 RX ORDER — PREDNISONE 20 MG/1
40 TABLET ORAL DAILY
Qty: 6 TABLET | Refills: 0 | Status: SHIPPED | OUTPATIENT
Start: 2023-02-16 | End: 2023-02-19

## 2023-02-16 RX ORDER — DEXAMETHASONE SODIUM PHOSPHATE 10 MG/ML
10 INJECTION, SOLUTION INTRAMUSCULAR; INTRAVENOUS ONCE
Status: COMPLETED | OUTPATIENT
Start: 2023-02-16 | End: 2023-02-16

## 2023-02-16 RX ORDER — CLINDAMYCIN HYDROCHLORIDE 300 MG/1
300 CAPSULE ORAL 4 TIMES DAILY
Qty: 28 CAPSULE | Refills: 0 | Status: SHIPPED | OUTPATIENT
Start: 2023-02-16 | End: 2023-02-23

## 2023-02-16 RX ORDER — KETOROLAC TROMETHAMINE 30 MG/ML
30 INJECTION, SOLUTION INTRAMUSCULAR; INTRAVENOUS ONCE
Status: COMPLETED | OUTPATIENT
Start: 2023-02-16 | End: 2023-02-16

## 2023-02-16 RX ORDER — CLINDAMYCIN PHOSPHATE 600 MG/50ML
600 INJECTION INTRAVENOUS ONCE
Status: COMPLETED | OUTPATIENT
Start: 2023-02-16 | End: 2023-02-16

## 2023-02-16 NOTE — ED INITIAL ASSESSMENT (HPI)
PT here due to a Potential Tonsilar abscess. Pt has been dealing with strep throat and is taking antibiotics.

## 2023-02-27 ENCOUNTER — OFFICE VISIT (OUTPATIENT)
Dept: ORTHOPEDICS CLINIC | Facility: CLINIC | Age: 33
End: 2023-02-27
Payer: COMMERCIAL

## 2023-02-27 DIAGNOSIS — S46.001A INJURY OF RIGHT ROTATOR CUFF, INITIAL ENCOUNTER: ICD-10-CM

## 2023-02-27 DIAGNOSIS — M25.512 ACUTE PAIN OF LEFT SHOULDER: Primary | ICD-10-CM

## 2023-02-27 PROCEDURE — 99215 OFFICE O/P EST HI 40 MIN: CPT | Performed by: ORTHOPAEDIC SURGERY

## 2023-02-28 ENCOUNTER — TELEPHONE (OUTPATIENT)
Dept: ORTHOPEDICS CLINIC | Facility: CLINIC | Age: 33
End: 2023-02-28

## 2023-02-28 RX ORDER — MELOXICAM 15 MG/1
15 TABLET ORAL DAILY
Qty: 14 TABLET | Refills: 1 | Status: SHIPPED | OUTPATIENT
Start: 2023-02-28 | End: 2023-03-02

## 2023-02-28 NOTE — TELEPHONE ENCOUNTER
Spoke with pt, advised of new rx sent, instructions for use and not to take with the OTC NSAIDs. Pt states has MRI scheduled. Araseli Ledezma MD  [facility] Clinical Pool 4 minutes ago (2:16 PM)     I sent over Meloxicam, a prescription strength anti-inflammatory medication. Thanks!

## 2023-02-28 NOTE — TELEPHONE ENCOUNTER
LOV yesterday 2/28/23, left shoulder. Spoke with pt. Pt states didn't bring up pain yesterday because she \"didn't want to go down opioid route\" but is wondering if there is another option. Has been taking ibuprofen 400,h every 4-6 hours roughly (sometimes less during day.) Pain is worse at night, cannot get comfortable so keeps waking. Bilat shoulder pain, R > L. Asking if there is a different medication she can take. Noted: MRI ordered, not yet scheduled (unless going to outside facility, will need to check.)    Taking ibuprofen, no improvement. Reminded can ice, thought this may not help her when trying to sleep. Has not tried any OTC topicals since \"feels deep\" and may not last while sleeping. Any other medication she can try? Did not recommend Tylenol given alk phos, will defer to provider.     Component      Latest Ref Rng & Units 2/16/2023   Glucose      70 - 99 mg/dL 94   Sodium      136 - 145 mmol/L 139   Potassium      3.5 - 5.1 mmol/L 4.2   Chloride      98 - 112 mmol/L 104   Carbon Dioxide, Total      21.0 - 32.0 mmol/L 29.0   ANION GAP      0 - 18 mmol/L 6   BUN      7 - 18 mg/dL 8   CREATININE      0.55 - 1.02 mg/dL 0.59   CALCIUM      8.5 - 10.1 mg/dL 9.2   CALCULATED OSMOLALITY      275 - 295 mOsm/kg 286   eGFR-Cr      >=60 mL/min/1.73m2 121   AST (SGOT)      15 - 37 U/L 11 (L)   ALT (SGPT)      13 - 56 U/L 14   ALKALINE PHOSPHATASE      37 - 98 U/L 116 (H)   Total Bilirubin      0.1 - 2.0 mg/dL 0.3   PROTEIN, TOTAL      6.4 - 8.2 g/dL 7.7   Albumin      3.4 - 5.0 g/dL 3.7   Globulin      2.8 - 4.4 g/dL 4.0   A/G Ratio      1.0 - 2.0 0.9 (L)

## 2023-03-02 ENCOUNTER — TELEPHONE (OUTPATIENT)
Dept: ORTHOPEDICS CLINIC | Facility: CLINIC | Age: 33
End: 2023-03-02

## 2023-03-02 RX ORDER — KETOROLAC TROMETHAMINE 10 MG/1
10 TABLET, FILM COATED ORAL EVERY 6 HOURS PRN
Qty: 25 TABLET | Refills: 0 | Status: SHIPPED | OUTPATIENT
Start: 2023-03-02

## 2023-03-10 ENCOUNTER — OFFICE VISIT (OUTPATIENT)
Dept: ORTHOPEDICS CLINIC | Facility: CLINIC | Age: 33
End: 2023-03-10
Payer: COMMERCIAL

## 2023-03-10 DIAGNOSIS — M75.30 CALCIFIC SUPRASPINATUS TENDINITIS: Primary | ICD-10-CM

## 2023-03-10 PROCEDURE — 87086 URINE CULTURE/COLONY COUNT: CPT

## 2023-03-10 PROCEDURE — 87077 CULTURE AEROBIC IDENTIFY: CPT

## 2023-03-10 PROCEDURE — 87186 SC STD MICRODIL/AGAR DIL: CPT

## 2023-03-10 RX ORDER — TRIAMCINOLONE ACETONIDE 40 MG/ML
40 INJECTION, SUSPENSION INTRA-ARTICULAR; INTRAMUSCULAR ONCE
Status: COMPLETED | OUTPATIENT
Start: 2023-03-10 | End: 2023-03-10

## 2023-03-10 RX ORDER — KETOROLAC TROMETHAMINE 30 MG/ML
30 INJECTION, SOLUTION INTRAMUSCULAR; INTRAVENOUS ONCE
Status: COMPLETED | OUTPATIENT
Start: 2023-03-10 | End: 2023-03-10

## 2023-03-10 RX ADMIN — KETOROLAC TROMETHAMINE 30 MG: 30 INJECTION, SOLUTION INTRAMUSCULAR; INTRAVENOUS at 11:50:00

## 2023-03-10 RX ADMIN — TRIAMCINOLONE ACETONIDE 40 MG: 40 INJECTION, SUSPENSION INTRA-ARTICULAR; INTRAMUSCULAR at 11:52:00

## 2023-03-10 NOTE — PROCEDURES
Right Shoulder Glenohumeral Joint Injection    Name: Ramirez Art   MRN: [id number]  Date: 3/10/2023     Clinical Indications:   Adhesive Capsulitis. After informed consent, the injection site was marked, sterilized with topical chlorhexidine antiseptic, and locally anesthetized with skin refrigerant. The patient was seated upright and the shoulder was exposed. Using sterile technique: 1 mL of 30mg/mL of Ketorolac, 2 mL of 0.5% Bupivicaine, 2 mL of 1% Lidocaine, and 1 mg of 40mg/mL of Triamcinolone (Kenalog) was injected with a Anterior approach utilizing a 22 gauge needle. A band-aid was applied. The patient tolerated the procedure well. Valeria Swain. Elodia Block MD  Knee, Shoulder, & Elbow Surgery / Sports Medicine Specialist  THE [facility] Orthopaedic Surgery  [place] [address], [facility], [address] [place] [address]. [email or web address]  t: [phone]  o: [phone]  f: [phone]

## 2023-03-16 PROCEDURE — 87086 URINE CULTURE/COLONY COUNT: CPT | Performed by: OBSTETRICS & GYNECOLOGY

## 2023-03-16 PROCEDURE — 87624 HPV HI-RISK TYP POOLED RSLT: CPT | Performed by: OBSTETRICS & GYNECOLOGY

## 2023-03-22 ENCOUNTER — TELEPHONE (OUTPATIENT)
Dept: ORTHOPEDICS CLINIC | Facility: CLINIC | Age: 33
End: 2023-03-22

## 2023-03-22 DIAGNOSIS — S46.001A INJURY OF RIGHT ROTATOR CUFF, INITIAL ENCOUNTER: Primary | ICD-10-CM

## 2023-03-22 NOTE — TELEPHONE ENCOUNTER
Patient called requesting a physical therapy order for her shoulder. Please place Rx and let patient know of order. Thank you.

## 2023-03-23 NOTE — TELEPHONE ENCOUNTER
Spoke w/patient to advise that PT order placed, number given to schedule, and locations/number sent to pt via [facility] as well.

## 2023-03-23 NOTE — TELEPHONE ENCOUNTER
LOV: 3/10/23  PT order Pending  Per Last visit note:   \" I recommended intra-articular glenohumeral injection with corticosteroid and ketorolac coupled with physical therapy to aid in strengthening, range of motion, functional improvement, and return to baseline activity\". \"Proceed with home exercise therapy for 8-10 weeks. If symptoms do not resolve, plan for follow-up to discussion additional management plan\".

## 2023-03-24 PROCEDURE — 87086 URINE CULTURE/COLONY COUNT: CPT | Performed by: OBSTETRICS & GYNECOLOGY

## 2023-04-06 ENCOUNTER — HOSPITAL ENCOUNTER (OUTPATIENT)
Dept: GENERAL RADIOLOGY | Age: 33
Discharge: HOME | End: 2023-04-06
Attending: INTERNAL MEDICINE
Payer: COMMERCIAL

## 2023-04-06 DIAGNOSIS — J40 BRONCHITIS: ICD-10-CM

## 2023-04-06 PROBLEM — J30.2 SEASONAL ALLERGIES: Status: ACTIVE | Noted: 2023-04-06

## 2023-04-06 PROBLEM — J34.2 DEVIATED SEPTUM: Status: ACTIVE | Noted: 2023-04-06

## 2023-04-06 PROCEDURE — 71046 X-RAY EXAM CHEST 2 VIEWS: CPT | Performed by: INTERNAL MEDICINE

## 2023-04-24 ENCOUNTER — HOSPITAL ENCOUNTER (OUTPATIENT)
Age: 33
Discharge: HOME | End: 2023-04-24
Payer: COMMERCIAL

## 2023-04-24 VITALS
SYSTOLIC BLOOD PRESSURE: 115 MMHG | OXYGEN SATURATION: 99 % | RESPIRATION RATE: 22 BRPM | BODY MASS INDEX: 24.16 KG/M2 | WEIGHT: 145 LBS | DIASTOLIC BLOOD PRESSURE: 76 MMHG | HEART RATE: 105 BPM | TEMPERATURE: 97 F | HEIGHT: 65 IN

## 2023-04-24 DIAGNOSIS — J02.9 SORE THROAT: ICD-10-CM

## 2023-04-24 DIAGNOSIS — J02.0 STREPTOCOCCAL SORE THROAT: Primary | ICD-10-CM

## 2023-04-24 LAB
S PYO AG THROAT QL: POSITIVE
SARS-COV-2 RNA RESP QL NAA+PROBE: NOT DETECTED

## 2023-04-24 PROCEDURE — 87880 STREP A ASSAY W/OPTIC: CPT | Performed by: NURSE PRACTITIONER

## 2023-04-24 PROCEDURE — U0002 COVID-19 LAB TEST NON-CDC: HCPCS | Performed by: NURSE PRACTITIONER

## 2023-04-24 PROCEDURE — 99213 OFFICE O/P EST LOW 20 MIN: CPT | Performed by: NURSE PRACTITIONER

## 2023-04-24 RX ORDER — AMOXICILLIN AND CLAVULANATE POTASSIUM 875; 125 MG/1; MG/1
1 TABLET, FILM COATED ORAL 2 TIMES DAILY
Qty: 14 TABLET | Refills: 0 | Status: SHIPPED | OUTPATIENT
Start: 2023-04-24 | End: 2023-05-01

## 2023-04-24 NOTE — ED INITIAL ASSESSMENT (HPI)
PT C/O SORE THROAT , SWOLLEN TONSILS AND SWOLLEN LYMPH NODES. PT STATES SYMPTOMS STARTED LAST NIGHT. PT DENIES FEVER.

## 2023-04-24 NOTE — DISCHARGE INSTRUCTIONS
Increase water intake 2-3 liters daily   Replace her toothbrush 48 hours after starting antibiotic. Take a daily probiotic such as Culturelle as you have been on several antibiotics for bacterial infections in the past 8 weeks. Please follow-up with your primary care provider within 1 week to ensure you do not have antibiotic failure.

## 2023-05-04 ENCOUNTER — HOSPITAL ENCOUNTER (OUTPATIENT)
Age: 33
Discharge: HOME | End: 2023-05-04
Payer: COMMERCIAL

## 2023-05-04 VITALS
TEMPERATURE: 100 F | HEART RATE: 112 BPM | RESPIRATION RATE: 16 BRPM | OXYGEN SATURATION: 96 % | SYSTOLIC BLOOD PRESSURE: 106 MMHG | DIASTOLIC BLOOD PRESSURE: 66 MMHG

## 2023-05-04 DIAGNOSIS — J02.0 STREPTOCOCCAL SORE THROAT: Primary | ICD-10-CM

## 2023-05-04 LAB — S PYO AG THROAT QL: POSITIVE

## 2023-05-04 PROCEDURE — 96372 THER/PROPH/DIAG INJ SC/IM: CPT | Performed by: NURSE PRACTITIONER

## 2023-05-04 PROCEDURE — 99213 OFFICE O/P EST LOW 20 MIN: CPT | Performed by: NURSE PRACTITIONER

## 2023-05-04 PROCEDURE — 87880 STREP A ASSAY W/OPTIC: CPT | Performed by: NURSE PRACTITIONER

## 2023-05-04 RX ORDER — DEXAMETHASONE 4 MG/1
12 TABLET ORAL ONCE
Status: COMPLETED | OUTPATIENT
Start: 2023-05-04 | End: 2023-05-04

## 2023-05-04 NOTE — ED INITIAL ASSESSMENT (HPI)
Had strep throat last week, sore throat back yesterday. Low grade fevers. Last dose of Augmentin was Monday morning. Has had strep in sept, feb, and April.

## 2023-07-12 ENCOUNTER — HOSPITAL ENCOUNTER (OUTPATIENT)
Age: 33
Discharge: HOME | End: 2023-07-12
Payer: COMMERCIAL

## 2023-07-12 VITALS
RESPIRATION RATE: 18 BRPM | DIASTOLIC BLOOD PRESSURE: 83 MMHG | BODY MASS INDEX: 22 KG/M2 | OXYGEN SATURATION: 95 % | HEART RATE: 104 BPM | TEMPERATURE: 98 F | WEIGHT: 135 LBS | SYSTOLIC BLOOD PRESSURE: 111 MMHG

## 2023-07-12 DIAGNOSIS — J01.10 ACUTE NON-RECURRENT FRONTAL SINUSITIS: Primary | ICD-10-CM

## 2023-07-12 PROCEDURE — 99213 OFFICE O/P EST LOW 20 MIN: CPT | Performed by: PHYSICIAN ASSISTANT

## 2023-07-12 RX ORDER — AMOXICILLIN AND CLAVULANATE POTASSIUM 875; 125 MG/1; MG/1
1 TABLET, FILM COATED ORAL 2 TIMES DAILY
Qty: 20 TABLET | Refills: 0 | Status: SHIPPED | OUTPATIENT
Start: 2023-07-12 | End: 2023-07-22

## 2023-07-12 NOTE — DISCHARGE INSTRUCTIONS
Continue to take ibuprofen and Flonase  Take augmentin twice a day until gone  Follow up with primary care doctor and ENT  Return to the ER if symptoms worsen

## 2023-07-12 NOTE — ED INITIAL ASSESSMENT (HPI)
Coughing x 10 days, Sinus & ear pressure, Post nasal drip. Denies fever. No relief w sudafed & motrin.

## 2023-10-18 ENCOUNTER — LAB ENCOUNTER (OUTPATIENT)
Dept: LAB | Age: 33
End: 2023-10-18
Attending: INTERNAL MEDICINE
Payer: COMMERCIAL

## 2023-10-18 DIAGNOSIS — Z79.01 LONG TERM (CURRENT) USE OF ANTICOAGULANTS: ICD-10-CM

## 2023-10-18 DIAGNOSIS — Z00.00 ROUTINE GENERAL MEDICAL EXAMINATION AT A HEALTH CARE FACILITY: ICD-10-CM

## 2023-10-18 LAB
INR BLD: 0.93 (ref 0.8–1.2)
PROTHROMBIN TIME: 13 SECONDS (ref 11.6–14.8)

## 2023-10-18 PROCEDURE — 36415 COLL VENOUS BLD VENIPUNCTURE: CPT

## 2023-10-18 PROCEDURE — 85610 PROTHROMBIN TIME: CPT

## 2023-11-20 ENCOUNTER — OFFICE VISIT (OUTPATIENT)
Dept: ORTHOPEDICS CLINIC | Facility: CLINIC | Age: 33
End: 2023-11-20
Payer: COMMERCIAL

## 2023-11-20 VITALS — WEIGHT: 123 LBS | HEIGHT: 65 IN | BODY MASS INDEX: 20.49 KG/M2

## 2023-11-20 DIAGNOSIS — S46.001A INJURY OF RIGHT ROTATOR CUFF, INITIAL ENCOUNTER: Primary | ICD-10-CM

## 2023-11-20 DIAGNOSIS — M75.30 CALCIFIC SUPRASPINATUS TENDINITIS: ICD-10-CM

## 2023-11-20 PROCEDURE — 3008F BODY MASS INDEX DOCD: CPT | Performed by: ORTHOPAEDIC SURGERY

## 2023-11-20 PROCEDURE — 20610 DRAIN/INJ JOINT/BURSA W/O US: CPT | Performed by: ORTHOPAEDIC SURGERY

## 2023-11-20 PROCEDURE — 99214 OFFICE O/P EST MOD 30 MIN: CPT | Performed by: ORTHOPAEDIC SURGERY

## 2023-11-20 RX ORDER — KETOROLAC TROMETHAMINE 30 MG/ML
30 INJECTION, SOLUTION INTRAMUSCULAR; INTRAVENOUS ONCE
Status: COMPLETED | OUTPATIENT
Start: 2023-11-20 | End: 2023-11-20

## 2023-11-20 RX ORDER — TRIAMCINOLONE ACETONIDE 40 MG/ML
40 INJECTION, SUSPENSION INTRA-ARTICULAR; INTRAMUSCULAR ONCE
Status: COMPLETED | OUTPATIENT
Start: 2023-11-20 | End: 2023-11-20

## 2023-11-20 RX ADMIN — KETOROLAC TROMETHAMINE 30 MG: 30 INJECTION, SOLUTION INTRAMUSCULAR; INTRAVENOUS at 11:15:00

## 2023-11-20 RX ADMIN — TRIAMCINOLONE ACETONIDE 40 MG: 40 INJECTION, SUSPENSION INTRA-ARTICULAR; INTRAMUSCULAR at 11:15:00

## 2023-11-20 NOTE — PROCEDURES
Right Shoulder Glenohumeral Joint Injection    Name: Zehra Wright   MRN: [id number]  Date: 11/20/2023     Clinical Indications:   Persistent Shoulder pain refractory to conservative measures. After informed consent, the injection site was marked, sterilized with topical chlorhexidine antiseptic, and locally anesthetized with skin refrigerant. The patient was seated upright and the shoulder was exposed. Using sterile technique: 1 mL of 30mg/mL of Ketorolac, 2 mL of 0.5% Bupivicaine, 2 mL of 1% Lidocaine, and 1 mg of 40mg/mL of Triamcinolone (Kenalog) was injected with a Anterior approach utilizing a 22 gauge needle. A band-aid was applied. The patient tolerated the procedure well. Jesse Coto. Nataliya Vyas MD  Knee, Shoulder, & Elbow Surgery / Sports Medicine Specialist  THE [facility] Orthopaedic Surgery  [place] [address] [place] [address] [place] [address]. [email or web address]  t: [phone]  o: [phone]  f: [phone]

## 2023-12-06 ENCOUNTER — TELEPHONE (OUTPATIENT)
Age: 33
End: 2023-12-06

## 2023-12-06 NOTE — TELEPHONE ENCOUNTER
New Consult  De Ana MIR  : 1990  Ph:[phone]   Wants to see hem / Onc in [place]  Dx: bruising   Thanks Catalina

## 2023-12-29 ENCOUNTER — LAB ENCOUNTER (OUTPATIENT)
Dept: LAB | Age: 33
End: 2023-12-29
Attending: NURSE PRACTITIONER
Payer: COMMERCIAL

## 2023-12-29 DIAGNOSIS — Z79.899 MEDICATION MANAGEMENT: ICD-10-CM

## 2023-12-29 LAB
DEPRECATED HBV CORE AB SER IA-ACNC: 9.1 NG/ML
FOLATE SERPL-MCNC: 12.8 NG/ML (ref 8.7–?)
IRON SATN MFR SERPL: 9 %
IRON SERPL-MCNC: 32 UG/DL
TIBC SERPL-MCNC: 364 UG/DL (ref 240–450)
TRANSFERRIN SERPL-MCNC: 244 MG/DL (ref 200–360)
VIT B12 SERPL-MCNC: 214 PG/ML (ref 193–986)
VIT D+METAB SERPL-MCNC: 34.9 NG/ML (ref 30–100)

## 2023-12-29 PROCEDURE — 36415 COLL VENOUS BLD VENIPUNCTURE: CPT

## 2023-12-29 PROCEDURE — 83540 ASSAY OF IRON: CPT

## 2023-12-29 PROCEDURE — 82728 ASSAY OF FERRITIN: CPT

## 2023-12-29 PROCEDURE — 82607 VITAMIN B-12: CPT

## 2023-12-29 PROCEDURE — 83550 IRON BINDING TEST: CPT

## 2023-12-29 PROCEDURE — 82306 VITAMIN D 25 HYDROXY: CPT

## 2023-12-29 PROCEDURE — 82746 ASSAY OF FOLIC ACID SERUM: CPT

## 2024-01-01 ENCOUNTER — OFFICE VISIT (OUTPATIENT)
Dept: FAMILY MEDICINE CLINIC | Facility: CLINIC | Age: 34
End: 2024-01-01
Payer: COMMERCIAL

## 2024-01-01 VITALS
HEART RATE: 89 BPM | SYSTOLIC BLOOD PRESSURE: 104 MMHG | HEIGHT: 65 IN | TEMPERATURE: 98 F | BODY MASS INDEX: 20.83 KG/M2 | RESPIRATION RATE: 20 BRPM | DIASTOLIC BLOOD PRESSURE: 66 MMHG | WEIGHT: 125 LBS | OXYGEN SATURATION: 97 %

## 2024-01-01 DIAGNOSIS — J11.1 INFLUENZA-LIKE ILLNESS: ICD-10-CM

## 2024-01-01 DIAGNOSIS — J02.9 SORE THROAT: Primary | ICD-10-CM

## 2024-01-01 DIAGNOSIS — J06.9 VIRAL URI WITH COUGH: ICD-10-CM

## 2024-01-01 LAB
CONTROL LINE PRESENT WITH A CLEAR BACKGROUND (YES/NO): YES YES/NO
KIT EXPIRATION DATE: NORMAL DATE
KIT LOT #: NORMAL NUMERIC
STREP GRP A CUL-SCR: NEGATIVE

## 2024-01-01 PROCEDURE — 87081 CULTURE SCREEN ONLY: CPT | Performed by: NURSE PRACTITIONER

## 2024-01-01 RX ORDER — BENZONATATE 200 MG/1
200 CAPSULE ORAL 3 TIMES DAILY PRN
Qty: 30 CAPSULE | Refills: 0 | Status: SHIPPED | OUTPATIENT
Start: 2024-01-01

## 2024-01-16 ENCOUNTER — TELEPHONE (OUTPATIENT)
Dept: HEMATOLOGY/ONCOLOGY | Facility: HOSPITAL | Age: 34
End: 2024-01-16
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
## 2024-01-17 ENCOUNTER — TELEMEDICINE (OUTPATIENT)
Dept: HEMATOLOGY/ONCOLOGY | Facility: HOSPITAL | Age: 34
End: 2024-01-17
Attending: INTERNAL MEDICINE
Payer: COMMERCIAL

## 2024-01-17 DIAGNOSIS — E61.1 IRON DEFICIENCY: ICD-10-CM

## 2024-01-17 DIAGNOSIS — E53.8 LOW SERUM VITAMIN B12: ICD-10-CM

## 2024-01-17 DIAGNOSIS — T14.8XXA BRUISING: Primary | ICD-10-CM

## 2024-01-17 PROCEDURE — 99214 OFFICE O/P EST MOD 30 MIN: CPT | Performed by: INTERNAL MEDICINE

## 2024-01-17 NOTE — CONSULTS
Virtual/Telephone Check-In    Lisbeth Bowens verbally consents to a Virtual/Telephone Check-In service on 01/17/24.  Patient has been referred to the [facility] website at [email or web address] to review the yearly Consent to Treat document.  Patient understands and accepts financial responsibility for any deductible, co-insurance and/or co-pays associated with this service.      Cancer Center Report of Consultation    Patient Name: Lisbeth Bowens   YOB: 1990   Medical Record Number: [id number]   CSN: [id number]   Consulting Physician: Luis Fragoso MD  Referring Physician(s): Marcin Clifton MD    Date of Consultation: 1/17/2024     Reason for Consultation:  Lisbeth Bowens was seen today in the Cancer Center for the diagnosis of   Easy bruising    History of Present Illness:     33 year old that is being seen today for bruising that has been going on for a few months, predominantly started summer.  No trauma.  Her PCP.  Coags which were normal.  She denies easy bleeding or the menses have been a little irregular and heavier.  She is currently on fluoxetine but has been on that for several years.  No changes in medications.  No herbal supplements.    50 pound weight loss over the last year due to grief after losing her father and starting Vyvanse.    Past Medical History:  Past Medical History:   Diagnosis Date    Acute otitis media 3/31/2013    Anal fissure 3/21/2014    Anxiety attack 9/8/09    Attention deficit disorder with hyperactivity(314.01) 2/2011    Attention deficit disorder without mention of hyperactivity 9/2010    Headache(784.0) 02/1999    Hemorrhoids, internal, with bleeding 2/13/2014    Knee pain 7/2011    Major depressive disorder, recurrent episode, moderate (HCC) 12/2011    Midepigastric pain 7/2011    Migraines     Other postprocedural status(V45.89) 9/2010    Overdose 11/11    Pain in joint, lower leg 10/1/2012    Palpitations     Pharyngitis 3/09    recurrent     Rectal pain 3/21/2014    Self-mutilation 2003    Sinusitis 3/29/07    Suicidal ideation 11    Tobacco dependence     Unspecified sinusitis (chronic) 2010    recurrent    URI (upper respiratory infection) 3/31/2013    Visual impairment     GLASSES       Past Surgical History:  Past Surgical History:   Procedure Laterality Date      2020    CHOLECYSTECTOMY  12    by Dr. Holland @ [facility]    COLPOSCOPY, CERVIX W/UPPER ADJACENT VAGINA; W/BIOPSY(S), CERVIX  ,    LEEP  2018    SINUS SURGERY        bilat endo sofia bullosa and middle turbinate  hypertrophy takedown w/right endoscopic maxillary antrostom anterior ethmoidectomy and left endo balloon maxillary antrostomiesbilat endo max sinus lavage and inferior turbinate submucous resection and outfracture.       Family Medical History:  Family History   Problem Relation Age of Onset    Heart Disorder Father     Lipids Father     Pulmonary Disease Father     Hypertension Father     Other (Other) Father     Depression Mother     Other (mother was adopted) Mother     Cancer Maternal Grandmother         kidney cancer    Cancer Paternal Grandmother         uterine cancer    Other (Other) Sister         cerebral palsy       Psychosocial History:  Social History     Socioeconomic History    Marital status:      Spouse name: Not on file    Number of children: Not on file    Years of education: Not on file    Highest education level: Not on file   Occupational History    Not on file   Tobacco Use    Smoking status: Former     Packs/day: .25     Types: Cigarettes    Smokeless tobacco: Never    Tobacco comments:     quit 10 years ago and re-started    Vaping Use    Vaping Use: Never used   Substance and Sexual Activity    Alcohol use: Yes    Drug use: Not Currently     Types: Cannabis     Comment: last use     Sexual activity: Yes     Partners: Male   Other Topics Concern     Service Not Asked    Blood Transfusions Not  Asked    Caffeine Concern Yes     Comment: 1 cup of coffee 3 days per week    Occupational Exposure Not Asked    Hobby Hazards Not Asked    Sleep Concern Not Asked    Stress Concern Not Asked    Weight Concern Not Asked    Special Diet Not Asked    Back Care Not Asked    Exercise Yes     Comment: fitness class weekly    Bike Helmet Not Asked    Seat Belt Not Asked    Self-Exams Not Asked   Social History Narrative    Not on file     Social Determinants of Health     Financial Resource Strain: Not on file   Food Insecurity: Not on file   Transportation Needs: Not on file   Physical Activity: Not on file   Stress: Not on file   Social Connections: Not on file   Housing Stability: Not on file       Allergies:   Allergies   Allergen Reactions    Cefadroxil HIVES     hives  hives    Bactrim [Sulfamethoxazole W/Trimethoprim] JITTERY     Hot, sweaty    Duricef [Cefadroxil Monohydrate]      hives    Levaquin [Levofloxacin] OTHER (SEE COMMENTS)     Severe tendonitis       Current Medications:    Current Outpatient Medications:     Amphetamine-Dextroamphet ER (ADDERALL XR) 15 MG Oral Capsule SR 24 Hr, Take 1 capsule (15 mg total) by mouth daily., Disp: 30 capsule, Rfl: 0    FLUoxetine HCl 40 MG Oral Cap, Take 1 capsule (40 mg total) by mouth every morning., Disp: 90 capsule, Rfl: 0    lamoTRIgine 200 MG Oral Tab, Take 1 tablet (200 mg total) by mouth 2 (two) times daily., Disp: 180 tablet, Rfl: 0    temazepam 30 MG Oral Cap, Take 1 capsule (30 mg total) by mouth nightly as needed for Sleep., Disp: 30 capsule, Rfl: 2    buPROPion ER (WELLBUTRIN XL) 150 MG Oral Tablet 24 Hr, Take 1 tablet (150 mg total) by mouth every morning., Disp: 90 tablet, Rfl: 0    lisdexamfetamine (VYVANSE) 40 MG Oral Cap, Take 1 capsule (40 mg total) by mouth daily., Disp: 30 capsule, Rfl: 0    benzonatate 200 MG Oral Cap, Take 1 capsule (200 mg total) by mouth 3 (three) times daily as needed., Disp: 30 capsule, Rfl: 0    traZODone 50 MG Oral Tab, TAKE  1 TO 2 TABLETS BY MOUTH AS NEEDED FOR SLEEP, Disp: 180 tablet, Rfl: 0    guaiFENesin-codeine (CHERATUSSIN AC) 100-10 MG/5ML Oral Solution, Take 5 mL by mouth every 6 (six) hours as needed for cough. (Patient not taking: Reported on 1/1/2024), Disp: 118 mL, Rfl: 0    Review of Systems:    Constitutional: weight loss.  Eyes: No visual disturbance, irritation and redness.  Ears, nose, mouth, throat, and face: No hearing loss, tinnitus, hoarseness and voice change.  Respiratory: No cough, sputum, hemoptysis, chest pain, wheezing, dyspnea on exertion  Cardiovascular: No chest pain, palpitations, syncope,orthopnea, PND, edema  Gastrointestinal:No dysphagia, odynophagia, nausea, vomiting, diarrhea, abdominal pain.  Derm: No rash, skin lesions, and pruritus.  Hematologic/lymphatic: easy bruising  Musculoskeletal: No myalgias, arthralgias, muscle weakness.  Neurological: No headaches, dizziness, seizures, speech problems, gait problems   Psych: No anxiety/depression.    Vital Signs:  LMP 12/18/2023 (Approximate)     ECOG PS: 0    Physical Examination:    General: Patient is alert and oriented x 3, not in acute distress.  No bruising was present to see.     Labs:         Assessment and Plan:    # Easy bruising: Noted for last few months.  Coags and platelet counts are normal.  Fluoxetine can cause bruising but she has been on this for several years.  No supplements or herbal medications.  Recommend platelet aggregation studies.  Should be off NSAIDs for a week before this.  Check sed rate and cryoglobulins to rule out vasculitis.    # Iron deficiency: Mild.  Likely due to menses.  Recommend OTC iron, once every other day.  B12 is a bit low, should start supplementation.  Will repeat in 3 months.          Procedures    Platelet Aggregation    SED RATE [E]    Cryoglobulin    CBC With Differential With Platelet    Ferritin    Iron And Tibc    Vitamin B12       Julita Fragoso M.D.    [facility]    [facility]  [address] [place] [address] [place], [address]    1/17/2024

## 2024-01-25 ENCOUNTER — APPOINTMENT (OUTPATIENT)
Dept: HEMATOLOGY/ONCOLOGY | Facility: HOSPITAL | Age: 34
End: 2024-01-25
Attending: INTERNAL MEDICINE
Payer: COMMERCIAL

## 2024-02-06 ENCOUNTER — NURSE ONLY (OUTPATIENT)
Dept: HEMATOLOGY/ONCOLOGY | Facility: HOSPITAL | Age: 34
End: 2024-02-06
Attending: INTERNAL MEDICINE
Payer: COMMERCIAL

## 2024-02-06 DIAGNOSIS — T14.8XXA BRUISING: ICD-10-CM

## 2024-02-06 LAB
ERYTHROCYTE [SEDIMENTATION RATE] IN BLOOD: 11 MM/HR
PA AA BLD-ACNC: 72 % (ref 65–90)
PA COLL PRP: 81 % (ref 70–100)
PA RIST LO DOSE PRP: 1 % (ref 0–15)
PA RIST PRP QL: 70 % (ref 70–100)
PLATELET AGGREGATION (ADP): 75 % (ref 65–100)
PLATELET AGGREGATION ADP SLOPE: 54 (ref 38–67)
PLATELET AGGREGATION ARACHIDONIC ACID SLOPE: 36 (ref 20–100)
PLATELET AGGREGATION COLLAGEN SLOPE: 60 (ref 35–67)
PLATELET AGGREGATION RISTOCETIN SLOPE LOW: 0 (ref 0–7)
PLATELET AGGREGATION RISTOCETIN SLOPE: 54 (ref 42–65)

## 2024-02-06 PROCEDURE — 85390 FIBRINOLYSINS SCREEN I&R: CPT

## 2024-02-06 PROCEDURE — 85576 BLOOD PLATELET AGGREGATION: CPT

## 2024-02-06 PROCEDURE — 36415 COLL VENOUS BLD VENIPUNCTURE: CPT

## 2024-02-06 PROCEDURE — 82595 ASSAY OF CRYOGLOBULIN: CPT

## 2024-02-06 PROCEDURE — 85652 RBC SED RATE AUTOMATED: CPT

## 2024-03-19 ENCOUNTER — OFFICE VISIT (OUTPATIENT)
Facility: CLINIC | Age: 34
End: 2024-03-19
Payer: COMMERCIAL

## 2024-03-19 VITALS
HEIGHT: 65 IN | WEIGHT: 121 LBS | SYSTOLIC BLOOD PRESSURE: 124 MMHG | BODY MASS INDEX: 20.16 KG/M2 | DIASTOLIC BLOOD PRESSURE: 82 MMHG

## 2024-03-19 DIAGNOSIS — Z01.419 ENCOUNTER FOR ANNUAL ROUTINE GYNECOLOGICAL EXAMINATION: Primary | ICD-10-CM

## 2024-03-19 DIAGNOSIS — Z11.3 SCREENING FOR STDS (SEXUALLY TRANSMITTED DISEASES): ICD-10-CM

## 2024-03-19 DIAGNOSIS — Z01.419 ENCOUNTER FOR WELL WOMAN EXAM WITH ROUTINE GYNECOLOGICAL EXAM: ICD-10-CM

## 2024-03-19 PROCEDURE — 3008F BODY MASS INDEX DOCD: CPT | Performed by: OBSTETRICS & GYNECOLOGY

## 2024-03-19 PROCEDURE — 87591 N.GONORRHOEAE DNA AMP PROB: CPT | Performed by: OBSTETRICS & GYNECOLOGY

## 2024-03-19 PROCEDURE — 3079F DIAST BP 80-89 MM HG: CPT | Performed by: OBSTETRICS & GYNECOLOGY

## 2024-03-19 PROCEDURE — 87491 CHLMYD TRACH DNA AMP PROBE: CPT | Performed by: OBSTETRICS & GYNECOLOGY

## 2024-03-19 PROCEDURE — 87624 HPV HI-RISK TYP POOLED RSLT: CPT | Performed by: OBSTETRICS & GYNECOLOGY

## 2024-03-19 PROCEDURE — 99395 PREV VISIT EST AGE 18-39: CPT | Performed by: OBSTETRICS & GYNECOLOGY

## 2024-03-19 PROCEDURE — 3074F SYST BP LT 130 MM HG: CPT | Performed by: OBSTETRICS & GYNECOLOGY

## 2024-03-19 NOTE — PROGRESS NOTES
GYN H&P     Genetic questionnaire reviewed with the patient and she will be referred for genetic counseling if the questionnaire had any positive results.    The [facility] intake form was also reviewed regarding contraception, menstrual periods, urinary health, and vaginal / sexual health    3/19/2024  9:49 AM    Chief Complaint   Patient presents with    Physical     IVF pt for Dr. Shay, is requesting pap, gc/ch, breast/pelvic exam results to be sent faxed to Dr. Shay. Presents with no concerns.        HPI: Lisbeth is a 34 year old  Patient's last menstrual period was 2024 (approximate).  (contraception: none) here for her annual gyn exam.     She has no complaints.   Menses are regular. Denies any pelvic or breast complaints.   Pt undergoing IVF, requiring thin prep,GC , chlamydia.  Previous encounters and chart reviewed.     OB History    Para Term  AB Living   1 1 1 0 0 1   SAB IAB Ectopic Multiple Live Births   0 0 0 0 1      # Outcome Date GA Lbr Hugh/2nd Weight Sex Delivery Anes PTL Lv   1 Term 20 38w5d 14:24 / 02:05 7 lb 0.2 oz (3.18 kg) M Caesarean EPI N AVANI      Complications: Variable decelerations       GYN hx:   Menarche: 11  Period Cycle (Days): 25-30  Period Duration (Days): 4-5  Period Flow: normal  Use of Birth Control (if yes, specify type): None  Hx Prior Abnormal Pap: Yes  Pap Date: 21  Pap Result Notes: 2023: neg/neg 21 Neg/Neg; 2020 Neg/Neg; 2019 neg/neg; 2018 ASCUS,pos 16,18/45 neg  (2017 neg,pos 16+, neg)  Follow Up Recommendation: desires pap today      Past Medical History:   Diagnosis Date    Acute otitis media 3/31/2013    Anal fissure 3/21/2014    Anxiety attack 09    Attention deficit disorder with hyperactivity(314.01) 2011    Attention deficit disorder without mention of hyperactivity 2010    Headache(784.0) 1999    Hemorrhoids, internal, with bleeding 2014    Knee pain  2011    Major depressive disorder, recurrent episode, moderate (HCC) 2011    Midepigastric pain 2011    Migraines     Other postprocedural status(V45.89) 2010    Overdose     Pain in joint, lower leg 10/1/2012    Palpitations     Pharyngitis 3/09    recurrent    Rectal pain 3/21/2014    Self-mutilation 2003    Sinusitis 3/29/07    Suicidal ideation 11    Tobacco dependence     Unspecified sinusitis (chronic) 2010    recurrent    URI (upper respiratory infection) 3/31/2013    Visual impairment     GLASSES     Past Surgical History:   Procedure Laterality Date      2020    CHOLECYSTECTOMY  13    by Dr. Holland @ [facility]    COLPOSCOPY, CERVIX W/UPPER ADJACENT VAGINA; W/BIOPSY(S), CERVIX  ,    LEEP  2018    SINUS SURGERY        bilat endo sofia bullosa and middle turbinate  hypertrophy takedown w/right endoscopic maxillary antrostom anterior ethmoidectomy and left endo balloon maxillary antrostomiesbilat endo max sinus lavage and inferior turbinate submucous resection and outfracture.     Allergies   Allergen Reactions    Cefadroxil HIVES     hives  hives    Bactrim [Sulfamethoxazole W/Trimethoprim] JITTERY     Hot, sweaty    Duricef [Cefadroxil Monohydrate]      hives    Levaquin [Levofloxacin] OTHER (SEE COMMENTS)     Severe tendonitis     Current Outpatient Medications on File Prior to Visit   Medication Sig Dispense Refill    ALPRAZolam (XANAX) 0.5 MG Oral Tab Take 1 tablet (0.5 mg total) by mouth 3 (three) times daily as needed. 30 tablet 0    VYVANSE 40 MG Oral Cap Take 1 capsule (40 mg total) by mouth every morning. 30 capsule 0    FLUoxetine HCl 40 MG Oral Cap Take 1 capsule (40 mg total) by mouth every morning. 90 capsule 0    lamoTRIgine 200 MG Oral Tab Take 1 tablet (200 mg total) by mouth 2 (two) times daily. 180 tablet 0    temazepam 30 MG Oral Cap Take 1 capsule (30 mg total) by mouth nightly as needed for Sleep. 30 capsule 2    buPROPion ER (WELLBUTRIN XL)  150 MG Oral Tablet 24 Hr Take 1 tablet (150 mg total) by mouth every morning. 90 tablet 0    traZODone 50 MG Oral Tab TAKE 1 TO 2 TABLETS BY MOUTH AS NEEDED FOR SLEEP 180 tablet 0    benzonatate 200 MG Oral Cap Take 1 capsule (200 mg total) by mouth 3 (three) times daily as needed. 30 capsule 0    guaiFENesin-codeine (CHERATUSSIN AC) 100-10 MG/5ML Oral Solution Take 5 mL by mouth every 6 (six) hours as needed for cough. (Patient not taking: Reported on 1/1/2024) 118 mL 0     No current facility-administered medications on file prior to visit.     Family History   Problem Relation Age of Onset    Heart Disorder Father     Lipids Father     Pulmonary Disease Father     Hypertension Father     Other (Other) Father     Depression Mother     Other (mother was adopted) Mother     Cancer Maternal Grandmother         kidney cancer    Cancer Paternal Grandmother         uterine cancer    Other (Other) Sister         cerebral palsy     Social History     Socioeconomic History    Marital status:      Spouse name: Not on file    Number of children: Not on file    Years of education: Not on file    Highest education level: Not on file   Occupational History    Not on file   Tobacco Use    Smoking status: Former     Packs/day: .25     Types: Cigarettes    Smokeless tobacco: Never    Tobacco comments:     quit 10 years ago and re-started 2/22   Vaping Use    Vaping Use: Never used   Substance and Sexual Activity    Alcohol use: Yes    Drug use: Not Currently     Types: Cannabis     Comment: last use sept 5 ,2021    Sexual activity: Yes     Partners: Male   Other Topics Concern     Service Not Asked    Blood Transfusions Not Asked    Caffeine Concern Yes     Comment: 1 cup of coffee 3 days per week    Occupational Exposure Not Asked    Hobby Hazards Not Asked    Sleep Concern Not Asked    Stress Concern Not Asked    Weight Concern Not Asked    Special Diet Not Asked    Back Care Not Asked    Exercise Yes     Comment:  fitness class weekly    Bike Helmet Not Asked    Seat Belt Not Asked    Self-Exams Not Asked   Social History Narrative    Not on file     Social Determinants of Health     Financial Resource Strain: Not on file   Food Insecurity: Not on file   Transportation Needs: Not on file   Physical Activity: Not on file   Stress: Not on file   Social Connections: Not on file   Housing Stability: Not on file       ROS:     Review of Systems:  General: denies fevers, chills, fatigue and malaise.   Eyes: no visual changes, denies headaches  ENT: no complaints, denies earaches, runny nose, epistaxis, throat pain or sore throat  Respiratory: denies SOB, dyspnea, cough or wheezing  Cardiovascular: denies chest pain, palpitations, exercise intolerance   GI: denies abdominal pain, diarrhea, constipation  : no complaints, denies dysuria, increased urinary frequency. Menses regular, no dysmenorrhea, no menorrhagia, no dyspareunia   Hematological/lymphatic: denies history of excessive bleeding or bruising, denies dizziness, lightheadedness.   Breast: denies rashes, skin changes, pain, lumps or discharge   Psychiatric: denies depression, changes in sleep patterns, anxiety  Endocrine: denies hot or cold intolerance, mood changes   Neurological: denies changes in sight, smell, hearing or taste. Denies seizures or tremors  Immunological:  denies anaphylaxis, or swollen lymph nodes  Musculoskeletal: denies joint pain, morning stiffness, decreased range of motion         O /82   Ht 65\"   Wt 121 lb (54.9 kg)   LMP 03/14/2024 (Approximate)   BMI 20.14 kg/m²         Wt Readings from Last 6 Encounters:   03/19/24 121 lb (54.9 kg)   01/01/24 125 lb (56.7 kg)   11/20/23 123 lb (55.8 kg)   10/19/23 127 lb (57.6 kg)   07/12/23 135 lb (61.2 kg)   04/24/23 145 lb (65.8 kg)     Exam:   GENERAL: well developed, well nourished, in no apparent distress, oriented.  SKIN: no rashes, no suspicious lesions  HEENT: normal  NECK: supple; no  thyromegaly, no adenopathy  LUNGS: clear to auscultation  CARDIOVASCULAR: normal S1, S2, RRR  BREASTS: nontender, no palpable masses or nodes, no nipple discharge, no skin changes, no axillary adenopathy  ABDOMEN: soft, non distended; non tender, no masses  PELVIC: External Genitalia: Normal appearing, no lesions.    Vagina: normal pink mucosa, no lesions, normal clear discharge.    Bladder well supported.  No  anterior or posterior hernias    Cervix: no lesions , No CMT     Uterus: mobile, non tender, normal size    Adnexa: non tender, no masses, normal size    Rectal: deferred  EXTREMITIES:  non tender without edema        A/P: Patient is 34 year old female with no complaints. Here for well woman exam.            Patient counseled on:    Diet/exercise.      Self Breast Exams     Safe sex practices / and living environment     Vaccines:  Annual Flu, Tdap--2020              Pap:normal/neg-2023  Lipid / Cholesterol:  2023         Meds This Visit:    Requested Prescriptions      No prescriptions requested or ordered in this encounter       1. Encounter for annual routine gynecological examination    2. Encounter for well woman exam with routine gynecological exam  - ThinPrep PAP Smear B; Future  - Hpv High Risk , Thin Prep Collect; Future    3. Screening for STDs (sexually transmitted diseases)  - Chlamydia/Gc Amplification; Future      No follow-ups on file.    Jason Jacobs MD   3/19/2024  9:49 AM

## 2024-03-20 LAB
C TRACH DNA SPEC QL NAA+PROBE: NEGATIVE
HPV I/H RISK 1 DNA SPEC QL NAA+PROBE: NEGATIVE
N GONORRHOEA DNA SPEC QL NAA+PROBE: NEGATIVE

## 2024-03-23 LAB
.: NORMAL
.: NORMAL

## 2024-04-18 ENCOUNTER — LAB ENCOUNTER (OUTPATIENT)
Dept: LAB | Age: 34
End: 2024-04-18
Attending: INTERNAL MEDICINE
Payer: COMMERCIAL

## 2024-04-18 DIAGNOSIS — T14.8XXA BRUISING: ICD-10-CM

## 2024-04-18 DIAGNOSIS — E53.8 LOW SERUM VITAMIN B12: ICD-10-CM

## 2024-04-18 DIAGNOSIS — E61.1 IRON DEFICIENCY: ICD-10-CM

## 2024-04-18 LAB
BASOPHILS # BLD AUTO: 0.04 X10(3) UL (ref 0–0.2)
BASOPHILS NFR BLD AUTO: 0.4 %
DEPRECATED HBV CORE AB SER IA-ACNC: 11.5 NG/ML
EOSINOPHIL # BLD AUTO: 0.12 X10(3) UL (ref 0–0.7)
EOSINOPHIL NFR BLD AUTO: 1.2 %
ERYTHROCYTE [DISTWIDTH] IN BLOOD BY AUTOMATED COUNT: 11.7 %
HCT VFR BLD AUTO: 38.5 %
HGB BLD-MCNC: 12.8 G/DL
IMM GRANULOCYTES # BLD AUTO: 0.04 X10(3) UL (ref 0–1)
IMM GRANULOCYTES NFR BLD: 0.4 %
IRON SATN MFR SERPL: 36 %
IRON SERPL-MCNC: 121 UG/DL
LYMPHOCYTES # BLD AUTO: 2.79 X10(3) UL (ref 1–4)
LYMPHOCYTES NFR BLD AUTO: 27.5 %
MCH RBC QN AUTO: 30.3 PG (ref 26–34)
MCHC RBC AUTO-ENTMCNC: 33.2 G/DL (ref 31–37)
MCV RBC AUTO: 91 FL
MONOCYTES # BLD AUTO: 0.67 X10(3) UL (ref 0.1–1)
MONOCYTES NFR BLD AUTO: 6.6 %
NEUTROPHILS # BLD AUTO: 6.5 X10 (3) UL (ref 1.5–7.7)
NEUTROPHILS # BLD AUTO: 6.5 X10(3) UL (ref 1.5–7.7)
NEUTROPHILS NFR BLD AUTO: 63.9 %
PLATELET # BLD AUTO: 308 10(3)UL (ref 150–450)
RBC # BLD AUTO: 4.23 X10(6)UL
TIBC SERPL-MCNC: 340 UG/DL (ref 240–450)
TRANSFERRIN SERPL-MCNC: 228 MG/DL (ref 200–360)
VIT B12 SERPL-MCNC: 582 PG/ML (ref 193–986)
WBC # BLD AUTO: 10.2 X10(3) UL (ref 4–11)

## 2024-04-18 PROCEDURE — 83550 IRON BINDING TEST: CPT

## 2024-04-18 PROCEDURE — 82728 ASSAY OF FERRITIN: CPT

## 2024-04-18 PROCEDURE — 82607 VITAMIN B-12: CPT

## 2024-04-18 PROCEDURE — 85025 COMPLETE CBC W/AUTO DIFF WBC: CPT

## 2024-04-18 PROCEDURE — 36415 COLL VENOUS BLD VENIPUNCTURE: CPT

## 2024-04-18 PROCEDURE — 83540 ASSAY OF IRON: CPT

## 2024-04-19 ENCOUNTER — TELEPHONE (OUTPATIENT)
Dept: HEMATOLOGY/ONCOLOGY | Facility: HOSPITAL | Age: 34
End: 2024-04-19

## 2024-04-19 PROBLEM — E61.1 IRON DEFICIENCY: Status: ACTIVE | Noted: 2024-04-19

## 2024-04-20 ENCOUNTER — HOSPITAL ENCOUNTER (OUTPATIENT)
Age: 34
Discharge: HOME | End: 2024-04-20
Payer: COMMERCIAL

## 2024-04-20 VITALS
SYSTOLIC BLOOD PRESSURE: 104 MMHG | BODY MASS INDEX: 19.99 KG/M2 | HEART RATE: 84 BPM | RESPIRATION RATE: 18 BRPM | HEIGHT: 65 IN | TEMPERATURE: 98 F | OXYGEN SATURATION: 99 % | WEIGHT: 120 LBS | DIASTOLIC BLOOD PRESSURE: 76 MMHG

## 2024-04-20 DIAGNOSIS — J01.40 ACUTE NON-RECURRENT PANSINUSITIS: Primary | ICD-10-CM

## 2024-04-20 RX ORDER — AMOXICILLIN AND CLAVULANATE POTASSIUM 875; 125 MG/1; MG/1
1 TABLET, FILM COATED ORAL 2 TIMES DAILY
Qty: 14 TABLET | Refills: 0 | Status: SHIPPED | OUTPATIENT
Start: 2024-04-20 | End: 2024-04-27

## 2024-04-20 NOTE — ED PROVIDER NOTES
Patient Seen in: [facility]      History     Chief Complaint   Patient presents with    Sinus Problem     Sick over a week, lost voice, now still hoarse and thick drainage - Entered by patient     Stated Complaint: Sinus Problem - Sick over a week, lost voice, now still hoarse and thick draina*    Subjective:   HPI    34-year-old female with known history of depression, anxiety comes in today complaining of sinus pressure, significant mucus production and green nasal discharge.  Patient denies fevers or chills.  Patient denies difficulty breathing or swallowing.  Patient states that symptoms have been present for 10 to 14 days and not improving with over-the-counter treatments.  Patient has long history of sinus infections.    Objective:   Past Medical History:    Acute otitis media    Anal fissure    Anxiety attack    Attention deficit disorder with hyperactivity(314.01)    Attention deficit disorder without mention of hyperactivity    Headache(784.0)    Hemorrhoids, internal, with bleeding    Knee pain    Major depressive disorder, recurrent episode, moderate (HCC)    Midepigastric pain    Migraines    Other postprocedural status(V45.89)    Overdose    Pain in joint, lower leg    Palpitations    Pharyngitis    recurrent    Rectal pain    Self-mutilation    Sinusitis    Suicidal ideation    Tobacco dependence    Unspecified sinusitis (chronic)    recurrent    URI (upper respiratory infection)    Visual impairment    GLASSES              Past Surgical History:   Procedure Laterality Date      2020    Cholecystectomy  13    by Dr. Holland @ [place]    Colposcopy, cervix w/upper adjacent vagina; w/biopsy(s), cervix  ,    [place]  2018    Sinus surgery        bilat endo sofia bullosa and middle turbinate  hypertrophy takedown w/right endoscopic maxillary antrostom anterior ethmoidectomy and left endo balloon maxillary antrostomiesbilat endo max sinus lavage and inferior  turbinate submucous resection and outfracture.                Social History     Socioeconomic History    Marital status:    Tobacco Use    Smoking status: Former     Current packs/day: 0.25     Types: Cigarettes    Smokeless tobacco: Never    Tobacco comments:     quit 10 years ago and re-started 2/22   Vaping Use    Vaping status: Never Used   Substance and Sexual Activity    Alcohol use: Yes    Drug use: Not Currently     Types: Cannabis     Comment: last use sept 5 ,2021    Sexual activity: Yes     Partners: Male   Other Topics Concern    Caffeine Concern Yes     Comment: 1 cup of coffee 3 days per week    Exercise Yes     Comment: fitness class weekly              Review of Systems    Positive for stated complaint: Sinus Problem - Sick over a week, lost voice, now still hoarse and thick draina*  Other systems are as noted in HPI.  Constitutional and vital signs reviewed.      All other systems reviewed and negative except as noted above.    Physical Exam     ED Triage Vitals [04/20/24 0910]   /76   Pulse 84   Resp 18   Temp 98 °F (36.7 °C)   Temp src Temporal   SpO2 99 %   O2 Device None (Room air)       Current:/76   Pulse 84   Temp 98 °F (36.7 °C) (Temporal)   Resp 18   Ht 165.1 cm (5' 5\")   Wt 54.4 kg   LMP 04/09/2024 (Approximate)   SpO2 99%   BMI 19.97 kg/m²         Physical Exam      General Appearance:  Alert and orientated x 4, cooperative, no distress, appropriate for age  Head:  Normocephalic, atraumatic, without obvious abnormality  Eyes:  PERRL, EOM's intact, conjunctiva and cornea clear, normal fundoscopic exam   Ears:  TM pearly gray color, mild bilateral effusion, external ear canals normal, both ears, no mastoid tenderness bilaterally   Nose:  Nares symmetrical, + erythema of bilateral nasal turbinates,+ yellow/green discharge; + bilateral maxillary sinus tenderness  Throat:  Lips, tongue, and mucosa are moist, pink, and intact; posterior pharynx without exudate or  erythema. +PND, No trismus or stridor, uvula midline.   Neck:  Supple; symmetrical, trachea midline, no adenopathy  Lungs:  Clear to auscultation bilaterally, respirations unlabored, no wheezing, rales or rhonchi   Heart:  Regular rate & rhythm, S1 and S2 normal, no murmurs, rubs, or gallops              Lymphatic:  No adenopathy  Skin/Hair/Nails:  Skin warm, dry and intact, no rashes or abnormal dyspigmentation  Neurologic:   Grossly intact, no cranial nerve deficits      ED Course   Labs Reviewed - No data to display      MDM          Medical Decision Making  34-year-old female with upper respiratory symptoms and sinus pressure.    Problems Addressed:  Acute non-recurrent pansinusitis: acute illness or injury    Risk  OTC drugs.  Prescription drug management.  Risk Details: Clinical Impression: Acute Sinusitis      The differential diagnosis before testing included viral upper respiratory infection,  acute sinusitis, eustachian tube dysfunction    Patient has had over 7 days of symptoms, at this time we will treat with oral antibiotics, take a probiotic while on the oral antibiotic continue Flonase and Zyrtec.        Disposition and Plan     Clinical Impression:  1. Acute non-recurrent pansinusitis         Disposition:  Discharge  4/20/2024  9:35 am    Follow-up:  Marcin Clifton MD  [address] [place] [address]  [address] [place] [address]  [phone]                Medications Prescribed:  Current Discharge Medication List        START taking these medications    Details   amoxicillin clavulanate 875-125 MG Oral Tab Take 1 tablet by mouth 2 (two) times daily for 7 days.  Qty: 14 tablet, Refills: 0                  This report has been produced using speech recognition software and may contain errors related to that system including, but not limited to, errors in grammar, punctuation, and spelling, as well as words and phrases that possibly may have been recognized inappropriately.  If there are any questions or concerns,  contact the dictating provider for clarification.     NOTE: The 21st Century Cares Act makes medical notes available to patients.  Be advised that this is a medical document written in medical language and may contain abbreviations or verbiage that is unfamiliar or direct.  It is primarily intended to carry relevant historical information, physical exam findings, and the clinical assessment of the physician.

## 2024-04-20 NOTE — ED INITIAL ASSESSMENT (HPI)
Pt c/o congestion, \"sinus drainage\", hoarse voice, symptoms started about 10x days ago. Pt concerned because drainage is \"thick\" and \"yellow-green\" in color

## 2024-04-29 ENCOUNTER — TELEPHONE (OUTPATIENT)
Dept: HEMATOLOGY/ONCOLOGY | Facility: HOSPITAL | Age: 34
End: 2024-04-29

## 2024-04-30 ENCOUNTER — APPOINTMENT (OUTPATIENT)
Dept: HEMATOLOGY/ONCOLOGY | Facility: HOSPITAL | Age: 34
End: 2024-04-30
Attending: INTERNAL MEDICINE
Payer: COMMERCIAL

## 2024-05-10 ENCOUNTER — OFFICE VISIT (OUTPATIENT)
Dept: HEMATOLOGY/ONCOLOGY | Facility: HOSPITAL | Age: 34
End: 2024-05-10
Attending: INTERNAL MEDICINE
Payer: COMMERCIAL

## 2024-05-10 VITALS
RESPIRATION RATE: 16 BRPM | OXYGEN SATURATION: 99 % | HEART RATE: 92 BPM | TEMPERATURE: 98 F | SYSTOLIC BLOOD PRESSURE: 115 MMHG | DIASTOLIC BLOOD PRESSURE: 80 MMHG

## 2024-05-10 DIAGNOSIS — E61.1 IRON DEFICIENCY: Primary | ICD-10-CM

## 2024-05-10 DIAGNOSIS — T78.40XA HYPERSENSITIVITY, INITIAL ENCOUNTER: ICD-10-CM

## 2024-05-10 PROCEDURE — 96375 TX/PRO/DX INJ NEW DRUG ADDON: CPT

## 2024-05-10 PROCEDURE — 96365 THER/PROPH/DIAG IV INF INIT: CPT

## 2024-05-10 PROCEDURE — 96376 TX/PRO/DX INJ SAME DRUG ADON: CPT

## 2024-05-10 PROCEDURE — 99213 OFFICE O/P EST LOW 20 MIN: CPT | Performed by: NURSE PRACTITIONER

## 2024-05-10 RX ORDER — METHYLPREDNISOLONE SODIUM SUCCINATE 40 MG/ML
40 INJECTION, POWDER, LYOPHILIZED, FOR SOLUTION INTRAMUSCULAR; INTRAVENOUS EVERY 6 HOURS
Status: DISCONTINUED | OUTPATIENT
Start: 2024-05-10 | End: 2024-05-10

## 2024-05-10 RX ORDER — DIPHENHYDRAMINE HYDROCHLORIDE 50 MG/ML
25 INJECTION INTRAMUSCULAR; INTRAVENOUS ONCE
Status: COMPLETED | OUTPATIENT
Start: 2024-05-10 | End: 2024-05-10

## 2024-05-10 RX ADMIN — DIPHENHYDRAMINE HYDROCHLORIDE 25 MG: 50 INJECTION INTRAMUSCULAR; INTRAVENOUS at 13:17:00

## 2024-05-10 RX ADMIN — METHYLPREDNISOLONE SODIUM SUCCINATE 40 MG: 40 INJECTION, POWDER, LYOPHILIZED, FOR SOLUTION INTRAMUSCULAR; INTRAVENOUS at 14:44:00

## 2024-05-10 NOTE — PROGRESS NOTES
Cancer Center Progress Note    Patient Name: Lisbeth Bowens   YOB: 1990   Medical Record Number: [id number]   [facility]: [id number]   Date of visit: 5/10/2024       Chief Complaint/Reason for Visit:  No chief complaint on file.       History of Present Illness:   Lisbeth Bowens is a 34 year old patient of Dr. Corbett with iron deficiency anemia. She is in clinic today receiving iron dextran. She completed the test dose and within a few minutes she began to have frequent sneezing, congestion, and an itchy throat. Her vitals were stable, no chest pain or shortness of breath. No skin rash. No other complaints      Problem List:  Patient Active Problem List   Diagnosis    Pain in joint, lower leg    Major depressive disorder, recurrent episode, moderate ([facility])    Suicidal ideation    Attention deficit hyperactivity disorder (ADHD)    Acute otitis media    Hemorrhoids, internal, with bleeding    Migraine without aura and without status migrainosus, not intractable    Adhesive capsulitis of right shoulder    Shoulder instability, right    History of colposcopy with cervical biopsy 2018 STEVE 3    H/O LEEP 2018 STEVE 2, +hpv    Pregnancy resulting from assisted reproductive technology in second trimester ([facility])    Depression affecting pregnancy ([facility])    History of loop electrosurgical excision procedure (LEEP) of cervix affecting pregnancy in second trimester ([facility])    S/P primary low transverse  2020    Overweight (BMI 25.0-29.9)    Binge eating    Seasonal allergies    Deviated septum    Iron deficiency        Medical History:  Past Medical History:    Acute otitis media    Anal fissure    Anxiety attack    Attention deficit disorder with hyperactivity(314.01)    Attention deficit disorder without mention of hyperactivity    Headache(784.0)    Hemorrhoids, internal, with bleeding    Knee pain    Major depressive disorder, recurrent episode, moderate ([facility])    Midepigastric pain    Migraines     Other postprocedural status(V45.89)    Overdose    Pain in joint, lower leg    Palpitations    Pharyngitis    recurrent    Rectal pain    Self-mutilation    Sinusitis    Suicidal ideation    Tobacco dependence    Unspecified sinusitis (chronic)    recurrent    URI (upper respiratory infection)    Visual impairment    GLASSES       Surgical History:  Past Surgical History:   Procedure Laterality Date      2020    Cholecystectomy  13    by Dr. Holland @ [place]    Colposcopy, cervix w/upper adjacent vagina; w/biopsy(s), cervix  ,    Leep  2018    Sinus surgery    2010    bilat endo sofia bullosa and middle turbinate  hypertrophy takedown w/right endoscopic maxillary antrostom anterior ethmoidectomy and left endo balloon maxillary antrostomiesbilat endo max sinus lavage and inferior turbinate submucous resection and outfracture.       Allergies:  Allergies   Allergen Reactions    Cefadroxil HIVES     hives  hives    Bactrim [Sulfamethoxazole W/Trimethoprim] JITTERY     Hot, sweaty    Duricef [Cefadroxil Monohydrate]      hives    Levaquin [Levofloxacin] OTHER (SEE COMMENTS)     Severe tendonitis       Family History:  Family History   Problem Relation Age of Onset    Heart Disorder Father     Lipids Father     Pulmonary Disease Father     Hypertension Father     Other (Other) Father     Depression Mother     Other (mother was adopted) Mother     Cancer Maternal Grandmother         kidney cancer    Cancer Paternal Grandmother         uterine cancer    Other (Other) Sister         cerebral palsy       Social History:  Social History     Socioeconomic History    Marital status:      Spouse name: Not on file    Number of children: Not on file    Years of education: Not on file    Highest education level: Not on file   Occupational History    Not on file   Tobacco Use    Smoking status: Former     Current packs/day: 0.25     Types: Cigarettes    Smokeless tobacco: Never    Tobacco comments:      quit 10 years ago and re-started 2/22   Vaping Use    Vaping status: Never Used   Substance and Sexual Activity    Alcohol use: Yes    Drug use: Not Currently     Types: Cannabis     Comment: last use sept 5 ,2021    Sexual activity: Yes     Partners: Male   Other Topics Concern     Service Not Asked    Blood Transfusions Not Asked    Caffeine Concern Yes     Comment: 1 cup of coffee 3 days per week    Occupational Exposure Not Asked    Hobby Hazards Not Asked    Sleep Concern Not Asked    Stress Concern Not Asked    Weight Concern Not Asked    Special Diet Not Asked    Back Care Not Asked    Exercise Yes     Comment: fitness class weekly    Bike Helmet Not Asked    Seat Belt Not Asked    Self-Exams Not Asked   Social History Narrative    Not on file     Social Determinants of Health     Financial Resource Strain: Not on file   Food Insecurity: Not on file   Transportation Needs: Not on file   Physical Activity: Not on file   Stress: Not on file   Social Connections: Not on file   Housing Stability: Not on file       Medications:    Current Outpatient Medications:     levothyroxine 25 MCG Oral Tab, Take 25 mcg by mouth before breakfast., Disp: , Rfl:     buPROPion  MG Oral Tablet 24 Hr, Take 1 tablet (150 mg total) by mouth every morning., Disp: 90 tablet, Rfl: 0    ALPRAZolam (XANAX) 0.5 MG Oral Tab, Take 1 tablet (0.5 mg total) by mouth 3 (three) times daily as needed., Disp: 30 tablet, Rfl: 0    traZODone 50 MG Oral Tab, Take 1 tablet (50 mg total) by mouth nightly as needed for Sleep., Disp: 30 tablet, Rfl: 2    FLUoxetine HCl 40 MG Oral Cap, Take 1 capsule (40 mg total) by mouth every morning., Disp: 90 capsule, Rfl: 0    lamoTRIgine 200 MG Oral Tab, Take 1 tablet (200 mg total) by mouth 2 (two) times daily., Disp: 180 tablet, Rfl: 0    VYVANSE 40 MG Oral Cap, Take 1 capsule (40 mg total) by mouth every morning., Disp: 30 capsule, Rfl: 0    temazepam 30 MG Oral Cap, Take 1 capsule (30 mg  total) by mouth nightly as needed for Sleep., Disp: 30 capsule, Rfl: 2    Review of Systems:  A comprehensive 14 point review of systems was completed.  Pertinent positives and negatives noted in the HPI.    Performance Status: ECOG 0 - Fully active, able to carry on all predisease activities without restrictions.    Physical Examination:  Vital Signs: Height: --  Weight: --  BSA (Calculated - sq m): --  Pulse: 94 (05/10 1447)  BP: 120/81 (05/10 1447)  Temp: 97.5 °F (36.4 °C) (05/10 1442)  Do Not Use - Resp Rate: --  SpO2: 100 % (05/10 1447)    General: Patient is alert and oriented x 3, not in acute distress.  Chest: Clear to auscultation. Respirations unlabored.   Heart: Regular rate and rhythm.   Abdomen: Soft, non-distended, non-tender with present bowel sounds.  Extremities: No edema.  Neurological: Grossly intact.   Skin: warm, dry, no erythema or rash   Psych/Depression: mood and affect are appropriate.     Labs:   No results found for this or any previous visit (from the past 72 hour(s)).    Impression/Plan      Hypersensitivity:  Pt received test dose of Infed, within 5 minutes of completion she began to have sneezing, congestion and itchy throat. Vitals remained stable. No shortness of breath or chest pain. No skin rash.   25mg IV benadryl administered as vitals were normal.  Vitals and pt were monitored closely. Waited 15 minutes prior to continuation of iron infusion.    Pt received about 75% of the medication when she then developed a single hive on her left forearm, where the IV tape was, with worsening congestion. Infusion stopped and pt was given 40mg solumedrol. Waited 15 minutes and infusion was restarted. Pt was able to receive the remainder of infed without further complication. Vitals remained stable.    She was instructed to call or report to the ER with any new or worsening complaints. Okay to use benadryl or non drowsy antihistamine if needed.     Iron deficiency anemia:  Did not tolerate  oral iron, receiving IV iron dextran today.        The 21st Century Cures Act makes medical notes like these available to patients in the interest of transparency. Please be advised this is a medical document. Medical documents are intended to carry relevant information, facts as evident, and the clinical opinion of the practitioner. The medical note is intended as peer to peer communication and may appear blunt or direct. It is written in medical language and may contain abbreviations or verbiage that are unfamiliar.     Electronically Signed by:    STACY Mast-BC  Nurse Practitioner  [facility]

## 2024-05-10 NOTE — PROGRESS NOTES
Education Record    Learner:  Patient    Disease / Diagnosis: iron deficiency     Barriers / Limitations:  None   Comments:    Method:  Discussion   Comments:    General Topics:  Medication, Side effects and symptom management, Plan of care reviewed, and Fall risk and prevention   Comments:    Outcome:  Shows understanding   Comments:    Pt here for first infed iron infusion. Approx 5 mins after test dose administration, pt reported sinus congestion, cough, sneezing and itchiness in her throat. Vital signs stable. Lorraine APVAL called to assess - 25mg benadryl given with relief of symptoms. Full dose administered - about 75% of the way through,pt developed hive on left forearm and increased congestion. Infusion stopped, Lorraine called to chairside, 40mg solumedrol given. Pt reporting relief in symptoms and would like to try to complete infusion - remainder of infusion tolerated well. Pt discharged ambulatory in stable condition.  
Unknown

## 2024-05-13 ENCOUNTER — TELEPHONE (OUTPATIENT)
Dept: HEMATOLOGY/ONCOLOGY | Facility: HOSPITAL | Age: 34
End: 2024-05-13

## 2024-07-28 ENCOUNTER — HOSPITAL ENCOUNTER (OUTPATIENT)
Age: 34
Discharge: HOME | End: 2024-07-28
Payer: COMMERCIAL

## 2024-07-28 VITALS
OXYGEN SATURATION: 99 % | TEMPERATURE: 98 F | DIASTOLIC BLOOD PRESSURE: 67 MMHG | BODY MASS INDEX: 20.83 KG/M2 | WEIGHT: 125 LBS | HEIGHT: 65 IN | HEART RATE: 90 BPM | RESPIRATION RATE: 20 BRPM | SYSTOLIC BLOOD PRESSURE: 105 MMHG

## 2024-07-28 DIAGNOSIS — R05.1 ACUTE COUGH: ICD-10-CM

## 2024-07-28 DIAGNOSIS — R09.82 POSTNASAL DRIP: ICD-10-CM

## 2024-07-28 DIAGNOSIS — J01.40 ACUTE PANSINUSITIS, RECURRENCE NOT SPECIFIED: Primary | ICD-10-CM

## 2024-07-28 RX ORDER — AMOXICILLIN AND CLAVULANATE POTASSIUM 875; 125 MG/1; MG/1
1 TABLET, FILM COATED ORAL 2 TIMES DAILY
Qty: 14 TABLET | Refills: 0 | Status: SHIPPED | OUTPATIENT
Start: 2024-07-28 | End: 2024-08-04

## 2024-07-28 RX ORDER — DOXYCYCLINE HYCLATE 100 MG/1
100 CAPSULE ORAL 2 TIMES DAILY
Qty: 14 CAPSULE | Refills: 0 | Status: SHIPPED | OUTPATIENT
Start: 2024-07-28 | End: 2024-07-28 | Stop reason: CLARIF

## 2024-07-28 RX ORDER — BENZONATATE 100 MG/1
100 CAPSULE ORAL 3 TIMES DAILY PRN
Qty: 15 CAPSULE | Refills: 0 | Status: SHIPPED | OUTPATIENT
Start: 2024-07-28

## 2024-07-28 RX ORDER — PREDNISONE 20 MG/1
40 TABLET ORAL DAILY
Qty: 10 TABLET | Refills: 0 | Status: SHIPPED | OUTPATIENT
Start: 2024-07-28 | End: 2024-08-02

## 2024-07-28 NOTE — ED INITIAL ASSESSMENT (HPI)
Pt began 10 days ago with sinus congestion and pressure and now it is thick and draining into her chest causing her to cough, and she has facial pressure

## 2024-07-28 NOTE — ED NOTES
Pt states doxy does not work for her and she wants Augmentin, provider spoke with pt, and will address and change

## 2024-07-28 NOTE — ED PROVIDER NOTES
Patient Seen in: Immediate Care [facility]      History     Chief Complaint   Patient presents with    Sinus Problem     Sinus infection into chest - Entered by patient     Stated Complaint: Sinus Problem - Sinus infection into chest    Subjective:   HPI    33 YO female presents to immediate care with 10-day history of sinus pressure/pain, postnasal drip causing cough.  She reports history of sinus infections and recent symptoms feel similar.  She denies fever/chills.  Home COVID test 2 days ago was negative.        Objective:   Past Medical History:    Acute otitis media    Anal fissure    Anxiety attack    Attention deficit disorder with hyperactivity(314.01)    Attention deficit disorder without mention of hyperactivity    Headache(784.0)    Hemorrhoids, internal, with bleeding    Knee pain    Major depressive disorder, recurrent episode, moderate ([facility])    Midepigastric pain    Migraines    Other postprocedural status(V45.89)    Overdose    Pain in joint, lower leg    Palpitations    Pharyngitis    recurrent    Rectal pain    Self-mutilation    Sinusitis    Suicidal ideation    Tobacco dependence    Unspecified sinusitis (chronic)    recurrent    URI (upper respiratory infection)    Visual impairment    GLASSES              Past Surgical History:   Procedure Laterality Date      2020    Cholecystectomy  13    by Dr. Holland @ [place]    Colposcopy, cervix w/upper adjacent vagina; w/biopsy(s), cervix  ,2018    Leep  2018    Sinus surgery    2010    bilat endo sofia bullosa and middle turbinate  hypertrophy takedown w/right endoscopic maxillary antrostom anterior ethmoidectomy and left endo balloon maxillary antrostomiesbilat endo max sinus lavage and inferior turbinate submucous resection and outfracture.                Social History     Socioeconomic History    Marital status:    Tobacco Use    Smoking status: Former     Current packs/day: 0.25     Types: Cigarettes     Smokeless tobacco: Never    Tobacco comments:     quit 10 years ago and re-started 2/22   Vaping Use    Vaping status: Never Used   Substance and Sexual Activity    Alcohol use: Not Currently    Drug use: Not Currently     Types: Cannabis     Comment: last use sept 5 ,2021    Sexual activity: Yes     Partners: Male   Other Topics Concern    Caffeine Concern Yes     Comment: 1 cup of coffee 3 days per week    Exercise Yes     Comment: fitness class weekly              Review of Systems    Positive for stated Chief Complaint: Sinus Problem (Sinus infection into chest - Entered by patient)    Other systems are as noted in HPI.  Constitutional and vital signs reviewed.      All other systems reviewed and negative except as noted above.    Physical Exam     ED Triage Vitals [07/28/24 0932]   /67   Pulse 90   Resp 20   Temp 97.7 °F (36.5 °C)   Temp src Temporal   SpO2 99 %   O2 Device None (Room air)       Current Vitals:   Vital Signs  BP: 105/67  Pulse: 90  Resp: 20  Temp: 97.7 °F (36.5 °C)  Temp src: Temporal    Oxygen Therapy  SpO2: 99 %  O2 Device: None (Room air)            Physical Exam  Vitals and nursing note reviewed.   Constitutional:       General: She is not in acute distress.     Appearance: Normal appearance. She is not ill-appearing, toxic-appearing or diaphoretic.   HENT:      Right Ear: Tympanic membrane and ear canal normal.      Left Ear: Tympanic membrane and ear canal normal.      Nose: No congestion or rhinorrhea.      Right Sinus: Maxillary sinus tenderness and frontal sinus tenderness present.      Left Sinus: Maxillary sinus tenderness and frontal sinus tenderness present.      Mouth/Throat:      Mouth: Mucous membranes are moist.      Pharynx: Oropharynx is clear.   Cardiovascular:      Rate and Rhythm: Normal rate and regular rhythm.   Pulmonary:      Effort: Pulmonary effort is normal. No respiratory distress.      Breath sounds: Normal breath sounds. No wheezing.   Neurological:       Mental Status: She is alert and oriented to person, place, and time.   Psychiatric:         Mood and Affect: Mood normal.         Behavior: Behavior normal.           ED Course   Labs Reviewed - No data to display      MDM      Differential diagnosis considered but not limited to viral sinusitis, bacterial sinusitis, less likely pneumonia    Bilateral frontal and maxillary sinus tenderness. Physical exam is otherwise unremarkable. Considering duration of sinusitis, will prescribe Augmentin to cover possible bacterial component.  Patient has a documented cephalosporin allergy but states she has taken Augmentin in the past without any issues and prefers this over Doxycycline. Prednisone also prescribed. Cough likely from postnasal drip.  Tessalon Perles prescribed.      Medical Decision Making  Risk  Prescription drug management.        Disposition and Plan     Clinical Impression:  1. Acute pansinusitis, recurrence not specified    2. Postnasal drip    3. Acute cough         Disposition:  Discharge  7/28/2024  9:56 am    Follow-up:  [facility] [address] [address]  [phone]    If symptoms worsen          Medications Prescribed:  Discharge Medication List as of 7/28/2024  9:57 AM        START taking these medications    Details   doxycycline 100 MG Oral Cap Take 1 capsule (100 mg total) by mouth 2 (two) times daily for 7 days., Normal, Disp-14 capsule, R-0      predniSONE 20 MG Oral Tab Take 2 tablets (40 mg total) by mouth daily for 5 days., Normal, Disp-10 tablet, R-0      benzonatate (TESSALON PERLES) 100 MG Oral Cap Take 1 capsule (100 mg total) by mouth 3 (three) times daily as needed for cough., Normal, Disp-15 capsule, R-0

## 2024-08-20 ENCOUNTER — APPOINTMENT (OUTPATIENT)
Dept: GENERAL RADIOLOGY | Age: 34
End: 2024-08-20
Attending: NURSE PRACTITIONER
Payer: COMMERCIAL

## 2024-08-20 ENCOUNTER — HOSPITAL ENCOUNTER (OUTPATIENT)
Age: 34
Discharge: HOME | End: 2024-08-20
Payer: COMMERCIAL

## 2024-08-20 VITALS
DIASTOLIC BLOOD PRESSURE: 82 MMHG | SYSTOLIC BLOOD PRESSURE: 105 MMHG | RESPIRATION RATE: 21 BRPM | OXYGEN SATURATION: 100 % | HEART RATE: 94 BPM | TEMPERATURE: 98 F

## 2024-08-20 DIAGNOSIS — R05.1 ACUTE COUGH: Primary | ICD-10-CM

## 2024-08-20 DIAGNOSIS — R09.82 PND (POST-NASAL DRIP): ICD-10-CM

## 2024-08-20 PROCEDURE — 71046 X-RAY EXAM CHEST 2 VIEWS: CPT | Performed by: NURSE PRACTITIONER

## 2024-08-20 PROCEDURE — 99213 OFFICE O/P EST LOW 20 MIN: CPT | Performed by: NURSE PRACTITIONER

## 2024-08-20 RX ORDER — PREDNISONE 20 MG/1
20 TABLET ORAL DAILY
Qty: 3 TABLET | Refills: 0 | Status: SHIPPED | OUTPATIENT
Start: 2024-08-20 | End: 2024-08-23

## 2024-08-20 RX ORDER — LIDOCAINE HYDROCHLORIDE 20 MG/ML
5 SOLUTION OROPHARYNGEAL
Qty: 60 ML | Refills: 0 | Status: SHIPPED | OUTPATIENT
Start: 2024-08-20

## 2024-08-20 NOTE — ED PROVIDER NOTES
Patient Seen in: Immediate Care [facility]      History     Chief Complaint   Patient presents with    Sinus Problem     Entered by patient     Stated Complaint: Sinus Problem /cough x 5 days    Subjective:   HPI    34-year-old female with history of anxiety and depression presents today with complaints of a nonproductive cough for 5 days.  Patient states that she is prone to sinus infections and did see an ear nose and throat specialist in her past.  Patient states that she recently completed an antibiotic 3 weeks ago.    Objective:   Past Medical History:    Acute otitis media    Anal fissure    Anxiety attack    Attention deficit disorder with hyperactivity(314.01)    Attention deficit disorder without mention of hyperactivity    Headache(784.0)    Hemorrhoids, internal, with bleeding    Knee pain    Major depressive disorder, recurrent episode, moderate ([facility])    Midepigastric pain    Migraines    Other postprocedural status(V45.89)    Overdose    Pain in joint, lower leg    Palpitations    Pharyngitis    recurrent    Rectal pain    Self-mutilation    Sinusitis    Suicidal ideation    Tobacco dependence    Unspecified sinusitis (chronic)    recurrent    URI (upper respiratory infection)    Visual impairment    GLASSES              Past Surgical History:   Procedure Laterality Date      2020    Cholecystectomy  13    by Dr. Holland @ [place]    Colposcopy, cervix w/upper adjacent vagina; w/biopsy(s), cervix  ,2018    Leep  2018    Sinus surgery    2010    bilat endo sofia bullosa and middle turbinate  hypertrophy takedown w/right endoscopic maxillary antrostom anterior ethmoidectomy and left endo balloon maxillary antrostomiesbilat endo max sinus lavage and inferior turbinate submucous resection and outfracture.                Social History     Socioeconomic History    Marital status:    Tobacco Use    Smoking status: Former     Current packs/day: 0.25     Types: Cigarettes      Passive exposure: Never    Smokeless tobacco: Never    Tobacco comments:     quit 10 years ago and re-started 2/22   Vaping Use    Vaping status: Never Used   Substance and Sexual Activity    Alcohol use: Not Currently    Drug use: Not Currently     Types: Cannabis     Comment: last use sept 5 ,2021    Sexual activity: Yes     Partners: Male   Other Topics Concern    Caffeine Concern Yes     Comment: 1 cup of coffee 3 days per week    Exercise Yes     Comment: fitness class weekly              Review of Systems   Constitutional: Negative.    HENT: Negative.     Eyes: Negative.    Respiratory:  Positive for cough.    Cardiovascular: Negative.    Gastrointestinal: Negative.    Endocrine: Negative.    Genitourinary: Negative.    Musculoskeletal: Negative.    Skin: Negative.    Allergic/Immunologic: Negative.    Neurological: Negative.    Hematological: Negative.    Psychiatric/Behavioral: Negative.         Positive for stated Chief Complaint: Sinus Problem (Entered by patient)    Other systems are as noted in HPI.  Constitutional and vital signs reviewed.      All other systems reviewed and negative except as noted above.    Physical Exam     ED Triage Vitals [08/20/24 1122]   /82   Pulse 94   Resp 21   Temp 97.9 °F (36.6 °C)   Temp src Temporal   SpO2 100 %   O2 Device None (Room air)       Current Vitals:   Vital Signs  BP: 105/82  Pulse: 94  Resp: 21  Temp: 97.9 °F (36.6 °C)  Temp src: Temporal    Oxygen Therapy  SpO2: 100 %  O2 Device: None (Room air)            Physical Exam  Vitals and nursing note reviewed.   Constitutional:       Appearance: Normal appearance. She is normal weight.   HENT:      Head: Normocephalic.      Right Ear: Tympanic membrane, ear canal and external ear normal.      Left Ear: Tympanic membrane, ear canal and external ear normal.      Nose: Nose normal.      Mouth/Throat:      Mouth: Mucous membranes are moist.      Pharynx: Oropharynx is clear.      Comments: PND  Eyes:       Extraocular Movements: Extraocular movements intact.      Conjunctiva/sclera: Conjunctivae normal.      Pupils: Pupils are equal, round, and reactive to light.   Cardiovascular:      Rate and Rhythm: Normal rate and regular rhythm.      Pulses: Normal pulses.      Heart sounds: Normal heart sounds.   Pulmonary:      Effort: Pulmonary effort is normal.      Breath sounds: Normal breath sounds.   Musculoskeletal:      Cervical back: Normal range of motion and neck supple.   Skin:     General: Skin is warm.      Capillary Refill: Capillary refill takes less than 2 seconds.   Neurological:      General: No focal deficit present.      Mental Status: She is alert.   Psychiatric:         Mood and Affect: Mood normal.             ED Course   Labs Reviewed - No data to display                   MDM      34-year-old female with history of anxiety and depression presents today with complaints of a nonproductive cough for 5 days.  Patient states that she is prone to sinus infections and did see an ear nose and throat specialist in her past.  Patient states that she recently completed an antibiotic 3 weeks ago.  Vital signs: Please see EMR.  Physical exam: Please see exam.  Differential diagnosis: Postnasal drip, bronchitis, pneumonia, sinusitis.  XR CHEST PA + LAT CHEST (CPT=71046)    Result Date: 8/20/2024  CONCLUSION:   Normal cardiac and mediastinal contours.  No pulmonary edema or focal airspace consolidation.  The pleural spaces are clear.  Regional osseous structures are normal.    LOCATION:  [facility]   Dictated by (CST): Jesus Nunez MD on 8/20/2024 at 11:48 AM     Finalized by (CST): Jesus Nunez MD on 8/20/2024 at 11:48 AM      Based on physical exam and HPI will diagnosed with postnasal drip and cough.  Will prescribe a short course of steroid and viscous lidocaine.  Patient instructed to increase her water intake 2 to 3 L and to change out the filter in her AC unit.  ED precautions given.                                    Medical Decision Making  34-year-old female with history of anxiety and depression presents today with complaints of a nonproductive cough for 5 days.  Patient states that she is prone to sinus infections and did see an ear nose and throat specialist in her past.  Patient states that she recently completed an antibiotic 3 weeks ago.      Problems Addressed:  Acute cough: acute illness or injury  PND (post-nasal drip): acute illness or injury    Amount and/or Complexity of Data Reviewed  External Data Reviewed: notes.  Radiology: ordered. Decision-making details documented in ED Course.    Risk  Prescription drug management.        Disposition and Plan     Clinical Impression:  1. Acute cough    2. PND (post-nasal drip)         Disposition:  Discharge  8/20/2024 11:55 am    Follow-up:  Marcin Clifton MD  [address] [place] [address]  [address] [place] [address]  [phone]    Call             Medications Prescribed:  Current Discharge Medication List        START taking these medications    Details   Lidocaine Viscous HCl 2 % Mouth/Throat Solution Take 5 mL by mouth every 3 (three) hours as needed for Pain.  Qty: 60 mL, Refills: 0

## 2024-08-20 NOTE — ED INITIAL ASSESSMENT (HPI)
5 days of sinus pressure and congestion  Post nasal drip  Ear pressure  Headache  Constat dry non productive cough

## 2024-11-18 ENCOUNTER — OFFICE VISIT (OUTPATIENT)
Dept: ORTHOPEDICS CLINIC | Facility: CLINIC | Age: 34
End: 2024-11-18
Payer: COMMERCIAL

## 2024-11-18 DIAGNOSIS — M75.21 BICEPS TENDINITIS OF RIGHT UPPER EXTREMITY: ICD-10-CM

## 2024-11-18 DIAGNOSIS — M75.22 BICEPS TENDINITIS OF LEFT UPPER EXTREMITY: ICD-10-CM

## 2024-11-18 DIAGNOSIS — M75.41 SUBACROMIAL IMPINGEMENT OF RIGHT SHOULDER: ICD-10-CM

## 2024-11-18 DIAGNOSIS — M75.42 SUBACROMIAL IMPINGEMENT OF LEFT SHOULDER: Primary | ICD-10-CM

## 2024-11-18 RX ORDER — TRIAMCINOLONE ACETONIDE 40 MG/ML
40 INJECTION, SUSPENSION INTRA-ARTICULAR; INTRAMUSCULAR ONCE
Status: COMPLETED | OUTPATIENT
Start: 2024-11-18 | End: 2024-11-18

## 2024-11-18 RX ORDER — KETOROLAC TROMETHAMINE 30 MG/ML
30 INJECTION, SOLUTION INTRAMUSCULAR; INTRAVENOUS ONCE
Status: COMPLETED | OUTPATIENT
Start: 2024-11-18 | End: 2024-11-18

## 2024-11-18 RX ADMIN — KETOROLAC TROMETHAMINE 30 MG: 30 INJECTION, SOLUTION INTRAMUSCULAR; INTRAVENOUS at 09:40:00

## 2024-11-18 RX ADMIN — TRIAMCINOLONE ACETONIDE 40 MG: 40 INJECTION, SUSPENSION INTRA-ARTICULAR; INTRAMUSCULAR at 09:40:00

## 2024-11-18 NOTE — PROCEDURES
Bilateral Shoulder Glenohumeral Joint Injection    Name: Lisbeth Bowens   MRN: [id number]  Date: 11/18/2024     Clinical Indications:   Persistent Shoulder pain refractory to conservative measures.     After informed consent, the injection site was marked, sterilized with topical chlorhexidine antiseptic, and locally anesthetized with skin refrigerant.    The patient was seated upright and the shoulder was exposed. Using sterile technique: 0.5 mL of 30mg/mL of Ketorolac, 2 mL of 0.5% Bupivicaine, 2 mL of 1% Lidocaine, and 1 mg of 40mg/mL of Triamcinolone (Kenalog) was injected with a Anterior approach utilizing a 22 gauge needle.  A band-aid was applied.  The patient tolerated the procedure well.        Judith Nguyễn MD  Knee, Shoulder, & Elbow Surgery / Sports Medicine Specialist  Orthopaedic Surgery  [address] [place] [address] [id number] [place] [address].org  [email or web address]  t: [phone]  o: [phone]  f: [phone]

## 2024-11-18 NOTE — PROGRESS NOTES
Orthopaedic Surgery  [address] [place] [id number]  [phone]       Name: Lisbeth Bowens   MRN: [id number]  Date: 11/18/2024     REASON FOR VISIT:   Follow up of right shoulder pain and weakness in the setting of rotator cuff pathology.   Left shoulder pain    INTERVAL HISTORY:  Lisbeth Bowens is a 34 year old right-hand dominant female who presents today for repeat evaluation of right partial supraspinatus tearing and calcific tendinitis.    To summarize: right shoulder pain ongoing since approximately February 2023 with increasing stiffness and weakness with reduction in range of motion. She has a complex history of multiple previous corticosteroid injections, initially on September 2021 and then ultrasound guided barbotage and break-up of calcific tendinitis with some symptomatic improvement/relief.       We have been managing conservatively with steroid injections on 3/10/23 and 11/20/23. She has been engaged in physical therapy biweekly. Overall, her symptoms have improved but now she has increasing pain in the left shoulder rated at 4-5/10. This has been ongoing for the past month.       PE:   There were no vitals filed for this visit.  Estimated body mass index is 20.8 kg/m² as calculated from the following:    Height as of 7/28/24: 5' 5\" (1.651 m).    Weight as of 7/28/24: 125 lb.    Physical Exam  Constitutional:       Appearance: Normal appearance.   HENT:      Head: Normocephalic and atraumatic.   Eyes:      Extraocular Movements: Extraocular movements intact.   Neck:      Musculoskeletal: Normal range of motion and neck supple.   Cardiovascular:      Pulses: Normal pulses.   Pulmonary:      Effort: Pulmonary effort is normal. No respiratory distress.   Abdominal:      General: There is no distension.   Skin:     General: Skin is warm.      Capillary Refill: Capillary refill takes less than 2 seconds.      Findings: No bruising.   Neurological:      General: No focal deficit  present.      Mental Status: She is alert.   Psychiatric:         Mood and Affect: Mood normal.     Examination of the shoulders demonstrates:     Skin is intact, warm and dry.   Cervical:  Full ROM  Spurling's  Negative    Deformity:   none  Atrophy:   none    Scapular winging: Negative    Palpation:     AC Joint   Negative  Biceps Tendon  Negative  Greater Tuberosity Negative    ROM:   Forward Flexion:  full and symmetric  Abduction:   full and symmetric  External Rotation:  full and symmetric  Internal Rotation:  full and symmetric    Rotator Cuff Strength:   Supraspinatus:   5/5  Subscapularis:   5/5  Infraspinatus/Teres: 5/5    Provocative Tests: (more pronounced on the left shoulder)  Tucker:   Positive  Speed's:   Positive  [place]'s:   Positive  Lift-off:    Negative  Apprehension:  Negative  Sulcus Sign:   Negative    Neurovascular Upper Extremity (Bilateral)  Motor:    5/5 EPL, Finger Abduction, , Pinch, Deltoid  Sensation:   intact to light touch median, ulnar, radial and axillary nerve  Circulation:   Normal, 2+ radial pulse    The contralateral upper extremity is without limitation in range of motion or strength, no positive provocative maneuvers.      Radiographic Examination/Diagnostics:    MRI of the shoulder personally viewed, independently interpreted and radiology report was reviewed.     MRI SHOULDER, RIGHT (CPT=73221)     Result Date: 3/2/2023  Exam: MRI SHOULDER RT W/O CONTRAST CPT Code(s): 64084 - MRI JOINT UPR EXTREM W/O DYE INDICATION:  Chronic right shoulder pain with decreased range of motion. History of multiple cortisone injections and failed physical therapy. History of calcific tendinitis which has flared up. COMPARISON:  7/2/2021. TECHNIQUE: Routine MR exam of the right shoulder performed on a wide bore ultra high field 3.0 T Siemens Skyra MR scanner, without intravenous contrast. FINDINGS:  Small residual foci of low signal intensity are noted within the supraspinatus tendon  suggesting mild residual calcific tendinopathy (series 12, image 14) which has significantly decreased in extent from the 7/2/2021 study. Supraspinatus tendinosis is again identified with bursal surface fraying which has minimally progressed from the prior study. The rotator cuff tendons are otherwise intact and unremarkable with no discrete rotator cuff tendon tear.  There is no evidence rotator cuff muscle atrophy or edema. Mild subacromial/subdeltoid bursitis has progressed from the prior study. There is a normal rounded morphology of the humeral head with no evidence of posttraumatic deformity. The acromioclavicular and glenohumeral joint spaces are well-preserved.  Minimal degenerative/reactive marrow signal changes are again noted within the humeral head at the rotator cuff insertion. These are unchanged. No new bone marrow signal abnormalities are identified with no definitive fracture bone contusion. There is a type 1 acromial configuration. There is no significant glenohumeral joint effusion. No glenoid labral abnormalities are appreciated given the nonarthrographic MRI technique. The proximal intra-articular long biceps tendon is intact, and normally situated within the bicipital groove.      IMPRESSION:   1. Redemonstrated foci of calcific tendinopathy within the supraspinatus tendon. These calcific foci are significantly decreased in extent in comparison to 7/2/2021. Supraspinatus tendinosis is again identified with slight progression of bursal surface fraying. No evidence of a rotator cuff tendon tear.   2. Mild subacromial/subdeltoid bursitis has progressed from 7/2/2021.   3. Otherwise unremarkable MRI study of the right shoulder with no evidence of a glenoid labral tear.   Interpreting Radiologist: Valdo Pappas M.D. Electronically Signed: 03/02/2023 06:34 PM    IMPRESSION: Lisbeth Bowens is a 34 year old with bilateral biceps tendinitis and subacromial impingement. She received right  shoulder steroid injections on 3/10/23 and 11/20/23 and initiated physical therapy with notable improvement.     We elected to maximize conservative management with bilateral intra-articular corticosteroid and ketorolac injections coupled with continued physical therapy. MRI scans were also ordered for further evaluation.    PLAN:   We reviewed the treatment of this disease condition.  Fortunately, treatment is amenable to conservative treatment which we chose to optimize at today's visit.  After a discussion of a variety of conservative treatment options, I recommended intra-articular injection with corticosteroid and ketorolac coupled with physical therapy to aid in strengthening, range of motion, functional improvement, and return to baseline activity.       We elected to proceed with the injection procedure at today's visit. We discussed the risk and benefits of the procedure; including, but not limited to: infection, injury to blood vessels, nerve injury, prolonged pain, swelling, site soreness, failure to progress, and need for advanced treatments.  The patient voiced understanding and agreed to proceed with the treatment plan.      In light of the chronicity of symptoms, loss of normal function, and  failure to progress conservatively we recommend an MRI to evaluate the integrity of the patient's findings. The patient will follow up after imaging.   Differential diagnosis includes but not limited to: rotator cuff/labral pathology, impingement, tendinopathy, cartilage injury/loose body, bone marrow edema, and osteoarthritis.     External records were also reviewed for pertinent historical findings contributing to the patients undiagnosed new problem with uncertain prognosis.     The patient had the opportunity to ask questions, and all questions were answered appropriately.      FOLLOW-UP:   Return to clinic after MRI completion.       Judith Nguyễn MD  Knee, Shoulder, & Elbow Surgery / Sports Medicine  Specialist  Orthopaedic Surgery  [address] [place] [id number]   [email or web address]  [email or web address]  t: [phone]  o: [phone]  f: [phone]    This note was dictated using Dragon software.  While it was briefly proofread prior to completion, some grammatical, spelling, and word choice errors due to dictation may still occur.

## 2024-12-10 ENCOUNTER — HOSPITAL ENCOUNTER (OUTPATIENT)
Age: 34
Discharge: HOME | End: 2024-12-10
Payer: COMMERCIAL

## 2024-12-10 VITALS
TEMPERATURE: 98 F | OXYGEN SATURATION: 97 % | RESPIRATION RATE: 18 BRPM | HEART RATE: 86 BPM | SYSTOLIC BLOOD PRESSURE: 94 MMHG | HEIGHT: 65 IN | DIASTOLIC BLOOD PRESSURE: 65 MMHG | BODY MASS INDEX: 20.83 KG/M2 | WEIGHT: 125 LBS

## 2024-12-10 DIAGNOSIS — J22 LOWER RESPIRATORY INFECTION (E.G., BRONCHITIS, PNEUMONIA, PNEUMONITIS, PULMONITIS): Primary | ICD-10-CM

## 2024-12-10 PROCEDURE — 99213 OFFICE O/P EST LOW 20 MIN: CPT | Performed by: NURSE PRACTITIONER

## 2024-12-10 RX ORDER — ESTRADIOL 1 MG/1
TABLET ORAL
COMMUNITY
Start: 2024-11-05

## 2024-12-10 RX ORDER — PROGESTERONE 50 MG/ML
INJECTION, SOLUTION INTRAMUSCULAR
COMMUNITY
Start: 2024-11-05

## 2024-12-10 NOTE — ED INITIAL ASSESSMENT (HPI)
Pt began 1 week ago sinus congestion and mild sore throat and the past 2-3 days she began with chest tightness and congestion with cough .

## 2024-12-10 NOTE — ED PROVIDER NOTES
Patient Seen in: [facility]      History     Chief Complaint   Patient presents with    Cough/URI     Stated Complaint: sinus issues; sore, swollen throat; drainage; chest tightness    Subjective:   HPI      Patient is a 34-year-old female who is here today with complaints of 1 week of sinus congestion, for the past 2 to 3 days she reports that she has tightness to her chest when she is taking a deep breath.  Denies any chest pain.  Denies any shortness of breath.    Objective:     Past Medical History:    Acute otitis media    Anal fissure    Anxiety attack    Attention deficit disorder with hyperactivity(314.01)    Attention deficit disorder without mention of hyperactivity    Headache(784.0)    Hemorrhoids, internal, with bleeding    Knee pain    Major depressive disorder, recurrent episode, moderate (HCC)    Midepigastric pain    Migraines    Other postprocedural status(V45.89)    Overdose    Pain in joint, lower leg    Palpitations    Pharyngitis    recurrent    Rectal pain    Self-mutilation    Sinusitis    Suicidal ideation    Tobacco dependence    Unspecified sinusitis (chronic)    recurrent    URI (upper respiratory infection)    Visual impairment    GLASSES              Past Surgical History:   Procedure Laterality Date      2020    Cholecystectomy  13    by Dr. Holland @ [place]    Colposcopy, cervix w/upper adjacent vagina; w/biopsy(s), cervix  ,2018    Leep  2018    Sinus surgery    2010    bilat endo sofia bullosa and middle turbinate  hypertrophy takedown w/right endoscopic maxillary antrostom anterior ethmoidectomy and left endo balloon maxillary antrostomiesbilat endo max sinus lavage and inferior turbinate submucous resection and outfracture.                Social History     Socioeconomic History    Marital status:    Tobacco Use    Smoking status: Former     Current packs/day: 0.25     Types: Cigarettes     Passive exposure: Never    Smokeless  tobacco: Never    Tobacco comments:     quit 10 years ago and re-started 2/22   Vaping Use    Vaping status: Never Used   Substance and Sexual Activity    Alcohol use: Not Currently    Drug use: Not Currently     Types: Cannabis     Comment: last use sept 5 ,2021    Sexual activity: Yes     Partners: Male   Other Topics Concern    Caffeine Concern Yes     Comment: 1 cup of coffee 3 days per week    Exercise Yes     Comment: fitness class weekly              Review of Systems    Positive for stated complaint: sinus issues; sore, swollen throat; drainage; chest tightness  Other systems are as noted in HPI.  Constitutional and vital signs reviewed.      All other systems reviewed and negative except as noted above.    Physical Exam     ED Triage Vitals [12/10/24 0813]   BP 94/65   Pulse 86   Resp 18   Temp 98.3 °F (36.8 °C)   Temp src Oral   SpO2 97 %   O2 Device None (Room air)       Current Vitals:   Vital Signs  BP: 94/65  Pulse: 86  Resp: 18  Temp: 98.3 °F (36.8 °C)  Temp src: Oral    Oxygen Therapy  SpO2: 97 %  O2 Device: None (Room air)        Physical Exam  VS: Vital signs reviewed. O2 saturation within normal limits for this patient     General: Patient is awake and alert, oriented to person, place and time. Not in acute distress.      HEENT: Head is normocephalic atraumatic. Pupils reactive bilaterally.  EOMs intact.  No facial droop or slurred speech.  No oral pallor. Mucous membranes moist.      Neck: No cervical lymphadenopathy. No stridor. Supple. No meningsmus.      Heart: S1-S2.  Regular rate and rhythm.       Lungs: good inspiratory effort. +air entry bilaterally without wheezes, rhonchi, crackles.  No accessory muscle use or tachypnea.       Abdomen: Soft, nontender, nondistended.  Active bowel sounds present.       Back: No CVA tenderness.     Extremities: No edema.  Pulses 2+ extremities.   Brisk capillary refill noted.      Skin: Normal skin turgor     CNS: Moves all 4 extremities.  Interacts  appropriately.  No tremor.  No gait abnormality        ED Course   Labs Reviewed - No data to display         I have personally  reviewed available prior medical records for any recent pertinent discharge summaries/testing. Patient/family updated on results and plan, a verbalized understanding and agreement with the plan.  I explained to the patient that emergent conditions may arise and to go to the ER for new, worsening or any persistent conditions. I've explained the importance of taking all medicatons as prescribed, follow up, and return precuations,  All questions answered.    Please note that this report has been produced using speech recognition software and may contain errors related to that system including, but not limited to, errors in grammar, punctuation, and spelling, as well as words and phrases that possibly may have been recognized inappropriately.  If there are any questions or concerns, contact the dictating provider for clarification.       MDM      Patient presents with Nontoxic, does not meet SIRS criteria.   Patient does not have uvula deviation or unilateral tonsillar swelling to indicate tonsillar abscess. No meningsmus or trismus. No dysphagia or difficulty handing secretions. No evidence for otitis media. Patient does not have any respiratory distress.  O2 saturation within normal limits for this patient. Does not appear clinically dehydrated and is tolerating oral intake. Presentation consistent with a URI            . Encouraged patient on oral hydration and supportive care. Recommend follow up with PCP.  Return to ED precautions discussed with the patient and family.        Medical Decision Making      Disposition and Plan     Clinical Impression:  1. Lower respiratory infection (e.g., bronchitis, pneumonia, pneumonitis, pulmonitis)         Disposition:  Discharge  12/10/2024  8:28 am    Follow-up:  Marcin Clifton MD  [address] [place] [address]  [address] [place] [address]  [id number]  [phone]                Medications Prescribed:  Discharge Medication List as of 12/10/2024  8:30 AM        START taking these medications    Details   amoxicillin clavulanate 875-125 MG Oral Tab Take 1 tablet by mouth 2 (two) times daily for 10 days., Normal, Disp-20 tablet, R-0                 Supplementary Documentation:

## 2024-12-16 ENCOUNTER — APPOINTMENT (OUTPATIENT)
Dept: GENERAL RADIOLOGY | Age: 34
End: 2024-12-16
Attending: PHYSICIAN ASSISTANT
Payer: COMMERCIAL

## 2024-12-16 ENCOUNTER — HOSPITAL ENCOUNTER (OUTPATIENT)
Age: 34
Discharge: HOME | End: 2024-12-16
Payer: COMMERCIAL

## 2024-12-16 VITALS
OXYGEN SATURATION: 99 % | HEART RATE: 98 BPM | DIASTOLIC BLOOD PRESSURE: 79 MMHG | BODY MASS INDEX: 21 KG/M2 | RESPIRATION RATE: 24 BRPM | WEIGHT: 125 LBS | TEMPERATURE: 99 F | SYSTOLIC BLOOD PRESSURE: 131 MMHG

## 2024-12-16 DIAGNOSIS — J45.20 MILD INTERMITTENT REACTIVE AIRWAY DISEASE WITHOUT COMPLICATION (HCC): Primary | ICD-10-CM

## 2024-12-16 DIAGNOSIS — R05.1 ACUTE COUGH: ICD-10-CM

## 2024-12-16 PROCEDURE — 99213 OFFICE O/P EST LOW 20 MIN: CPT | Performed by: PHYSICIAN ASSISTANT

## 2024-12-16 PROCEDURE — 71046 X-RAY EXAM CHEST 2 VIEWS: CPT | Performed by: PHYSICIAN ASSISTANT

## 2024-12-16 RX ORDER — PREDNISONE 20 MG/1
40 TABLET ORAL DAILY
Qty: 10 TABLET | Refills: 0 | Status: SHIPPED | OUTPATIENT
Start: 2024-12-16 | End: 2024-12-21

## 2024-12-16 RX ORDER — CODEINE PHOSPHATE AND GUAIFENESIN 10; 100 MG/5ML; MG/5ML
5 SOLUTION ORAL EVERY 6 HOURS PRN
Qty: 120 ML | Refills: 0 | Status: SHIPPED | OUTPATIENT
Start: 2024-12-16

## 2024-12-16 RX ORDER — PREDNISONE 20 MG/1
40 TABLET ORAL ONCE
Status: COMPLETED | OUTPATIENT
Start: 2024-12-16 | End: 2024-12-16

## 2024-12-16 RX ORDER — ALBUTEROL SULFATE 90 UG/1
2 INHALANT RESPIRATORY (INHALATION) EVERY 4 HOURS PRN
Qty: 1 EACH | Refills: 0 | Status: SHIPPED | OUTPATIENT
Start: 2024-12-16 | End: 2025-01-15

## 2024-12-17 NOTE — ED PROVIDER NOTES
Patient Seen in: [facility]      History     Chief Complaint   Patient presents with    Cough/URI     Stated Complaint: Shortness of breath    Subjective:   The history is provided by the patient.         34-year-old female presents to the immediate care due to persistent dry hacking cough for the past day.  Recent was a diagnosed with sinusitis 6 days ago.  Has been taking her Augmentin at home with some improvement.  Continues to have some nasal congestion but clear.  Today began to have significant coughing mainly dry.  Denies any fevers or chest pain no shortness of breath.  She is receiving IVF foot mainly injections no lower leg swelling.  Denies any pleuritic chest pain.  Just feels like she cannot stop coughing.  Has been taking Robitussin Tessalon Perles and her son's albuterol nebulizer.  Admits the albuterol nebulizer did make her a little jittery but otherwise did not help the cough.  She has no history herself of any asthma or reactive airway.  No symptoms of systemic anaphylaxis such as angioedema throat swelling tongue swelling hives.  No new medications.    Objective:     Past Medical History:    Acute otitis media    Anal fissure    Anxiety attack    Attention deficit disorder with hyperactivity(314.01)    Attention deficit disorder without mention of hyperactivity    Headache(784.0)    Hemorrhoids, internal, with bleeding    Knee pain    Major depressive disorder, recurrent episode, moderate (HCC)    Midepigastric pain    Migraines    Other postprocedural status(V45.89)    Overdose    Pain in joint, lower leg    Palpitations    Pharyngitis    recurrent    Rectal pain    Self-mutilation    Sinusitis    Suicidal ideation    Tobacco dependence    Unspecified sinusitis (chronic)    recurrent    URI (upper respiratory infection)    Visual impairment    GLASSES              Past Surgical History:   Procedure Laterality Date      2020    Cholecystectomy  13    by  Dr. Holland @ [place]    Colposcopy, cervix w/upper adjacent vagina; w/biopsy(s), cervix  2016,2018    Leep  2018    Sinus surgery    2010    bilat endo sofia bullosa and middle turbinate  hypertrophy takedown w/right endoscopic maxillary antrostom anterior ethmoidectomy and left endo balloon maxillary antrostomiesbilat endo max sinus lavage and inferior turbinate submucous resection and outfracture.                Social History     Socioeconomic History    Marital status:    Tobacco Use    Smoking status: Former     Current packs/day: 0.25     Types: Cigarettes     Passive exposure: Never    Smokeless tobacco: Never    Tobacco comments:     quit 10 years ago and re-started 2/22   Vaping Use    Vaping status: Never Used   Substance and Sexual Activity    Alcohol use: Not Currently    Drug use: Not Currently     Types: Cannabis     Comment: last use sept 5 ,2021    Sexual activity: Yes     Partners: Male   Other Topics Concern    Caffeine Concern Yes     Comment: 1 cup of coffee 3 days per week    Exercise Yes     Comment: fitness class weekly              Review of Systems   Constitutional: Negative.    HENT:  Positive for congestion. Negative for sore throat, trouble swallowing and voice change.    Respiratory:  Positive for cough. Negative for shortness of breath, wheezing and stridor.    Cardiovascular: Negative.    Gastrointestinal: Negative.    Neurological: Negative.        Positive for stated complaint: Shortness of breath  Other systems are as noted in HPI.  Constitutional and vital signs reviewed.      All other systems reviewed and negative except as noted above.    Physical Exam     ED Triage Vitals [12/16/24 1935]   /79   Pulse 98   Resp 24   Temp 98.9 °F (37.2 °C)   Temp src Oral   SpO2 99 %   O2 Device None (Room air)       Current Vitals:   Vital Signs  BP: 131/79  Pulse: 98  Resp: 24  Temp: 98.9 °F (37.2 °C)  Temp src: Oral    Oxygen Therapy  SpO2: 99 %  O2 Device: None (Room  air)        Physical Exam  Vitals and nursing note reviewed.   Constitutional:       General: She is not in acute distress.     Appearance: Normal appearance.   HENT:      Head: Normocephalic.      Right Ear: Tympanic membrane, ear canal and external ear normal.      Left Ear: Tympanic membrane, ear canal and external ear normal.      Nose: Nose normal.      Mouth/Throat:      Mouth: Mucous membranes are moist.      Pharynx: No oropharyngeal exudate or posterior oropharyngeal erythema.      Comments: Postnasal drip  Eyes:      Extraocular Movements: Extraocular movements intact.      Conjunctiva/sclera: Conjunctivae normal.      Pupils: Pupils are equal, round, and reactive to light.   Cardiovascular:      Rate and Rhythm: Normal rate and regular rhythm.   Pulmonary:      Effort: Pulmonary effort is normal. No respiratory distress.      Breath sounds: Normal breath sounds.      Comments: Dry hacking cough is noted.  No bronchospasms.  Musculoskeletal:         General: Normal range of motion.      Cervical back: Normal range of motion.   Lymphadenopathy:      Cervical: No cervical adenopathy.   Skin:     General: Skin is warm.   Neurological:      General: No focal deficit present.      Mental Status: She is alert and oriented to person, place, and time.   Psychiatric:         Mood and Affect: Mood normal.         Behavior: Behavior normal.             ED Course   Labs Reviewed - No data to display         XR CHEST PA + LAT CHEST (CPT=71046)    Result Date: 12/16/2024  PROCEDURE:  XR CHEST PA + LAT CHEST (CPT=71046)  INDICATIONS:  Shortness of breath  COMPARISON:  [facility], XR, XR CHEST PA + LAT CHEST (CPT=71046), 8/20/2024, 11:36 AM.  [facility], XR, XR CHEST PA + LAT CHEST (CPT=71046), 4/06/2023, 10:30 AM.  TECHNIQUE:  PA and lateral chest radiographs were obtained.  PATIENT STATED HISTORY: (As transcribed by Technologist)  Pt was seen and treated at [facility] last week for cough, treated w/ Augmentin, rpt  symptom improved, however, tonight states cough got worse, did a neb treatment 1hr ago, Tessalon pearls and bolivar, rpt she is also on estrogen for IVF     FINDINGS:  Cardiac size and pulmonary vasculature are within normal limits. No pleural effusions. No pneumothorax.            CONCLUSION:  No acute pulmonary findings.   LOCATION:  [facility]   Dictated by (CST): Yobany Casey MD on 12/16/2024 at 8:14 PM     Finalized by (CST): Yobany Casey MD on 12/16/2024 at 8:15 PM                MDM   Ddx -reactive airway, viral bronchitis, pneumonia    On exam the patient is afebrile nontoxic.  She is in no acute distress but a dry hacking cough is noted.  No true bronchospasms.  No wheezing.  HEENT exam shows mild postnasal drip otherwise unremarkable.  Chest x-ray was performed showing no acute findings.  Already did a nebulizer at home without relief no wheezing here and the cough is more dry and hacking.  She was given a saline neb May with slightly decrease in cough along with prednisone.  Will prescribe medication with codeine and if symptoms progress or worsen to report to the emergency department.        Medical Decision Making  Problems Addressed:  Acute cough: acute illness or injury  Mild intermittent reactive airway disease without complication (HCC): acute illness or injury    Amount and/or Complexity of Data Reviewed  Radiology: ordered and independent interpretation performed. Decision-making details documented in ED Course.    Risk  OTC drugs.  Prescription drug management.        Disposition and Plan     Clinical Impression:  1. Mild intermittent reactive airway disease without complication (HCC)    2. Acute cough         Disposition:  Discharge  12/16/2024  8:35 pm    Follow-up:  No follow-up provider specified.        Medications Prescribed:  Current Discharge Medication List        START taking these medications    Details   predniSONE 20 MG Oral Tab Take 2 tablets (40 mg total) by mouth daily for  5 days.  Qty: 10 tablet, Refills: 0    Associated Diagnoses: Acute cough; Mild intermittent reactive airway disease without complication (HCC)      albuterol 108 (90 Base) MCG/ACT Inhalation Aero Soln Inhale 2 puffs into the lungs every 4 (four) hours as needed for Wheezing.  Qty: 1 each, Refills: 0    Associated Diagnoses: Acute cough; Mild intermittent reactive airway disease without complication (HCC)      guaiFENesin-codeine 100-10 MG/5ML Oral Solution Take 5 mL by mouth every 6 (six) hours as needed for cough.  Qty: 120 mL, Refills: 0    Associated Diagnoses: Acute cough; Mild intermittent reactive airway disease without complication (HCC)                 Supplementary Documentation:

## 2024-12-17 NOTE — DISCHARGE INSTRUCTIONS
Continue to stay hydrated.  Tablespoon of honey in the back of the throat can sometimes help with bronchospasms  You can use albuterol inhaler every 4-6 hours as needed  Prednisone daily until gone  Can use Cheratussin as needed for coughing this will make you drowsy do not drive or any operating heavy machinery    otherwise can continue to take Tessalon Perles  Close follow-up with your primary care doctor  If symptoms progress or worsen please report to the emergency department

## 2024-12-17 NOTE — ED INITIAL ASSESSMENT (HPI)
Pt was seen and treated at [facility] last week for cough, treated w/ Augmentin, rpt symptom improved, however, tonight states cough got worse, did a neb treatment 1hr ago, Tessalon pearls and robatussin, rpt she is also on estrogen for IVF

## 2025-02-19 ENCOUNTER — TELEPHONE (OUTPATIENT)
Facility: CLINIC | Age: 35
End: 2025-02-19

## 2025-02-19 NOTE — TELEPHONE ENCOUNTER
Spoke with patient to schedule annual exam, she is 6 days post IVF transfer, will call us for care

## 2025-04-21 PROBLEM — R63.2 BINGE EATING: Status: RESOLVED | Noted: 2022-08-16 | Resolved: 2025-04-21

## 2025-04-21 PROBLEM — E66.3 OVERWEIGHT (BMI 25.0-29.9): Status: RESOLVED | Noted: 2022-08-16 | Resolved: 2025-04-21

## 2025-04-22 ENCOUNTER — OFFICE VISIT (OUTPATIENT)
Facility: CLINIC | Age: 35
End: 2025-04-22
Payer: COMMERCIAL

## 2025-04-22 VITALS — BODY MASS INDEX: 22 KG/M2 | WEIGHT: 133 LBS | SYSTOLIC BLOOD PRESSURE: 108 MMHG | DIASTOLIC BLOOD PRESSURE: 76 MMHG

## 2025-04-22 DIAGNOSIS — Z01.419 WELL WOMAN EXAM WITH ROUTINE GYNECOLOGICAL EXAM: Primary | ICD-10-CM

## 2025-04-22 DIAGNOSIS — Z12.4 SCREENING FOR CERVICAL CANCER: ICD-10-CM

## 2025-04-22 DIAGNOSIS — Z31.83 PATIENT UNDERGOING IN VITRO FERTILIZATION: ICD-10-CM

## 2025-04-22 DIAGNOSIS — Z98.890 H/O LEEP: ICD-10-CM

## 2025-04-22 DIAGNOSIS — Z11.3 SCREEN FOR STD (SEXUALLY TRANSMITTED DISEASE): ICD-10-CM

## 2025-04-22 PROCEDURE — 87491 CHLMYD TRACH DNA AMP PROBE: CPT

## 2025-04-22 PROCEDURE — 87624 HPV HI-RISK TYP POOLED RSLT: CPT

## 2025-04-22 PROCEDURE — 3078F DIAST BP <80 MM HG: CPT

## 2025-04-22 PROCEDURE — 99395 PREV VISIT EST AGE 18-39: CPT

## 2025-04-22 PROCEDURE — 87591 N.GONORRHOEAE DNA AMP PROB: CPT

## 2025-04-22 PROCEDURE — 88175 CYTOPATH C/V AUTO FLUID REDO: CPT

## 2025-04-22 PROCEDURE — 3074F SYST BP LT 130 MM HG: CPT

## 2025-04-22 NOTE — PROGRESS NOTES
GYN H&P     Genetic questionnaire reviewed with the patient and she will be referred for genetic counseling if the questionnaire had any positive results.    The [facility] intake form was also reviewed regarding contraception, menstrual periods, urinary health, and vaginal / sexual health    2025  10:53 AM    Chief Complaint   Patient presents with    Gynecologic Exam     Annual    Testing     For IVF she needs a pap smear/gc as well, breast exam, pelvic exam, and Micro plasma, ureaplasma cervical culture. Infectious disease screening blood work.       HPI: Lisbeth is a 35 year old  Patient's last menstrual period was 2025 (exact date).  (contraception: n/a - undergoing IVF treatment) here for her annual gyn exam.     She has no complaints. Menses are regular, lasting 4-5 days in length. Denies any pelvic or breast complaints. Currently undergoing IVF treatments - had a chemical pregnancy and then a failed transfer most recently. Is going to be undergoing more testing and has 1 embryo left.    Previous encounters and chart reviewed.     OB History    Para Term  AB Living   1 1 1 0 0 1   SAB IAB Ectopic Multiple Live Births   0 0 0 0 1      # Outcome Date GA Lbr Hugh/2nd Weight Sex Type Anes PTL Lv   1 Term 20 38w5d 14:24 / 02:05 7 lb 0.2 oz (3.18 kg) M Caesarean EPI N AVANI      Complications: Variable decelerations       GYN hx:   Menarche: 11  Period Cycle (Days): 25-30  Period Duration (Days): 4-5  Period Flow: normal  Use of Birth Control (if yes, specify type): None  Hx Prior Abnormal Pap: Yes  Pap Date: 24  Pap Result Notes: neg/neg; 2023: neg/neg 21 Neg/Neg; 2020 Neg/Neg; 2019 neg/neg; 2018 ASCUS,pos 16,18/45 neg  (2017 neg,pos 16+,18 neg)  Follow Up Recommendation: desires pap today      Past Medical History[1]  Past Surgical History[2]  Allergies[3]  Medications Ordered Prior to Encounter[4]  Family  History[5]  Social History     Socioeconomic History    Marital status:      Spouse name: Not on file    Number of children: Not on file    Years of education: Not on file    Highest education level: Not on file   Occupational History    Not on file   Tobacco Use    Smoking status: Former     Current packs/day: 0.25     Types: Cigarettes     Passive exposure: Never    Smokeless tobacco: Never    Tobacco comments:     quit 10 years ago and re-started 2/22   Vaping Use    Vaping status: Never Used   Substance and Sexual Activity    Alcohol use: Not Currently    Drug use: Not Currently     Types: Cannabis     Comment: last use sept 5 ,2021    Sexual activity: Yes     Partners: Male   Other Topics Concern     Service Not Asked    Blood Transfusions Not Asked    Caffeine Concern Yes     Comment: 1 cup of coffee 3 days per week    Occupational Exposure Not Asked    Hobby Hazards Not Asked    Sleep Concern Not Asked    Stress Concern Not Asked    Weight Concern Not Asked    Special Diet Not Asked    Back Care Not Asked    Exercise Yes     Comment: fitness class weekly    Bike Helmet Not Asked    Seat Belt Not Asked    Self-Exams Not Asked   Social History Narrative    Not on file     Social Drivers of Health     Food Insecurity: No Food Insecurity (4/22/2025)    NCSS - Food Insecurity     Worried About Running Out of Food in the Last Year: No     Ran Out of Food in the Last Year: No   Transportation Needs: No Transportation Needs (4/22/2025)    NCSS - Transportation     Lack of Transportation: No   Stress: Not on file   Housing Stability: Not At Risk (4/22/2025)    NCSS - Housing/Utilities     Has Housing: Yes     Worried About Losing Housing: No     Unable to Get Utilities: No       ROS:     Review of Systems:  General: denies fevers, chills, fatigue and malaise.   Eyes: no visual changes, denies headaches  ENT: no complaints, denies earaches, runny nose, epistaxis, throat pain or sore throat  Respiratory:  denies SOB, dyspnea, cough or wheezing  Cardiovascular: denies chest pain, palpitations, exercise intolerance   GI: denies abdominal pain, diarrhea, constipation  : no complaints, denies dysuria, increased urinary frequency. Menses regular, no dysmenorrhea, no menorrhagia, no dyspareunia   Hematological/lymphatic: denies history of excessive bleeding or bruising, denies dizziness, lightheadedness.   Breast: denies rashes, skin changes, pain, lumps or discharge   Psychiatric: denies depression, changes in sleep patterns, anxiety  Endocrine: denies hot or cold intolerance, mood changes   Neurological: denies changes in sight, smell, hearing or taste. Denies seizures or tremors  Immunological: denies allergies, denies anaphylaxis, or swollen lymph nodes  Musculoskeletal: denies joint pain, morning stiffness, decreased range of motion         O /76   Wt 133 lb (60.3 kg)   LMP 04/02/2025 (Exact Date)   BMI 22.13 kg/m²         Wt Readings from Last 6 Encounters:   04/22/25 133 lb (60.3 kg)   04/21/25 130 lb 8 oz (59.2 kg)   12/16/24 125 lb (56.7 kg)   12/10/24 125 lb (56.7 kg)   07/28/24 125 lb (56.7 kg)   04/20/24 120 lb (54.4 kg)     Exam:   GENERAL: well developed, well nourished, in no apparent distress, oriented.  SKIN: no rashes, no suspicious lesions  HEENT: normal  NECK: supple; no thyromegaly, no adenopathy  LUNGS: clear to auscultation  CARDIOVASCULAR: normal S1, S2, RRR  BREASTS: soft, nontender, no palpable masses or nodes, no nipple discharge, no skin changes, no axillary adenopathy  ABDOMEN: low pfannenstiel c/s scars,  soft, non distended; non tender, no masses  PELVIC: External Genitalia: Normal appearing, no lesions.    Vagina: normal pink mucosa, no lesions, normal clear discharge.    Bladder well supported.  No  anterior or posterior hernias    Cervix: multiparous, no lesions , No CMT     Uterus: RVRF, mobile, non tender, normal size    Adnexa: non tender, no masses, normal size    Rectal:  deferred  EXTREMITIES:  non tender without edema             Patient counseled on:    Diet/exercise.      Self Breast Exams     Safe sex practices / and living environment     Vaccines:  Annual Flu          Pap: neg/neg - Year:  3/2024, collected today per IVF requirements  GC/Chlamydia:  collected via pap  Mammogram:  n/a   Dexa:  n/a  Colonoscopy / Cologuard:   n/a  Lipid / Cholesterol:  ordered per PCP     A/P: Patient is 35 year old female with no complaints. Here for well woman exam.     Meds This Visit:    Requested Prescriptions      No prescriptions requested or ordered in this encounter       1. Well woman exam with routine gynecological exam    2. Patient undergoing in vitro fertilization    3. Screen for STD (sexually transmitted disease)  - Chlamydia/Gc Amplification; Future  - HIV Ag/Ab Combo; Future  - T Pallidum Screening Cascade; Future  - HCV Antibody; Future  - Hepatitis B Surface Antigen; Future  - Chlamydia/Gc Amplification    4. Screening for cervical cancer  - ThinPrep PAP Smear; Future  - Hpv High Risk , Thin Prep Collection; Future  - ThinPrep PAP Smear  - Hpv High Risk , Thin Prep Collection    5. H/O LEEP 2018 STEVE 2, +hpv    Pap collected and labwork ordered for IVF treatments    Return in about 1 year (around 4/22/2026) for Well Woman Exam.    Tamara Zepeda, APRN   4/22/2025  10:53 AM       This note was created by [employer] voice recognition. Errors in content may be related to improper recognition by the system; efforts to review and correct have been done but errors may still exist. Please contact me with any questions.    Note to patient and family   The 21st Century Cures Act makes medical notes available to patients in the interest of transparency.  However, please be advised that this is a medical document.  It is intended as ospu-eo-luow communication.  It is written in medical language which may contain abbreviations or verbiage that are technical and unfamiliar.  It may appear  blunt or direct.  Medical documents are intended to carry relevant information, facts as evident, and the clinical opinion of the practitioner.           [1]   Past Medical History:   Acute otitis media    Anal fissure    Anxiety attack    Attention deficit disorder with hyperactivity(314.01)    Attention deficit disorder without mention of hyperactivity    Headache(784.0)    Hemorrhoids, internal, with bleeding    Knee pain    Major depressive disorder, recurrent episode, moderate (HCC)    Midepigastric pain    Migraines    Other postprocedural status(V45.89)    Overdose    Pain in joint, lower leg    Palpitations    Pharyngitis    recurrent    Rectal pain    Self-mutilation    Sinusitis    Suicidal ideation    Tobacco dependence    Unspecified sinusitis (chronic)    recurrent    URI (upper respiratory infection)    Visual impairment    GLASSES   [2]   Past Surgical History:  Procedure Laterality Date      2020    Cholecystectomy  13    by Dr. Holland @ [facility]    Colposcopy, cervix w/upper adjacent vagina; w/biopsy(s), cervix  ,    [facility]ep  2018    Sinus surgery        bilat endo sofia bullosa and middle turbinate  hypertrophy takedown w/right endoscopic maxillary antrostom anterior ethmoidectomy and left endo balloon maxillary antrostomiesbilat endo max sinus lavage and inferior turbinate submucous resection and outfracture.   [3]   Allergies  Allergen Reactions    Cefadroxil HIVES     hives  hives    Bactrim [Sulfamethoxazole W/Trimethoprim] JITTERY     Hot, sweaty    Duricef [Cefadroxil Monohydrate]      hives    Levaquin [Levofloxacin] OTHER (SEE COMMENTS)     Severe tendonitis   [4]   Current Outpatient Medications on File Prior to Visit   Medication Sig Dispense Refill    ALPRAZolam 0.5 MG Oral Tab Take 1 tablet (0.5 mg total) by mouth 3 (three) times daily as needed. 30 tablet 2    [START ON 2025] lamoTRIgine (LAMICTAL) 200 MG Oral Tab Take 0.5 tablets (100 mg total) by mouth  2 (two) times daily.      FLUOXETINE HCL 40 MG Oral Cap TAKE 1 CAPSULE (40 MG TOTAL) BY MOUTH EVERY MORNING. 90 capsule 0    lisdexamfetamine (VYVANSE) 50 MG Oral Cap Take 1 capsule (50 mg total) by mouth daily. 30 capsule 0    [START ON 5/20/2025] lisdexamfetamine (VYVANSE) 50 MG Oral Cap Take 1 capsule (50 mg total) by mouth daily. 30 capsule 0    buPROPion ER (WELLBUTRIN XL) 150 MG Oral Tablet 24 Hr Take 1 tablet (150 mg total) by mouth every morning. 90 tablet 0    temazepam 30 MG Oral Cap Take 1 capsule (30 mg total) by mouth nightly as needed for Sleep. 30 capsule 2    traZODone 50 MG Oral Tab Take 1-3 tablets by mouth as needed for sleep. 90 tablet 2    progesterone oil 50 MG/ML Intramuscular Oil       levothyroxine 25 MCG Oral Tab Take 25 mcg by mouth before breakfast.      estradiol 1 MG Oral Tab  (Patient not taking: Reported on 4/22/2025)       No current facility-administered medications on file prior to visit.   [5]   Family History  Problem Relation Age of Onset    Heart Disorder Father     Lipids Father     Pulmonary Disease Father     Hypertension Father     Other (Other) Father     Depression Mother     Other (mother was adopted) Mother     Cancer Maternal Grandmother         kidney cancer    Cancer Paternal Grandmother         uterine cancer    Other (Other) Sister         cerebral palsy

## 2025-04-23 ENCOUNTER — HOSPITAL ENCOUNTER (OUTPATIENT)
Dept: GENERAL RADIOLOGY | Age: 35
Discharge: HOME | End: 2025-04-23
Attending: INTERNAL MEDICINE
Payer: COMMERCIAL

## 2025-04-23 DIAGNOSIS — M79.642 LEFT HAND PAIN: ICD-10-CM

## 2025-04-23 LAB
C TRACH DNA SPEC QL NAA+PROBE: NEGATIVE
HPV E6+E7 MRNA CVX QL NAA+PROBE: NEGATIVE
N GONORRHOEA DNA SPEC QL NAA+PROBE: NEGATIVE

## 2025-04-23 PROCEDURE — 73130 X-RAY EXAM OF HAND: CPT | Performed by: INTERNAL MEDICINE

## 2025-05-02 ENCOUNTER — HOSPITAL ENCOUNTER (OUTPATIENT)
Age: 35
Discharge: HOME | End: 2025-05-02
Payer: COMMERCIAL

## 2025-05-02 VITALS
SYSTOLIC BLOOD PRESSURE: 107 MMHG | HEART RATE: 84 BPM | RESPIRATION RATE: 17 BRPM | DIASTOLIC BLOOD PRESSURE: 75 MMHG | TEMPERATURE: 98 F | OXYGEN SATURATION: 98 %

## 2025-05-02 DIAGNOSIS — J01.41 ACUTE RECURRENT PANSINUSITIS: Primary | ICD-10-CM

## 2025-05-02 NOTE — DISCHARGE INSTRUCTIONS
Please take Augmentin as prescribed.  I recommend over-the-counter antihistamines and Flonase.  If symptoms do not resolve please follow-up closely with your ENT.

## 2025-05-02 NOTE — ED INITIAL ASSESSMENT (HPI)
Patient reports sinus pressure PND, sore throat,  yellow nasal drainage and sme with cough for greater than 10 dys No fever. \" Sinus infections in past\"

## 2025-05-02 NOTE — ED PROVIDER NOTES
Patient Seen in: [facility]      History   No chief complaint on file.    Stated Complaint: Sinus infection; sore throat    Subjective:   This is a 35-year-old female with the below stated medical history.  Presents to immediate care complaining of URI symptoms.  Reports sinus pain and pressure, nasal congestion, headache, voice change, sore throat, and postnasal drip.  Symptoms are going for the last 10 to 11 days.  Feels like previous sinus infections.  She has been taking Mucinex with no relief.  No fevers.    The history is provided by the patient.         History of Present Illness               Objective:     Past Medical History:    Acute otitis media    Anal fissure    Anxiety attack    Attention deficit disorder with hyperactivity(314.01)    Attention deficit disorder without mention of hyperactivity    Headache(784.0)    Hemorrhoids, internal, with bleeding    Knee pain    Major depressive disorder, recurrent episode, moderate (HCC)    Midepigastric pain    Migraines    Other postprocedural status(V45.89)    Overdose    Pain in joint, lower leg    Palpitations    Pharyngitis    recurrent    Rectal pain    Self-mutilation    Sinusitis    Suicidal ideation    Tobacco dependence    Unspecified sinusitis (chronic)    recurrent    URI (upper respiratory infection)    Visual impairment    GLASSES              Past Surgical History:   Procedure Laterality Date      2020    Cholecystectomy  13    by Dr. Holland @ [facility]    Colposcopy, cervix w/upper adjacent vagina; w/biopsy(s), cervix  ,    Leep  2018    Sinus surgery        bilat endo sofia bullosa and middle turbinate  hypertrophy takedown w/right endoscopic maxillary antrostom anterior ethmoidectomy and left endo balloon maxillary antrostomiesbilat endo max sinus lavage and inferior turbinate submucous resection and outfracture.                Social History     Socioeconomic History    Marital status:     Tobacco Use    Smoking status: Former     Current packs/day: 0.25     Types: Cigarettes     Passive exposure: Never    Smokeless tobacco: Never    Tobacco comments:     quit 10 years ago and re-started 2/22   Vaping Use    Vaping status: Never Used   Substance and Sexual Activity    Alcohol use: Not Currently    Drug use: Not Currently     Types: Cannabis     Comment: last use sept 5 ,2021    Sexual activity: Yes     Partners: Male   Other Topics Concern    Caffeine Concern Yes     Comment: 1 cup of coffee 3 days per week    Exercise Yes     Comment: fitness class weekly     Social Drivers of Health     Food Insecurity: No Food Insecurity (4/22/2025)    NCSS - Food Insecurity     Worried About Running Out of Food in the Last Year: No     Ran Out of Food in the Last Year: No   Transportation Needs: No Transportation Needs (4/22/2025)    NCSS - Transportation     Lack of Transportation: No   Housing Stability: Not At Risk (4/22/2025)    NCSS - Housing/Utilities     Has Housing: Yes     Worried About Losing Housing: No     Unable to Get Utilities: No              Review of Systems   Constitutional:  Negative for chills and fever.   HENT:  Positive for congestion and sore throat. Negative for dental problem, ear discharge, ear pain, trouble swallowing and voice change.    Respiratory:  Negative for cough, shortness of breath and wheezing.    Cardiovascular:  Negative for chest pain, palpitations and leg swelling.   Gastrointestinal:  Negative for abdominal pain, constipation, diarrhea, nausea and vomiting.   Genitourinary:  Negative for dysuria.   Musculoskeletal:  Negative for back pain, neck pain and neck stiffness.   Skin:  Negative for rash.   Neurological:  Positive for headaches. Negative for dizziness.       Positive for stated complaint: Sinus infection; sore throat  Other systems are as noted in HPI.  Constitutional and vital signs reviewed.      All other systems reviewed and negative except as noted  above.                  Physical Exam     ED Triage Vitals [05/02/25 1652]   /75   Pulse 84   Resp 17   Temp 98.4 °F (36.9 °C)   Temp src Oral   SpO2 98 %   O2 Device None (Room air)       Current Vitals:   Vital Signs  BP: 107/75  Pulse: 84  Resp: 17  Temp: 98.4 °F (36.9 °C)  Temp src: Oral    Oxygen Therapy  SpO2: 98 %  O2 Device: None (Room air)        Physical Exam  Vitals and nursing note reviewed.   Constitutional:       General: She is not in acute distress.     Appearance: Normal appearance. She is normal weight. She is not ill-appearing, toxic-appearing or diaphoretic.   HENT:      Head: Normocephalic and atraumatic.      Right Ear: Tympanic membrane, ear canal and external ear normal. There is no impacted cerumen.      Left Ear: Tympanic membrane, ear canal and external ear normal. There is no impacted cerumen.      Nose: Congestion and rhinorrhea present.      Mouth/Throat:      Mouth: Mucous membranes are moist.      Pharynx: Oropharynx is clear. Posterior oropharyngeal erythema present. No oropharyngeal exudate.   Eyes:      General:         Right eye: No discharge.         Left eye: No discharge.      Extraocular Movements: Extraocular movements intact.      Conjunctiva/sclera: Conjunctivae normal.   Cardiovascular:      Rate and Rhythm: Normal rate.      Heart sounds: Normal heart sounds. No murmur heard.  Pulmonary:      Effort: Pulmonary effort is normal. No respiratory distress.      Breath sounds: Normal breath sounds. No stridor. No wheezing, rhonchi or rales.   Musculoskeletal:      Cervical back: Neck supple.      Right lower leg: No edema.      Left lower leg: No edema.   Skin:     General: Skin is warm and dry.      Capillary Refill: Capillary refill takes less than 2 seconds.      Findings: No rash.   Neurological:      Mental Status: She is alert and oriented to person, place, and time.   Psychiatric:         Mood and Affect: Mood normal.         Behavior: Behavior normal.            Physical Exam                ED Course   Labs Reviewed - No data to display       Results            DC home               MDM      Vital signs stable. Patient is well-appearing and nontoxic looking. Patient presents to immediate care for sinus pain and pressure, nasal congestion, sore throat, and headache.     Differential diagnosis include bacterial sinusitis, viral sinusitis, COVID, strep, allergic rhinitis, less likely PTA    Out of the window for viral testing.  Posterior pharynx is erythemic with no evidence of exudates or PTA.    Exam is consistent with sinusitis.    DC home. Course of Augmentin prescribed. Recommended over-the-counter antihistamines and Flonase. Nasal saline rinses. Follow-up with PCP in 1 week. Reasons to seek emergent treatment reinforced. Patient verbalized understanding, and agreed with plan of care. All questions answered.          Medical Decision Making      Disposition and Plan     Clinical Impression:  1. Acute recurrent pansinusitis         Disposition:  Discharge  5/2/2025  4:59 pm    Follow-up:  Your ENT    In 1 week            Medications Prescribed:  Current Discharge Medication List        START taking these medications    Details   amoxicillin clavulanate 875-125 MG Oral Tab Take 1 tablet by mouth 2 (two) times daily for 10 days.  Qty: 20 tablet, Refills: 0             Supplementary Documentation:

## 2025-06-04 ENCOUNTER — TELEPHONE (OUTPATIENT)
Facility: CLINIC | Age: 35
End: 2025-06-04

## 2025-06-04 NOTE — TELEPHONE ENCOUNTER
[place].    Future Appointments   Date Time Provider Department Center   6/9/2025 10:10 AM [facility] Byr[id number]   6/11/2025  9:00 AM Payal Mock APRN [facility]     Please advise if imaging is needed.

## 2025-06-08 NOTE — ED INITIAL ASSESSMENT (HPI)
Low grade fever,nasal congestion & non productive cough x 1 week, Minimal relief w OTC Nyquil.   Negative home covid test. resilient/elastic

## 2025-06-09 ENCOUNTER — OFFICE VISIT (OUTPATIENT)
Dept: ORTHOPEDICS CLINIC | Facility: CLINIC | Age: 35
End: 2025-06-09
Payer: COMMERCIAL

## 2025-06-09 VITALS — BODY MASS INDEX: 21.66 KG/M2 | HEIGHT: 65 IN | WEIGHT: 130 LBS

## 2025-06-09 DIAGNOSIS — R29.898 DECREASED GRIP STRENGTH OF LEFT HAND: Primary | ICD-10-CM

## 2025-06-09 DIAGNOSIS — M79.642 LEFT HAND PAIN: ICD-10-CM

## 2025-06-09 PROCEDURE — 3008F BODY MASS INDEX DOCD: CPT | Performed by: PHYSICIAN ASSISTANT

## 2025-06-09 PROCEDURE — 99214 OFFICE O/P EST MOD 30 MIN: CPT | Performed by: PHYSICIAN ASSISTANT

## 2025-06-09 NOTE — H&P
[facility] - ORTHOPEDICS  [address] [place] [id number]  [phone]     NEW PATIENT VISIT - HISTORY AND PHYSICAL EXAMINATION     Name: Lisbeth Bowens   MRN: [id number]  Date: 6/9/2025     CC: Left hand/wrist pain.     REFERRED BY: Marcin Clifton MD    HPI:   Lisbeth Bowens is a very pleasant 35 year old right-hand dominant female who presents today for evaluation, consultation, and management of left hand pain with difficulty in typing and  strength.  The patient has 3-4 out of 10 pain and complains of stiffness, weakness and feelings of instability.  This has been ongoing for approximately 2 months and the patient is reports 3-4 out of 10 pain.     PMH:   Past Medical History[1]    PAST SURGICAL HX:  Past Surgical History[2]    FAMILY HX:  Family History[3]    ALLERGIES:  Cefadroxil, Bactrim [sulfamethoxazole w/trimethoprim], Duricef [cefadroxil monohydrate], and Levaquin [levofloxacin]    MEDICATIONS: Current Medications[4]    ROS: A complete 10 point review of systems performed and negative aside from the aforementioned per history of present illness.     SOCIAL HX:  Social History     Occupational History    Not on file   Tobacco Use    Smoking status: Former     Current packs/day: 0.25     Types: Cigarettes     Passive exposure: Never    Smokeless tobacco: Never    Tobacco comments:     quit 10 years ago and re-started 2/22   Vaping Use    Vaping status: Never Used   Substance and Sexual Activity    Alcohol use: Not Currently    Drug use: Not Currently     Types: Cannabis     Comment: last use sept 5 ,2021    Sexual activity: Yes     Partners: Male         PE:   Vitals:    06/09/25 1014   Weight: 130 lb (59 kg)   Height: 5' 5\" (1.651 m)     Estimated body mass index is 21.63 kg/m² as calculated from the following:    Height as of this encounter: 5' 5\" (1.651 m).    Weight as of this encounter: 130 lb (59 kg).    Physical Exam  Constitutional:       Appearance:  Normal appearance.   HENT:      Head: Normocephalic and atraumatic.   Eyes:      Extraocular Movements: Extraocular movements intact.   Neck:      Musculoskeletal: Normal range of motion and neck supple.   Cardiovascular:      Pulses: Normal pulses.   Pulmonary:      Effort: Pulmonary effort is normal. No respiratory distress.   Abdominal:      General: There is no distension.   Skin:     General: Skin is warm.      Capillary Refill: Capillary refill takes less than 2 seconds.      Findings: No bruising.   Neurological:      General: No focal deficit present.      Mental Status: Alert.   Psychiatric:         Mood and Affect: Mood normal.     Examination of the left hand/wrist demonstrates:     Skin is intact, warm and dry.     Inspection:   Atrophy: none    Effusion: none    Edema: none    Erythema: none    Hematoma: none    Nail changes:  none    Deformities: none      Palpation:   Radius: none    Ulna: none    Scaphoid: none    Lunate:  none    Triquetrum: none    Pisiform: none    Trapezium: none    Trapezoid: none    Capitate: none  Hamate: none   DRUJ: none   Dorsal Wrist: none   Torres Wrist: none   Metacarpals none   DIP: none   PIP: none     ROM:   Wrist: Full and symmetric   Fingers: Full and symmetric     Strength: significantly weak     Special Tests:   Median Nerve: Negative  Ulnar Nerve: Negative   Radial Nerve:  Negative     No obvious peripheral edema noted.   Distal neurovascular exam demonstrates normal perfusion, intact sensation to light touch and full strength.     Examination of the contralateral hand/wrist demonstrates:  No significant atrophy, swelling or effusion. Full range of motion. Neurovascularly intact distally.      Radiographic Examination/Diagnostics:    I personally viewed, independently interpreted and radiology report was reviewed.    X-ray, 6/9/2025-   Narrative   PROCEDURE:  XR HAND (MIN 3 VIEWS), LEFT (CPT=73130)     TECHNIQUE:  Three views of the left hand were obtained.      COMPARISON:  None.     INDICATIONS:  M79.642 Left hand pain     PATIENT STATED HISTORY: (As transcribed by Technologist)  Left hand pain when making a fist. Pain starts at the MCP joints and radiates to the digits. Symptoms for 6 weeks.         FINDINGS:  Normal joint spaces.  No fracture, expansile lesion or erosion.  No osteophytes.  No abnormal soft tissue calcifications.                            Impression   CONCLUSION:  No abnormality demonstrated.  Continued clinical correlation recommended.       IMPRESSION: Lisbeth Bowens is a 35 year old Right hand dominant female with left hand pain,  weakness.     PLAN:   We had a detailed discussion outlining the etiology, anatomy, pathophysiology, and natural history of the patient's findings. Imaging was reviewed in detail and correlated to a 3-dimensional model of the patient's pathology.     We reviewed the treatment of this disease condition.  I do not believe she requires orthopedic surgery intervention. At this time I recommend an EMG/NCS and referral for Rheumatology to evaluate the etiology of her symptoms.           Sincer NISSA Byrnes, [facility], PA-C Orthopedic Surgery / Sports Medicine Specialist  [facility]  [address] [place], [place] [email or web address]  [email or web address]  t: [phone]  o: [phone]  f: [phone]    This note was dictated using Dragon software.  While it was briefly proofread prior to completion, some grammatical, spelling, and word choice errors due to dictation may still occur.           [1]   Past Medical History:   Acute otitis media    Anal fissure    Anxiety attack    Attention deficit disorder with hyperactivity(314.01)    Attention deficit disorder without mention of hyperactivity    Headache(784.0)    Hemorrhoids, internal, with bleeding    Knee pain    Major depressive disorder, recurrent episode, moderate (HCC)    Midepigastric pain    Migraines    Other postprocedural  status(V45.89)    Overdose    Pain in joint, lower leg    Palpitations    Pharyngitis    recurrent    Rectal pain    Self-mutilation    Sinusitis    Suicidal ideation    Tobacco dependence    Unspecified sinusitis (chronic)    recurrent    URI (upper respiratory infection)    Visual impairment    GLASSES   [2]   Past Surgical History:  Procedure Laterality Date      2020    Cholecystectomy  13    by Dr. Holland @ [place]    Colposcopy, cervix w/upper adjacent vagina; w/biopsy(s), cervix  ,2018    [facility]ep  2018    Sinus surgery        bilat endo sofia bullosa and middle turbinate  hypertrophy takedown w/right endoscopic maxillary antrostom anterior ethmoidectomy and left endo balloon maxillary antrostomiesbilat endo max sinus lavage and inferior turbinate submucous resection and outfracture.   [3]   Family History  Problem Relation Age of Onset    Heart Disorder Father     Lipids Father     Pulmonary Disease Father     Hypertension Father     Other (Other) Father     Depression Mother     Other (mother was adopted) Mother     Cancer Maternal Grandmother         kidney cancer    Cancer Paternal Grandmother         uterine cancer    Other (Other) Sister         cerebral palsy   [4]   Current Outpatient Medications   Medication Sig Dispense Refill    BUPROPION  MG Oral Tablet 24 Hr TAKE 1 TABLET BY MOUTH EVERY DAY IN THE MORNING 90 tablet 0    TEMAZEPAM 30 MG Oral Cap TAKE 1 CAPSULE BY MOUTH NIGHTLY AS NEEDED FOR SLEEP. 30 capsule 2    traZODone 50 MG Oral Tab Take 1-3 tablets ( mg total) by mouth nightly as needed for Sleep. 270 tablet 0    ALPRAZolam 0.5 MG Oral Tab Take 1 tablet (0.5 mg total) by mouth 3 (three) times daily as needed. 30 tablet 2    lamoTRIgine (LAMICTAL) 200 MG Oral Tab Take 0.5 tablets (100 mg total) by mouth 2 (two) times daily.      FLUOXETINE HCL 40 MG Oral Cap TAKE 1 CAPSULE (40 MG TOTAL) BY MOUTH EVERY MORNING. 90 capsule 0    lisdexamfetamine (VYVANSE) 50 MG  Oral Cap Take 1 capsule (50 mg total) by mouth daily. 30 capsule 0    estradiol 1 MG Oral Tab       progesterone oil 50 MG/ML Intramuscular Oil       levothyroxine 25 MCG Oral Tab Take 25 mcg by mouth before breakfast.

## 2025-06-12 ENCOUNTER — OFFICE VISIT (OUTPATIENT)
Age: 35
End: 2025-06-12
Payer: COMMERCIAL

## 2025-06-12 VITALS
SYSTOLIC BLOOD PRESSURE: 103 MMHG | DIASTOLIC BLOOD PRESSURE: 72 MMHG | RESPIRATION RATE: 16 BRPM | BODY MASS INDEX: 21.66 KG/M2 | HEIGHT: 65 IN | HEART RATE: 98 BPM | WEIGHT: 130 LBS

## 2025-06-12 DIAGNOSIS — G62.9 NEUROPATHY: ICD-10-CM

## 2025-06-12 DIAGNOSIS — M79.642 LEFT HAND PAIN: Primary | ICD-10-CM

## 2025-06-12 PROCEDURE — 3074F SYST BP LT 130 MM HG: CPT | Performed by: INTERNAL MEDICINE

## 2025-06-12 PROCEDURE — 3008F BODY MASS INDEX DOCD: CPT | Performed by: INTERNAL MEDICINE

## 2025-06-12 PROCEDURE — 99214 OFFICE O/P EST MOD 30 MIN: CPT | Performed by: INTERNAL MEDICINE

## 2025-06-12 PROCEDURE — 3078F DIAST BP <80 MM HG: CPT | Performed by: INTERNAL MEDICINE

## 2025-06-12 NOTE — PROGRESS NOTES
RHEUMATOLOGY CLINIC- NEW PATIENT    Lisbeth Bowens is a 35 year old female.    ASSESSMENT/PLAN:       ICD-10-CM    1. Left hand pain  M79.642 Cyclic Citrullinate Pep. IGG     Rheumatoid Arthritis Factor      2. Neuropathy  G62.9 Cyclic Citrullinate Pep. IGG     Rheumatoid Arthritis Factor        DISCUSSION:  Patient presents as a new outpatient referral from orthopedics for evaluation of left hand pain, starting a few months ago.  No synovitis on current exam suggestive of an ongoing inflammatory arthritis and x-ray without inflammatory/erosive changes.  Given the \"burning \"sensation, suspicious for an underlying neuropathy and has been referred to EM[facility] per orthopedics.  Could consider carpal tunnel injection, however, bilateral prayer sign and Tinel sign negative on today's exam and the distribution is not necessarily consistent with carpal tunnel syndrome.  She had negative autoimmune testing a few years ago and low suspicion at this time but could consider repeat testing for further evaluation.    PLAN:  - Repeat autoimmune testing ordered, as above  - Agree with EMG per orthopedics  - For pain relief, patient can take Tylenol-arthritis strength at 2 tablets every 8 hours as needed.   - Consult/evaluation communicated with referring physician/provider.    I will MyChart patient on receipt of above.  Otherwise, patient may follow-up as needed    Layla Recio DO  6/12/2025   2:31 PM    HPI:     6/12/2025 Initial Consult: I had the pleasure of seeing Lisbeth Bowens on 6/12/2025 as a new outpatient consultation for hand pain. The patient was originally referred by GRETCHEN Byrnes.       35 year old female w/ PMH migraines, MDD, right shoulder adhesive capsulitis, seasonal allergies who presents to clinic today.Of note, patient recently seen by orthopedics, GRETCHEN Byrnes, on 6/9/25.  During that appointment, patient presenting with left hand pain and difficulty in typing/ strength for about 2  months.  Notes noting patient does not require orthopedic surgery intervention.  Was recommended EMG and referral to rheumatology.    Patient states symptoms started about a month ago.  She is right-handed but her symptoms concentrate along her left hand.  Describes pain in burning especially affecting her knuckles, worse when making a fist.  Pressure-like pushing open a door can make it worse as well and make dexterity of her hand difficult.  Symptoms are worse in her middle finger but can also affect her ring and pointer finger as well.  Symptoms worse at night after using it all day.  Has tried ibuprofen that is slightly helpful.  ROS also positive for cold intolerance.  No family history of autoimmune disease such as gout, lupus, rheumatoid arthritis, psoriatic arthritis.  ROS otherwise negative for unexplained fever, photosensitive rash, unintentional weight loss, Raynaud's phenomenon, serositis, uveitis/iritis.    Current Medications:  PRN Ibuprofen    Medication History:  N/A    Interval History:   See Above    HISTORY:  Past Medical History[1]   Social Hx Reviewed   Family Hx Reviewed     Medications (Active prior to today's visit):  Current Medications[2]    Allergies:  Allergies[3]      ROS:   Review of Systems   Constitutional:  Negative for chills and fever.   HENT:  Negative for congestion, hearing loss, mouth sores, nosebleeds and trouble swallowing.    Eyes:  Negative for photophobia, pain, redness and visual disturbance.   Respiratory:  Negative for cough and shortness of breath.    Cardiovascular:  Negative for chest pain, palpitations and leg swelling.   Gastrointestinal:  Negative for abdominal pain, blood in stool, diarrhea and nausea.   Endocrine: Negative for cold intolerance and heat intolerance.   Genitourinary:  Negative for dysuria, frequency and hematuria.   Musculoskeletal:  Positive for arthralgias. Negative for back pain, gait problem, joint swelling, myalgias, neck pain and neck  stiffness.   Skin:  Negative for color change and rash.   Neurological:  Positive for weakness and numbness. Negative for dizziness and headaches.   Psychiatric/Behavioral:  Negative for confusion and dysphoric mood.         PHYSICAL EXAM:     Constitutional:  Well developed, Well nourished, No acute distress  HENT:  Normocephalic, Atraumatic, Bilateral external ears normal, Oropharynx moist, No oral exudates.  Neck: Normal range of motion, No tenderness, Supple, No stridor.  Eyes:  PERRL, EOMI, Conjunctiva normal, No discharge.  Respiratory:  Normal breath sounds, No respiratory distress, No wheezing.  Cardiovascular:  Normal heart rate, Normal rhythm, No murmurs, No rubs, No gallops.  GI:  Bowel sounds normal, Soft, No tenderness, No masses, No pulsatile masses.  : No CVA tenderness.  Musculoskeletal: Negative bilateral prayer sign.  Negative bilateral Tinel's sign.  No synovitis on exam.  A comprehensive 28 count joint exam was done and was negative for swelling or tenderness except as noted. Inspections for misalignment, asymmetry, crepitation, defects, tenderness, masses, nodules, effusions, range of motion, and stability in the upper and lower extremities bilaterally are all normal unless noted.           Integument:  Warm, Dry, No erythema, No rash.  Lymphatic:  No lymphadenopathy noted.  Neurologic:  Alert & oriented x 3, Normal motor function, Normal sensory function, No focal deficits noted.  Psychiatric:  Affect normal, Judgment normal, Mood normal.    LABS:     Prior lab work reviewed and notable for:     2025:  CMP with BUN 9, CR 0.64, LFTs not elevated, no gamma gap  CBC without cytopenias no leukocytosis    :  KARRI IFA negative  RF less than 14 WNL, CCP less than 16 WNL    2016:  CK 96 WNL      Imagin/23/25 L Hand XR:     FINDINGS:  Normal joint spaces.  No fracture, expansile lesion or erosion.  No osteophytes.  No abnormal soft tissue calcifications.     2021 right  shoulder MRI:    MPRESSION:   Calcific supraspinatus tendinitis with minimal additional bursal surface fraying of the supraspinatus tendon. No discrete rotator cuff tear is seen. There is mild reactive inflammatory change in the subacromial/subdeltoid bursa without significant fluid.     11/19/13 Lumbar MRI:     CONCLUSION:    1. Small central L5-S1 disc protrusion without deformity of thecal sac or nerve roots. Mild dextroscoliosis.          [1]   Past Medical History:   Acute otitis media    Anal fissure    Anxiety attack    Attention deficit disorder with hyperactivity(314.01)    Attention deficit disorder without mention of hyperactivity    Headache(784.0)    Hemorrhoids, internal, with bleeding    Knee pain    Major depressive disorder, recurrent episode, moderate (HCC)    Midepigastric pain    Migraines    Other postprocedural status(V45.89)    Overdose    Pain in joint, lower leg    Palpitations    Pharyngitis    recurrent    Rectal pain    Self-mutilation    Sinusitis    Suicidal ideation    Tobacco dependence    Unspecified sinusitis (chronic)    recurrent    URI (upper respiratory infection)    Visual impairment    GLASSES   [2]   Current Outpatient Medications   Medication Sig Dispense Refill    lisdexamfetamine (VYVANSE) 50 MG Oral Cap Take 1 capsule (50 mg total) by mouth daily. 30 capsule 0    BUPROPION  MG Oral Tablet 24 Hr TAKE 1 TABLET BY MOUTH EVERY DAY IN THE MORNING 90 tablet 0    TEMAZEPAM 30 MG Oral Cap TAKE 1 CAPSULE BY MOUTH NIGHTLY AS NEEDED FOR SLEEP. 30 capsule 2    traZODone 50 MG Oral Tab Take 1-3 tablets ( mg total) by mouth nightly as needed for Sleep. 270 tablet 0    ALPRAZolam 0.5 MG Oral Tab Take 1 tablet (0.5 mg total) by mouth 3 (three) times daily as needed. 30 tablet 2    lamoTRIgine (LAMICTAL) 200 MG Oral Tab Take 0.5 tablets (100 mg total) by mouth 2 (two) times daily.      FLUOXETINE HCL 40 MG Oral Cap TAKE 1 CAPSULE (40 MG TOTAL) BY MOUTH EVERY MORNING. 90  capsule 0    estradiol 1 MG Oral Tab       progesterone oil 50 MG/ML Intramuscular Oil       levothyroxine 25 MCG Oral Tab Take 25 mcg by mouth before breakfast.     [3]   Allergies  Allergen Reactions    Cefadroxil HIVES     hives  hives    Bactrim [Sulfamethoxazole W/Trimethoprim] JITTERY     Hot, sweaty    Duricef [Cefadroxil Monohydrate]      hives    Levaquin [Levofloxacin] OTHER (SEE COMMENTS)     Severe tendonitis

## 2025-06-24 RX ORDER — CLINDAMYCIN HYDROCHLORIDE 300 MG/1
300 CAPSULE ORAL 2 TIMES DAILY
COMMUNITY
End: 2025-07-02

## 2025-06-24 RX ORDER — IBUPROFEN 200 MG
400 TABLET ORAL EVERY 6 HOURS PRN
COMMUNITY

## 2025-06-26 ENCOUNTER — LAB ENCOUNTER (OUTPATIENT)
Dept: LAB | Age: 35
End: 2025-06-26
Attending: INTERNAL MEDICINE
Payer: COMMERCIAL

## 2025-06-26 LAB — RHEUMATOID FACT SERPL-ACNC: <3.5 IU/ML (ref ?–14)

## 2025-06-26 PROCEDURE — 86431 RHEUMATOID FACTOR QUANT: CPT | Performed by: INTERNAL MEDICINE

## 2025-06-26 PROCEDURE — 86200 CCP ANTIBODY: CPT | Performed by: INTERNAL MEDICINE

## 2025-06-26 PROCEDURE — 36415 COLL VENOUS BLD VENIPUNCTURE: CPT | Performed by: INTERNAL MEDICINE

## 2025-06-27 LAB — CCP IGG SERPL-ACNC: 0.9 U/ML (ref 0–6.9)

## 2025-06-30 NOTE — H&P
GABRIELLE PAPERS RECEIVED 9/19/23. PT TO PAY WHEN SHE PICKS UP PAPERS   [facility]   part of [facility]    History and Physical    Lisbeth Bowens Patient Status:  Hospital Outpatient Surgery    1990 MRN [id number]   Location [facility] SURGERY Attending Jaspal Gold, Leo DUBON MD   Hosp Day # 0 PCP Marcin Clifton MD     Date:  2025  Date of Admission:  (Not on file)    History provided by:patient  HPI:   No chief complaint on file.    HPI endometriosis    History   Past Medical History[1]  Past Surgical History[2]  Family History[3]  Social History:  Short Social Hx on File[4]  Allergies/Medications:   Allergies: Allergies[5]  Prescriptions Prior to Admission[6]    Review of Systems:     All other systems reviewed and are negative.      Physical Exam:   Vital Signs:  Height 5' 6\" (1.676 m), weight 130 lb (59 kg), last menstrual period 2025, not currently breastfeeding.  Physical Exam  Constitutional:       Appearance: Normal appearance.   Cardiovascular:      Rate and Rhythm: Normal rate and regular rhythm.      Pulses: Normal pulses.      Heart sounds: Normal heart sounds.   Pulmonary:      Effort: Pulmonary effort is normal.      Breath sounds: Normal breath sounds.   Abdominal:      Palpations: Abdomen is soft.   Musculoskeletal:         General: Normal range of motion.   Skin:     General: Skin is warm and dry.   Neurological:      Mental Status: She is alert and oriented to person, place, and time.   Psychiatric:         Behavior: Behavior normal.       Cervical Papanicolaou done within 1 year of adm    Results:     Lab Results   Component Value Date    WBC 5.9 2025    HGB 13.7 2025    HCT 41.3 2025     2025    CREATSERUM 0.64 2025    BUN 9 2025     2025    K 4.5 2025    CL 99 2025    CO2 28 2025    GLU 81 2025    CA 9.3 2025    ALB 4.6 2025    ALKPHO 50 2025    BILT 0.4 2025    TP 6.9 2025    AST 13 2025    ALT 12 2025    PTT  26.1 2019    INR 0.93 10/18/2023    T4F 1.6 2025    TSH 1.65 2025    LIP 78 2018    DDIMER 0.41 2016    ESRML 11 2024    CRP 1.49 (H) 2016    MG 2.0 2016    TROP <0.046 2016    CK 96 2016    B12 582 2024     No results found.        Assessment/Plan:     LAPAROSCOPY, EXCISON OF ENDOMETRIOSIS, LYSIS OF ADHESIONS, POSSIBLE TUBOPLASTY, HYSTEROSCOPY, TUBAL LAVAGE             Md Leo Jeong MD  2025         [1]   Past Medical History:   Acute otitis media    Anal fissure    Anxiety attack    Attention deficit disorder with hyperactivity(314.01)    Headache(784.0)    Hemorrhoids, internal, with bleeding    Knee pain    Major depressive disorder, recurrent episode, moderate (HCC)    Midepigastric pain    Migraines    Other postprocedural status(V45.89)    Overdose    Pain in joint, lower leg    Palpitations    Pharyngitis    recurrent    Rectal pain    Self-mutilation    Sinusitis    Suicidal ideation    Tobacco dependence    Unspecified sinusitis (chronic)    recurrent    URI (upper respiratory infection)    Visual impairment    glasses/contacts   [2]   Past Surgical History:  Procedure Laterality Date      2020    Cholecystectomy  13    by Dr. Holland @ [place]    Colposcopy, cervix w/upper adjacent vagina; w/biopsy(s), cervix  ,    [facility]  2018    Sinus surgery        bilat endo sofia bullosa and middle turbinate  hypertrophy takedown w/right endoscopic maxillary antrostom anterior ethmoidectomy and left endo balloon maxillary antrostomiesbilat endo max sinus lavage and inferior turbinate submucous resection and outfracture.   [3]   Family History  Problem Relation Age of Onset    Heart Disorder Father     Lipids Father     Pulmonary Disease Father     Hypertension Father     Other (Other) Father     Depression Mother     Other (mother was adopted) Mother     Cancer Maternal Grandmother         kidney cancer    Cancer  Paternal Grandmother         uterine cancer    Other (Other) Sister         cerebral palsy   [4]   Social History  Socioeconomic History    Marital status:    Tobacco Use    Smoking status: Former     Current packs/day: 0.25     Types: Cigarettes     Passive exposure: Never    Smokeless tobacco: Never    Tobacco comments:     Quit 2022   Vaping Use    Vaping status: Never Used   Substance and Sexual Activity    Alcohol use: Not Currently    Drug use: Not Currently     Types: Cannabis     Comment: last use sept 5 ,2021    Sexual activity: Yes     Partners: Male   Other Topics Concern    Caffeine Concern Yes     Comment: 1 cup of coffee 3 days per week    Exercise Yes     Comment: fitness class weekly     Social Drivers of Health     Food Insecurity: No Food Insecurity (4/22/2025)    NCSS - Food Insecurity     Worried About Running Out of Food in the Last Year: No     Ran Out of Food in the Last Year: No   Transportation Needs: No Transportation Needs (4/22/2025)    NCSS - Transportation     Lack of Transportation: No   Housing Stability: Not At Risk (4/22/2025)    NCSS - Housing/Utilities     Has Housing: Yes     Worried About Losing Housing: No     Unable to Get Utilities: No   [5]   Allergies  Allergen Reactions    Cefadroxil HIVES     hives  hives    Duricef [Cefadroxil Monohydrate] HIVES    Bactrim [Sulfamethoxazole W/Trimethoprim] JITTERY     Hot, sweaty    Levaquin [Levofloxacin] OTHER (SEE COMMENTS)     Severe tendonitis   [6]   No medications prior to admission.

## 2025-07-01 ENCOUNTER — ANESTHESIA EVENT (OUTPATIENT)
Dept: SURGERY | Facility: HOSPITAL | Age: 35
End: 2025-07-01
Payer: COMMERCIAL

## 2025-07-02 ENCOUNTER — HOSPITAL ENCOUNTER (OUTPATIENT)
Facility: HOSPITAL | Age: 35
Setting detail: HOSPITAL OUTPATIENT SURGERY
Discharge: HOME | End: 2025-07-02
Attending: OBSTETRICS & GYNECOLOGY | Admitting: OBSTETRICS & GYNECOLOGY
Payer: COMMERCIAL

## 2025-07-02 ENCOUNTER — ANESTHESIA (OUTPATIENT)
Dept: SURGERY | Facility: HOSPITAL | Age: 35
End: 2025-07-02
Payer: COMMERCIAL

## 2025-07-02 VITALS
TEMPERATURE: 98 F | BODY MASS INDEX: 20.54 KG/M2 | DIASTOLIC BLOOD PRESSURE: 70 MMHG | WEIGHT: 127.81 LBS | OXYGEN SATURATION: 96 % | SYSTOLIC BLOOD PRESSURE: 109 MMHG | HEIGHT: 66 IN | RESPIRATION RATE: 20 BRPM | HEART RATE: 75 BPM

## 2025-07-02 DIAGNOSIS — N80.9 ENDOMETRIOSIS DETERMINED BY LAPAROSCOPY: Primary | ICD-10-CM

## 2025-07-02 LAB — B-HCG UR QL: NEGATIVE

## 2025-07-02 PROCEDURE — 88305 TISSUE EXAM BY PATHOLOGIST: CPT | Performed by: OBSTETRICS & GYNECOLOGY

## 2025-07-02 PROCEDURE — 81025 URINE PREGNANCY TEST: CPT

## 2025-07-02 RX ORDER — SODIUM CHLORIDE, SODIUM LACTATE, POTASSIUM CHLORIDE, CALCIUM CHLORIDE 600; 310; 30; 20 MG/100ML; MG/100ML; MG/100ML; MG/100ML
INJECTION, SOLUTION INTRAVENOUS CONTINUOUS
Status: DISCONTINUED | OUTPATIENT
Start: 2025-07-02 | End: 2025-07-02

## 2025-07-02 RX ORDER — HYDROCODONE BITARTRATE AND ACETAMINOPHEN 5; 325 MG/1; MG/1
2 TABLET ORAL ONCE AS NEEDED
Status: COMPLETED | OUTPATIENT
Start: 2025-07-02 | End: 2025-07-02

## 2025-07-02 RX ORDER — MIDAZOLAM HYDROCHLORIDE 1 MG/ML
1 INJECTION INTRAMUSCULAR; INTRAVENOUS EVERY 5 MIN PRN
Status: DISCONTINUED | OUTPATIENT
Start: 2025-07-02 | End: 2025-07-02

## 2025-07-02 RX ORDER — HYDROMORPHONE HYDROCHLORIDE 1 MG/ML
0.4 INJECTION, SOLUTION INTRAMUSCULAR; INTRAVENOUS; SUBCUTANEOUS EVERY 5 MIN PRN
Status: DISCONTINUED | OUTPATIENT
Start: 2025-07-02 | End: 2025-07-02

## 2025-07-02 RX ORDER — ONDANSETRON 2 MG/ML
4 INJECTION INTRAMUSCULAR; INTRAVENOUS EVERY 6 HOURS PRN
Status: DISCONTINUED | OUTPATIENT
Start: 2025-07-02 | End: 2025-07-02

## 2025-07-02 RX ORDER — HYDROCODONE BITARTRATE AND ACETAMINOPHEN 5; 325 MG/1; MG/1
1-2 TABLET ORAL EVERY 4 HOURS PRN
Qty: 20 TABLET | Refills: 0 | Status: SHIPPED | OUTPATIENT
Start: 2025-07-02

## 2025-07-02 RX ORDER — MIDAZOLAM HYDROCHLORIDE 1 MG/ML
INJECTION INTRAMUSCULAR; INTRAVENOUS
Status: COMPLETED
Start: 2025-07-02 | End: 2025-07-02

## 2025-07-02 RX ORDER — NALOXONE HYDROCHLORIDE 0.4 MG/ML
0.08 INJECTION, SOLUTION INTRAMUSCULAR; INTRAVENOUS; SUBCUTANEOUS AS NEEDED
Status: DISCONTINUED | OUTPATIENT
Start: 2025-07-02 | End: 2025-07-02

## 2025-07-02 RX ORDER — LIDOCAINE HYDROCHLORIDE 10 MG/ML
INJECTION, SOLUTION EPIDURAL; INFILTRATION; INTRACAUDAL; PERINEURAL AS NEEDED
Status: DISCONTINUED | OUTPATIENT
Start: 2025-07-02 | End: 2025-07-02 | Stop reason: SURG

## 2025-07-02 RX ORDER — CEFAZOLIN SODIUM 1 G/3ML
INJECTION, POWDER, FOR SOLUTION INTRAMUSCULAR; INTRAVENOUS AS NEEDED
Status: DISCONTINUED | OUTPATIENT
Start: 2025-07-02 | End: 2025-07-02 | Stop reason: SURG

## 2025-07-02 RX ORDER — ONDANSETRON 2 MG/ML
INJECTION INTRAMUSCULAR; INTRAVENOUS
Status: DISCONTINUED
Start: 2025-07-02 | End: 2025-07-02 | Stop reason: WASHOUT

## 2025-07-02 RX ORDER — PHENAZOPYRIDINE HYDROCHLORIDE 200 MG/1
200 TABLET, FILM COATED ORAL ONCE
Status: COMPLETED | OUTPATIENT
Start: 2025-07-02 | End: 2025-07-02

## 2025-07-02 RX ORDER — GLYCOPYRROLATE 0.2 MG/ML
INJECTION, SOLUTION INTRAMUSCULAR; INTRAVENOUS AS NEEDED
Status: DISCONTINUED | OUTPATIENT
Start: 2025-07-02 | End: 2025-07-02 | Stop reason: SURG

## 2025-07-02 RX ORDER — HYDROMORPHONE HYDROCHLORIDE 1 MG/ML
0.6 INJECTION, SOLUTION INTRAMUSCULAR; INTRAVENOUS; SUBCUTANEOUS EVERY 5 MIN PRN
Status: DISCONTINUED | OUTPATIENT
Start: 2025-07-02 | End: 2025-07-02

## 2025-07-02 RX ORDER — KETOROLAC TROMETHAMINE 30 MG/ML
INJECTION, SOLUTION INTRAMUSCULAR; INTRAVENOUS AS NEEDED
Status: DISCONTINUED | OUTPATIENT
Start: 2025-07-02 | End: 2025-07-02 | Stop reason: SURG

## 2025-07-02 RX ORDER — PROCHLORPERAZINE EDISYLATE 5 MG/ML
5 INJECTION INTRAMUSCULAR; INTRAVENOUS EVERY 8 HOURS PRN
Status: DISCONTINUED | OUTPATIENT
Start: 2025-07-02 | End: 2025-07-02

## 2025-07-02 RX ORDER — CLINDAMYCIN PHOSPHATE 900 MG/50ML
900 INJECTION, SOLUTION INTRAVENOUS ONCE
Status: DISCONTINUED | OUTPATIENT
Start: 2025-07-02 | End: 2025-07-02 | Stop reason: HOSPADM

## 2025-07-02 RX ORDER — ACETAMINOPHEN 500 MG
1000 TABLET ORAL ONCE AS NEEDED
Status: COMPLETED | OUTPATIENT
Start: 2025-07-02 | End: 2025-07-02

## 2025-07-02 RX ORDER — HYDROCODONE BITARTRATE AND ACETAMINOPHEN 5; 325 MG/1; MG/1
1 TABLET ORAL ONCE AS NEEDED
Status: COMPLETED | OUTPATIENT
Start: 2025-07-02 | End: 2025-07-02

## 2025-07-02 RX ORDER — ACETAMINOPHEN 500 MG
1000 TABLET ORAL ONCE
Status: DISCONTINUED | OUTPATIENT
Start: 2025-07-02 | End: 2025-07-02 | Stop reason: HOSPADM

## 2025-07-02 RX ORDER — ONDANSETRON 8 MG/1
8 TABLET, ORALLY DISINTEGRATING ORAL EVERY 4 HOURS PRN
Qty: 10 TABLET | Refills: 0 | Status: SHIPPED | OUTPATIENT
Start: 2025-07-02

## 2025-07-02 RX ORDER — HYDROMORPHONE HYDROCHLORIDE 1 MG/ML
0.2 INJECTION, SOLUTION INTRAMUSCULAR; INTRAVENOUS; SUBCUTANEOUS EVERY 5 MIN PRN
Status: DISCONTINUED | OUTPATIENT
Start: 2025-07-02 | End: 2025-07-02

## 2025-07-02 RX ORDER — DIPHENHYDRAMINE HYDROCHLORIDE 50 MG/ML
12.5 INJECTION, SOLUTION INTRAMUSCULAR; INTRAVENOUS AS NEEDED
Status: DISCONTINUED | OUTPATIENT
Start: 2025-07-02 | End: 2025-07-02

## 2025-07-02 RX ORDER — MEPERIDINE HYDROCHLORIDE 25 MG/ML
INJECTION INTRAMUSCULAR; INTRAVENOUS; SUBCUTANEOUS
Status: COMPLETED
Start: 2025-07-02 | End: 2025-07-02

## 2025-07-02 RX ORDER — ONDANSETRON 2 MG/ML
INJECTION INTRAMUSCULAR; INTRAVENOUS AS NEEDED
Status: DISCONTINUED | OUTPATIENT
Start: 2025-07-02 | End: 2025-07-02 | Stop reason: SURG

## 2025-07-02 RX ORDER — BUPIVACAINE HYDROCHLORIDE 5 MG/ML
INJECTION, SOLUTION EPIDURAL; INTRACAUDAL; PERINEURAL AS NEEDED
Status: DISCONTINUED | OUTPATIENT
Start: 2025-07-02 | End: 2025-07-02 | Stop reason: HOSPADM

## 2025-07-02 RX ORDER — ROCURONIUM BROMIDE 10 MG/ML
INJECTION, SOLUTION INTRAVENOUS AS NEEDED
Status: DISCONTINUED | OUTPATIENT
Start: 2025-07-02 | End: 2025-07-02 | Stop reason: SURG

## 2025-07-02 RX ORDER — NEOSTIGMINE METHYLSULFATE 1 MG/ML
INJECTION INTRAVENOUS AS NEEDED
Status: DISCONTINUED | OUTPATIENT
Start: 2025-07-02 | End: 2025-07-02 | Stop reason: SURG

## 2025-07-02 RX ORDER — SCOPOLAMINE 1 MG/3D
1 PATCH, EXTENDED RELEASE TRANSDERMAL ONCE
Status: DISCONTINUED | OUTPATIENT
Start: 2025-07-02 | End: 2025-07-02 | Stop reason: HOSPADM

## 2025-07-02 RX ORDER — MIDAZOLAM HYDROCHLORIDE 1 MG/ML
INJECTION INTRAMUSCULAR; INTRAVENOUS AS NEEDED
Status: DISCONTINUED | OUTPATIENT
Start: 2025-07-02 | End: 2025-07-02 | Stop reason: SURG

## 2025-07-02 RX ORDER — MEPERIDINE HYDROCHLORIDE 25 MG/ML
12.5 INJECTION INTRAMUSCULAR; INTRAVENOUS; SUBCUTANEOUS AS NEEDED
Status: COMPLETED | OUTPATIENT
Start: 2025-07-02 | End: 2025-07-02

## 2025-07-02 RX ORDER — DEXAMETHASONE SODIUM PHOSPHATE 4 MG/ML
VIAL (ML) INJECTION AS NEEDED
Status: DISCONTINUED | OUTPATIENT
Start: 2025-07-02 | End: 2025-07-02 | Stop reason: SURG

## 2025-07-02 RX ADMIN — DEXAMETHASONE SODIUM PHOSPHATE 8 MG: 4 MG/ML VIAL (ML) INJECTION at 16:10:00

## 2025-07-02 RX ADMIN — NEOSTIGMINE METHYLSULFATE 5 MG: 1 INJECTION INTRAVENOUS at 17:18:00

## 2025-07-02 RX ADMIN — KETOROLAC TROMETHAMINE 30 MG: 30 INJECTION, SOLUTION INTRAMUSCULAR; INTRAVENOUS at 17:18:00

## 2025-07-02 RX ADMIN — ONDANSETRON 4 MG: 2 INJECTION INTRAMUSCULAR; INTRAVENOUS at 17:18:00

## 2025-07-02 RX ADMIN — MIDAZOLAM HYDROCHLORIDE 2 MG: 1 INJECTION INTRAMUSCULAR; INTRAVENOUS at 15:50:00

## 2025-07-02 RX ADMIN — ROCURONIUM BROMIDE 50 MG: 10 INJECTION, SOLUTION INTRAVENOUS at 15:54:00

## 2025-07-02 RX ADMIN — CEFAZOLIN SODIUM 2 G: 1 INJECTION, POWDER, FOR SOLUTION INTRAMUSCULAR; INTRAVENOUS at 16:03:00

## 2025-07-02 RX ADMIN — GLYCOPYRROLATE 0.4 MG: 0.2 INJECTION, SOLUTION INTRAMUSCULAR; INTRAVENOUS at 17:18:00

## 2025-07-02 RX ADMIN — LIDOCAINE HYDROCHLORIDE 50 MG: 10 INJECTION, SOLUTION EPIDURAL; INFILTRATION; INTRACAUDAL; PERINEURAL at 15:54:00

## 2025-07-02 RX ADMIN — SODIUM CHLORIDE, SODIUM LACTATE, POTASSIUM CHLORIDE, CALCIUM CHLORIDE: 600; 310; 30; 20 INJECTION, SOLUTION INTRAVENOUS at 15:49:00

## 2025-07-02 NOTE — INTERVAL H&P NOTE
Pre-op Diagnosis: ENDOMETRIOSIS    The above referenced H&P was reviewed by Md Leo Jeong MD on 7/2/2025, the patient was examined and no significant changes have occurred in the patient's condition since the H&P was performed.  I discussed with the patient and/or legal representative the potential benefits, risks and side effects of this procedure; the likelihood of the patient achieving goals; and potential problems that might occur during recuperation.  I discussed reasonable alternatives to the procedure, including risks, benefits and side effects related to the alternatives and risks related to not receiving this procedure.  We will proceed with procedure as planned.

## 2025-07-02 NOTE — OPERATIVE REPORT
[facility]    PATIENT'S NAME: KARI JAIMES   ATTENDING PHYSICIAN: Leo Shay M.D.   OPERATING PHYSICIAN: Leo Shay M.D.   PATIENT ACCOUNT#:   [id number]    LOCATION:  [facility] OR [facility]  MEDICAL RECORD #:   [id number]       YOB: 1990  ADMISSION DATE:       07/02/2025      OPERATION DATE:  07/02/2025    OPERATIVE REPORT      PREOPERATIVE DIAGNOSIS:    1.   Endometriosis.  2.   Pelvic adhesions.  3.   Tube adhesions.  POSTOPERATIVE DIAGNOSIS:    1.   Stage I endometriosis.    2.   Left paratubal cyst.  3.   Patent fallopian tubes.  4.   Fimbrial bands.  PROCEDURE:  Examination under anesthesia, hysteroscopy, operative laparoscopy, excision of stage I endometriosis, chromopertubation, lysis of fimbrial bands, excision of a left paratubal cyst.      ASSISTANT SURGEON:  Lo Lozoya MD.    ASSISTANT:  Tammy Amador RN, FA.    ANESTHESIA:  The procedure performed under general endotracheal anesthesia.    ESTIMATED BLOOD LOSS:  5 mL.    COMPLICATIONS:  There were no complications.    FINDINGS:  On examination under anesthesia, normal-sized uterus.  No adnexal masses.  Hysteroscopy reveals normal uterine cavity.  Both tubal ostia were noted to be normal.  On operative laparoscopy, there was noted to be a normal liver edge, gallbladder, normal appendix.  The uterus, tubes, and ovaries were all completely mobile.  Chromopertubation revealed bilateral fill and spill.  There were noted to be bilateral fimbrial bands, left greater than right.  There was also noted to be a paratubal cyst on the left at the distal end, which ultimately was excised.  Stage I endometriosis was noted in the cul-de-sac.    OPERATIVE TECHNIQUE:  The patient was intubated for the purpose of general endotracheal anesthesia, prepped and draped in usual manner for laparoscopy.  Bladder catheterized.  Patient examined under anesthesia.  Findings noted above.  Weighted speculum introduced into the vaginal  vault.  Anterior lip of cervix grasped with a single-tooth tenaculum.  Uterus sounds to 8 cm.  Hysteroscopy was now performed with findings noted above.  Having completed this, a Jarcho cannula placed.    Attention was now directed toward the umbilicus.  The umbilicus was everted with an Allis clamp.  Two towel clamps placed on either side of the umbilicus.  Small rent made mid umbilical.  Through this, a Veress needle was placed.  Insufflation started with carbon dioxide gas.  When the pressure reached 14 mmHg, Veress needle was removed, replaced by laparoscopy trocar and sleeve.  One side trocar was removed and was replaced by laparoscope.  Second and third punctures were now placed laterally under direct visualization.  Findings were as noted above.  Our attention was first directed toward the stage I endometriosis.  This was excised with sharp scissors.  Meticulous hemostasis noted.  Now, chromopertubation revealed bilateral fill and spill from the fallopian tubes.  Fimbrial bands were noted and were transected with Harmonic energy.  Moreover, the left paratubal cyst was removed.    At the end of the procedure, the pelvis was thoroughly irrigated, abdomen suctioned, incision closed in usual fashion.  Patient was extubated and taken to the recovery room in satisfactory condition.    Dictated By Leo Shay M.D.  d: 07/02/2025 17:19:01  t: 07/02/2025 17:26:14  [facility] [id number]  CM/

## 2025-07-02 NOTE — ANESTHESIA PREPROCEDURE EVALUATION
PRE-OP EVALUATION    Patient Name: Lisbeth Bowens    Admit Diagnosis: ENDOMETRIOSIS    Pre-op Diagnosis: ENDOMETRIOSIS    LAPAROSCOPY, EXCISON OF ENDOMETRIOSIS, LYSIS OF ADHESIONS, POSSIBLE TUBOPLASTY, HYSTEROSCOPY, TUBAL LAVAGE    Anesthesia Procedure: LAPAROSCOPY, EXCISON OF ENDOMETRIOSIS, LYSIS OF ADHESIONS, POSSIBLE TUBOPLASTY, HYSTEROSCOPY, TUBAL LAVAGE    Surgeons and Role:     * Jaspal Gold, Leo DUBON MD - Primary    Pre-op vitals reviewed.  Temp: 98.2 °F (36.8 °C)  Pulse: 84  Resp: 16  BP: 117/85  SpO2: 100 %  Body mass index is 20.63 kg/m².    Current medications reviewed.  Hospital Medications:  Current Medications[1]    Outpatient Medications:   Prescriptions Prior to Admission[2]    Allergies: Cefadroxil, Duricef [cefadroxil monohydrate], Bactrim [sulfamethoxazole w/trimethoprim], and Levaquin [levofloxacin]      Anesthesia Evaluation    Patient summary reviewed.    Anesthetic Complications           GI/Hepatic/Renal                                 Cardiovascular                                                       Endo/Other                                  Pulmonary                           Neuro/Psych      (+) depression             (+) headaches           Adhesive capsulitis of right shoulder Attention deficit hyperactivity disorder (ADHD)  Depression affecting pregnancy (HCC) Deviated septum  H/O LEEP 2018 STEVE 2, +hpv Hemorrhoids, internal, with bleeding  History of colposcopy with cervical biopsy 2018 STEVE 3 History of loop electrosurgical excision procedure (LEEP) of cervix affecting pregnancy in second trimester (HCC)  Iron deficiency Major depressive disorder, recurrent episode, moderate (HCC)  Migraine without aura and without status migrainosus, not intractable Pain in joint, lower leg  Pregnancy resulting from assisted reproductive technology in second trimester (HCC) S/P primary low transverse  2020  Seasonal allergies Shoulder instability, right  Suicidal  ideation             Past Surgical History[3]  Social Hx on file[4]  History   Drug Use Unknown     Comment: last use sept 5 ,2021     Available pre-op labs reviewed.  Lab Results   Component Value Date    WBC 5.9 04/21/2025    RBC 4.50 04/21/2025    HGB 13.7 04/21/2025    HCT 41.3 04/21/2025    MCV 91.8 04/21/2025    MCH 30.4 04/21/2025    MCHC 33.2 04/21/2025    RDW 11.8 04/21/2025     04/21/2025     Lab Results   Component Value Date     04/21/2025    K 4.5 04/21/2025    CL 99 04/21/2025    CO2 28 04/21/2025    BUN 9 04/21/2025    CREATSERUM 0.64 04/21/2025    GLU 81 04/21/2025    CA 9.3 04/21/2025            Airway      Mallampati: I  Mouth opening: >3 FB  TM distance: > 6 cm  Neck ROM: full Cardiovascular    Cardiovascular exam normal.         Dental    Dentition appears grossly intact         Pulmonary    Pulmonary exam normal.                 Other findings              ASA: 2   Plan: general                             Present on Admission:  **None**             [1]    [Transfer Hold] acetaminophen (Tylenol Extra Strength) tab 1,000 mg  1,000 mg Oral Once    [Transfer Hold] scopolamine (Transderm-Scop) 1 MG/3DAYS patch 1 patch  1 patch Transdermal Once    lactated ringers infusion   Intravenous Continuous    [COMPLETED] phenazopyridine (Pyridium) tab 200 mg  200 mg Oral Once    gentamicin (Garamycin) 300 mg in sodium chloride 0.9% 100 mL IVPB  5 mg/kg Intravenous Once    clindamycin phosphate in NaCl 0.9% (Cleocin) 900 mg/50mL IVPB premix 900 mg  900 mg Intravenous Once   [2]   Medications Prior to Admission   Medication Sig Dispense Refill Last Dose/Taking    clindamycin 300 MG Oral Cap Take 1 capsule (300 mg total) by mouth 2 (two) times daily.   Taking    ibuprofen 200 MG Oral Tab Take 2 tablets (400 mg total) by mouth every 6 (six) hours as needed for Pain.   6/23/2025    Lisdexamfetamine Dimesylate (VYVANSE) 50 MG Oral Chew Tab Chew 50 mg by mouth daily. 30 tablet 0 Taking    BUPROPION ER  150 MG Oral Tablet 24 Hr TAKE 1 TABLET BY MOUTH EVERY DAY IN THE MORNING 90 tablet 0 Taking    TEMAZEPAM 30 MG Oral Cap TAKE 1 CAPSULE BY MOUTH NIGHTLY AS NEEDED FOR SLEEP. 30 capsule 2 Taking As Needed    traZODone 50 MG Oral Tab Take 1-3 tablets ( mg total) by mouth nightly as needed for Sleep. 270 tablet 0 Taking As Needed    ALPRAZolam 0.5 MG Oral Tab Take 1 tablet (0.5 mg total) by mouth 3 (three) times daily as needed. 30 tablet 2 Taking As Needed    lamoTRIgine (LAMICTAL) 200 MG Oral Tab Take 0.5 tablets (100 mg total) by mouth in the morning and 0.5 tablets (100 mg total) before bedtime.   Taking    [] FLUOXETINE HCL 40 MG Oral Cap TAKE 1 CAPSULE (40 MG TOTAL) BY MOUTH EVERY MORNING. 90 capsule 0 Taking    estradiol 1 MG Oral Tab Take 1 tablet (1 mg total) by mouth daily as needed. During IVF treatments       progesterone oil 50 MG/ML Intramuscular Oil During IVF treatments      [3]   Past Surgical History:  Procedure Laterality Date      2020    Cholecystectomy  12    by Dr. Holland @ [place]    Colposcopy, cervix w/upper adjacent vagina; w/biopsy(s), cervix  ,2018    Leep  2018    Sinus surgery    2010    bilat endo sofia bullosa and middle turbinate  hypertrophy takedown w/right endoscopic maxillary antrostom anterior ethmoidectomy and left endo balloon maxillary antrostomiesbilat endo max sinus lavage and inferior turbinate submucous resection and outfracture.   [4]   Social History  Socioeconomic History    Marital status:    Tobacco Use    Smoking status: Former     Current packs/day: 0.25     Types: Cigarettes     Passive exposure: Never    Smokeless tobacco: Never    Tobacco comments:     Quit    Vaping Use    Vaping status: Never Used   Substance and Sexual Activity    Alcohol use: Not Currently    Drug use: Not Currently     Types: Cannabis     Comment: last use     Sexual activity: Yes     Partners: Male   Other Topics Concern    Caffeine  Concern Yes     Comment: 1 cup of coffee 3 days per week    Exercise Yes     Comment: fitness class weekly

## 2025-07-02 NOTE — ANESTHESIA POSTPROCEDURE EVALUATION
[facility]    Lisbeth Bowens Patient Status:  Hospital Outpatient Surgery   Age/Gender 35 year old female MRN [id number]   Location [facility] SURGERY Attending Jaspal Gold, Leo DUBON MD   Hosp Day # 0 PCP Marcin Clifton MD       Anesthesia Post-op Note    LAPAROSCOPY, EXCISON OF ENDOMETRIOSIS, LYSIS OF ADHESIONS, POSSIBLE TUBOPLASTY, HYSTEROSCOPY, TUBAL LAVAGE    Procedure Summary       Date: 07/02/25 Room / Location:  MAIN OR 10 / [facility] MAIN OR    Anesthesia Start: 1549 Anesthesia Stop: 1733    Procedure: LAPAROSCOPY, EXCISON OF ENDOMETRIOSIS, LYSIS OF ADHESIONS, POSSIBLE TUBOPLASTY, HYSTEROSCOPY, TUBAL LAVAGE (Abdomen) Diagnosis: (ENDOMETRIOSIS)    Surgeons: Jaspal Gold, Leo DUBON MD Anesthesiologist: Gray Morel MD    Anesthesia Type: general ASA Status: 2            Anesthesia Type: general    Vitals Value Taken Time   /85 07/02/25 17:35   Temp 99 07/02/25 17:35   Pulse 67 07/02/25 17:35   Resp 20 07/02/25 17:35   SpO2 99 07/02/25 17:35           Patient Location: PACU    Anesthesia Type: general    Airway Patency: patent    Postop Pain Control: adequate    Mental Status: mildly sedated but able to meaningfully participate in the post-anesthesia evaluation    Nausea/Vomiting: none    Cardiopulmonary/Hydration status: stable euvolemic    Complications: no apparent anesthesia related complications    Postop vital signs: stable    Dental Exam: Unchanged from Preop    Patient to be discharged from PACU when criteria met.

## 2025-07-02 NOTE — ANESTHESIA PROCEDURE NOTES
Airway  Date/Time: 7/2/2025 3:57 PM  Reason: elective    Airway not difficult    General Information and Staff   Patient location during procedure: OR  Anesthesiologist: Parker Li MD  Resident/CRNA: Alejandrina Banuelos CRNA  Performed: CRNA   Performed by: Alejandrina Banuelos CRNA  Authorized by: Parker Li MD        Indications and Patient Condition  Indications for airway management: anesthesia  Sedation level: deep      Preoxygenated: yesPatient position: sniffing    Mask difficulty assessment: 1 - vent by mask    Final Airway Details    Final airway type: endotracheal airway    Successful airway: ETT  Cuffed: yes   Successful intubation technique: direct laryngoscopy  Facilitating devices/methods: intubating stylet  Endotracheal tube insertion site: oral  Blade: Bob  Blade size: #3  ETT size (mm): 7.0    Cormack-Lehane Classification: grade I - full view of glottis  Placement verified by: capnometry   Measured from: lips  ETT to lips (cm): 21  Number of attempts at approach: 1

## 2025-07-03 NOTE — DISCHARGE INSTRUCTIONS
Home Care Instructions Following Your Laparoscopic Procedure      Lisbeth-  We hope you were pleased with your care at [facility].  We wish you the best outcome and overall experience with your operation.  These instructions will help to minimize pain, promote healing, and improve the likelihood of a successful result.    What to Expect:  Expect some vaginal bleeding, abdominal cramping, and spotting for 1-2 weeks after your procedure  Use pads only. No tampons.  Call the office, if you experience heavy bleeding ( saturate one pad every hour)  You might experience mild-moderate upper back pain, shoulder pain, chest pain, and/or abdominal bloating from the gas used during your procedure. These symptoms can last up to 3 days.  Symptoms can be relieved by lying with your back elevated to 30 degrees.  Tenderness, burning, and/or a \"pulling\" sensation at the incisions sites are typical.  The pain can radiate to your hips, flanks, and/or groin.  This discomfort can last up to six weeks.  Pain with urination and bowel movements is typical.  This symptom can last up to two months  Fatigue, tiredness, or \"a washed out\" feeling is typical in the first few days following your procedure.  You might experience a temporary sore throat due to the breathing tube used for anesthesia during your operation.     Bandages (Dressing)  Your small abdominal incisions have been closed with absorbable sutures and white tapes (Steri-Strips).  There might be a clear plastic patch (Tegaderm) covering the tape. Blood-tinged oozing at the incision sites is to be expected.  Leave the Steri-Strips and Tegaderm in place, unless fluid accumulates under the Tegaderm  If the Steri-Strips and/or Tegaderm fall off, do not replace.    Remove the Tegaderm in one week, unless moisture or drainage collects under it.  Gently remove the Tegaderm, if you notice trapped moisture or drainage.  Do not replace.  Apply gauze as needed.  All dressings can be  removed 1 week after surgery.    Wound Care  Keep your incisions clean.  Pat dry after showers.    Do not use ointments, alcohol, or peroxide on the incisions.    Bathing/Showers  You can resume showering tomorrow  No baths, swimming, or hot tubs for 2 weeks     Pain Medication  Vicoprofen: Take one  tablet every 4-6 hours as needed for pain.  Do not exceed 5 (five) tablets each day  Do not take Vicoprofen, if you do not have pain.   Pain medication can cause constipation.  Use stool softeners, such as Milk of Magnesia or Colace.  Fruit ( prunes, apricots) can also help to prevent constipation      Other Prescription Medication  The following medication might have been prescribed for you:  Zofran is a medication which can help with nausea.  Take as prescribed  An antibiotic might also have been prescribed for you and is to be taken as directed    Over-The-Counter Medication  Non-prescription anti-inflammatory medications can also help to ease the pain.  You can take Aleve or ibuprofen in two days  Take as directed on the bottle  Drink a full glass of water with the medication. Do not take medication on an empty stomach.    Home Medication  Resume your home medications as instructed  You can resume your herbs and vitamins tomorrow    Diet  Anesthesia can slow your bowel motility.   Start a home with a liquid diet.  Resume a normal diet after you have passed gas.  Drink 16 glasses( 8 ounce) of fluid each day.     Activity  Refrain from vaginal intercourse, vaginal suppositories, tampon use, and douches for 2 weeks  No strenuous activity or heavy lifting( > 25 lbs.) for 3 weeks.  You can go up and down the stairs as tolerated.    You cannot return to work, if your work requires strenuous activity for 3 weeks  No physical exercise, sports, or gym workouts until you are allowed to participate in strenuous activity.  Walking at a normal pace is acceptable    Return to Work  You can return to work in 3-7 days, if your work  is considered sedentary and does not involve strenuous activity.  Do not return to work for 3 weeks, if your job involves strenuous activity  Contact your doctor's office, if you need a medical note.     Driving  Refrain from driving for one week  Do not drive if you are  taking pain medication.    Follow-up Appointment with Your Gynecologist  Call the office tomorrow for an appointment in 2 weeks, if you are a patient of   Dr. Grossman or Dr. Lozoya  Call the office tomorrow for an appointment in 4-6 weeks, if you are a fertility patient or a patient with pelvic pain of Dr. Shay.  Verify your appointment date, day, time, and location.  At your 1st postoperative office visit:  Your wounds will be evaluated, healing assessed, and any additional concerns and instructions will be discussed.    Bowling/Catheter Removal   If you had a Bowling Catheter placed prior to your hospital discharge, remove Bowling Catheter in the early morning, the day after surgery (Post-Op Day 1). If you are unable to void within 6 hours of removing catheter, contact office immediately.    Questions or Concerns  Call the office if you experience severe pain not controlled by pain medication, swelling, persistent vomiting,  incisional redness/warmth/yellowish discharge, bleeding, fever > 100.4, shortness of breath, difficulty breathing, moderate-severe calf pain, difficulty urinating, or other concerns.  If your call is made after office hours, a physician's assistant or fellow will be available to help you.  There is always a doctor from the practice who is covering the patient calls.    Lisbeth  Thank you for coming to [facility] for your operation.  The nurses and the anesthesiologist try very hard to make sure you receive the best care possible.  Your trust in them is greatly appreciated.    Thanks so much,  Dr. Shay, Dr. Grossman, and Dr. Lozoya  [facility] [place]    You received a drug called Toradol which  is an Anti Inflammatory at: 5:18pm.  If you are allowed to take Anti inflammatories:    Do not take any Anti Inflammatory like Motrin, Aleve or Ibuprophen until after: TWO DAYS.  Please report any suspected allergic reactions or bleeding issues to your doctor     You have been given a prescription for Norco 5/325  Norco was Given to you at:  Next dose due:    Take this medication as directed  This medication contains Tylenol (acetaminophen)  Do not take additional Tylenol while taking Norco    Norco is a Narcotic and can be constipating or upset your stomach  Don't take Norco on an empty stomach  Drink plenty of water  Alcoholic beverages should be avoided while taking narcotics

## 2025-07-17 ENCOUNTER — PROCEDURE VISIT (OUTPATIENT)
Dept: PHYSICAL MEDICINE AND REHAB | Facility: CLINIC | Age: 35
End: 2025-07-17
Payer: COMMERCIAL

## 2025-07-17 DIAGNOSIS — M79.642 LEFT HAND PAIN: ICD-10-CM

## 2025-07-17 NOTE — PROGRESS NOTES
[facility]  Electromyography Consultation      History of Present Illness:    Dear Sincer,  Thank you for the opportunity to see Lisbeth Velasquez Lacey for electrodiagnostic consultation today. As you know the patient is a 35 year old female with a chief complaint of decreased left  strength.  She also has difficulty in typing due to stiffness and weakness.  This has been ongoing for approximately 2 months.    PAST MEDICAL HISTORY:  Past Medical History[1]    SURGICAL HISTORY:  Past Surgical History[2]    SOCIAL HISTORY:   Social History     Occupational History    Not on file   Tobacco Use    Smoking status: Former     Current packs/day: 0.25     Types: Cigarettes     Passive exposure: Never    Smokeless tobacco: Never    Tobacco comments:     Quit 2022   Vaping Use    Vaping status: Never Used   Substance and Sexual Activity    Alcohol use: Not Currently    Drug use: Not Currently     Types: Cannabis     Comment: last use sept 5 ,2021    Sexual activity: Yes     Partners: Male       FAMILY HISTORY:   Family History[3]    CURRENT MEDICATIONS:   Current Medications[4]    PHYSICAL EXAM:   [facility] 06/18/2025 (Exact Date)     There is no height or weight on file to calculate BMI.      General: No immediate distress   Extremities: peripheral pulses intact, no lower extremity edema bilaterally   Skin: No lesions noted.   Neuro:   Strength: Upper extremities have 5/5 strength  Muscle bulk: No atrophy  Sensation: Normal upper extremities  Reflexes: Normal upper extremities  Tinel's sign: Negative bilaterally    EMG/NCV  Sensory NCS      Nerve / Sites Distance Segments Peak Lat NP Amp    cm  ms µV   L MEDIAN - Dig III Antidr      Wrist 14 Wrist - Dig III 3.35 86.1      Ref.  Ref. 3.80 20.0      Palm 7 Palm - Dig III 2.15 30.4   L ULNAR - Dig V Antidr      Wrist 14 Wrist - Dig V 3.55 72.1      Ref.  Ref. 3.80 10.0       Motor NCS      Nerve / Sites Distance Segments Latency  Amplitude Velocity Amp.1-2 Peak Dur. Area    cm  ms mV m/s % ms mVms   L MEDIAN - APB      Wrist 8 Wrist - APB 3.75 11.8  100 6.20 46.8      Ref.  Ref. 4.40 4.0          Elbow 19.5 Elbow - Wrist 7.25 11.8 55.7 99.6 6.40 47.6      Ref.  Ref.   49.0      L ULNAR - ADM      Wrist 8 Wrist - ADM 3.05 13.1  100 5.75 39.6      Ref.  Ref. 4.20 5.0          B.Elbow 20 B.Elbow - Wrist 6.50 11.9 58.0 91 5.90 37.3      Ref.  Ref.   50.0         A.Elbow 9.5 A.Elbow - B.Elbow 8.00 11.6 63.3 88.4 6.05 36.8       EMG Summary Table     Spontaneous MUAP Recruitment    IA Fib PSW Fasc H.F. Amp Dur. PPP Pattern   L. TRICEPS N None None None None N N N N   L. BICEPS N None None None None N N N N   L. FLEX CARPI RAD N None None None None N N N N   L. FIRST D INTEROSS N None None None None N N N N   L. ABD POLL BREVIS N None None None None N N N N       Findings: Extremities were warmed with hot packs for 15 minutes prior to testing and skin temperature maintained above 32 C.  Sensory nerve conduction studies revealed normal left median and ulnar responses.  Motor nerve conduction studies revealed normal left median and ulnar motor responses with normal latencies, amplitudes and conduction velocities.  Needle EMG of the left upper extremity was normal in all muscles tested.  Impression:  1.  Normal study.  2.  No electrodiagnostic evidence of left median neuropathy at the wrist.  3.  No electrodiagnostic evidence of left ulnar neuropathy.  4.  No electrodiagnostic evidence of left cervical radiculopathy.      ASSESSMENT AND PLAN:  1. Left hand pain  The patient has a negative EMG.  Unfortunately does not help determine the etiology of her symptoms.  - EMG/NCV        Thank you for the opportunity to participate in the care of this patient.  Sincerely,    Raulito Wise M.D.  Diplomate American Board of Physical Medicine and Rehabilitation         [1]   Past Medical History:   Acute otitis media    Anal fissure    Anxiety attack     Attention deficit disorder with hyperactivity(314.01)    Headache(784.0)    Hemorrhoids, internal, with bleeding    Knee pain    Major depressive disorder, recurrent episode, moderate (HCC)    Midepigastric pain    Migraines    Other postprocedural status(V45.89)    Overdose    Pain in joint, lower leg    Palpitations    Pharyngitis    recurrent    Rectal pain    Self-mutilation    Sinusitis    Suicidal ideation    Tobacco dependence    Unspecified sinusitis (chronic)    recurrent    URI (upper respiratory infection)    Visual impairment    glasses/contacts   [2]   Past Surgical History:  Procedure Laterality Date      2020    Cholecystectomy  12    by Dr. Holland @ [place]    Colposcopy, cervix w/upper adjacent vagina; w/biopsy(s), cervix  ,    [place]  2018    Sinus surgery        bilat endo sofia bullosa and middle turbinate  hypertrophy takedown w/right endoscopic maxillary antrostom anterior ethmoidectomy and left endo balloon maxillary antrostomiesbilat endo max sinus lavage and inferior turbinate submucous resection and outfracture.   [3]   Family History  Problem Relation Age of Onset    Heart Disorder Father     Lipids Father     Pulmonary Disease Father     Hypertension Father     Other (Other) Father     Depression Mother     Other (mother was adopted) Mother     Cancer Maternal Grandmother         kidney cancer    Cancer Paternal Grandmother         uterine cancer    Other (Other) Sister         cerebral palsy   [4]   Current Outpatient Medications   Medication Sig Dispense Refill    Lisdexamfetamine Dimesylate (VYVANSE) 50 MG Oral Chew Tab Chew 50 mg by mouth daily. 30 tablet 0    HYDROcodone-acetaminophen 5-325 MG Oral Tab Take 1-2 tablets by mouth every 4 (four) hours as needed. 20 tablet 0    ondansetron 8 MG Oral Tablet Dispersible Take 1 tablet (8 mg total) by mouth every 4 (four) hours as needed. 10 tablet 0    ibuprofen 200 MG Oral Tab Take 2 tablets (400 mg total) by  mouth every 6 (six) hours as needed for Pain.      BUPROPION  MG Oral Tablet 24 Hr TAKE 1 TABLET BY MOUTH EVERY DAY IN THE MORNING 90 tablet 0    TEMAZEPAM 30 MG Oral Cap TAKE 1 CAPSULE BY MOUTH NIGHTLY AS NEEDED FOR SLEEP. 30 capsule 2    traZODone 50 MG Oral Tab Take 1-3 tablets ( mg total) by mouth nightly as needed for Sleep. 270 tablet 0    ALPRAZolam 0.5 MG Oral Tab Take 1 tablet (0.5 mg total) by mouth 3 (three) times daily as needed. 30 tablet 2    lamoTRIgine (LAMICTAL) 200 MG Oral Tab Take 0.5 tablets (100 mg total) by mouth in the morning and 0.5 tablets (100 mg total) before bedtime.        Normal rate, regular rhythm. No murmurs, rubs or gallops.

## (undated) DEVICE — ADHESIVE LIQ 2/3ML VI MASTISOL

## (undated) DEVICE — [HIGH FLOW INSUFFLATOR,  DO NOT USE IF PACKAGE IS DAMAGED,  KEEP DRY,  KEEP AWAY FROM SUNLIGHT,  PROTECT FROM HEAT AND RADIOACTIVE SOURCES.]: Brand: PNEUMOSURE

## (undated) DEVICE — CAUTERY PENCIL

## (undated) DEVICE — REDUCER FITTING (NON-STERILE) 7/8 IN (22 MM) TO 1/4 IN (6.4 MM): Brand: CONMED BUFFALO FILTER

## (undated) DEVICE — STAPLER SKIN INSORB 2030

## (undated) DEVICE — SUTURE SILK 2-0 SH

## (undated) DEVICE — REM POLYHESIVE ADULT PATIENT RETURN ELECTRODE: Brand: VALLEYLAB

## (undated) DEVICE — SOL  .9 1000ML BTL

## (undated) DEVICE — GLOVE SUR 6 SENSICARE PI PIP CRM PWD F

## (undated) DEVICE — SWAB PROCTO 16\" NON-STERILE

## (undated) DEVICE — SOLUTION DURAPREP 26ML APPLICATOR

## (undated) DEVICE — TROCAR: Brand: KII FIOS FIRST ENTRY

## (undated) DEVICE — SOLUTION IRRIG 1000ML 0.9% NACL USP BTL

## (undated) DEVICE — ELECTRODE BALL 5MM DBL-511

## (undated) DEVICE — GLOVE SUR 7.5 SENSICARE PI PIP CRM PWD F

## (undated) DEVICE — ENDOPATH 5MM CURVED SCISSORS WITH MONOPOLAR CAUTERY: Brand: ENDOPATH

## (undated) DEVICE — GLOVE SUR 7 SENSICARE PI PIP CRM PWD F

## (undated) DEVICE — NEEDLE SPINAL 22X3-1/2 BLK

## (undated) DEVICE — SOLUTION IRRIG 3000ML 0.9% NACL FLX CONT

## (undated) DEVICE — STERILE POLYISOPRENE POWDER-FREE SURGICAL GLOVES: Brand: PROTEXIS

## (undated) DEVICE — LACTATED R 1000ML INJ

## (undated) DEVICE — TROCAR: Brand: KII® SLEEVE

## (undated) DEVICE — GYN CDS: Brand: MEDLINE INDUSTRIES, INC.

## (undated) DEVICE — 3M™ STERI-STRIP™ REINFORCED ADHESIVE SKIN CLOSURES, R1547, 1/2 IN X 4 IN (12 MM X 100 MM), 6 STRIPS/ENVELOPE: Brand: 3M™ STERI-STRIP™

## (undated) DEVICE — SUT MCRYL 4-0 18IN PS-2 ABSRB UD 19MM 3/8 CIR

## (undated) DEVICE — LAPAROVUE VISIBILITY SYSTEM LAPAROSCOPIC SOLUTIONS: Brand: LAPAROVUE

## (undated) DEVICE — GLOVE SURG TRIUMPH SZ 71/2

## (undated) DEVICE — PLUMEPORT ACTIV LAPAROSCOPIC SMOKE FILTRATION DEVICE: Brand: PLUMEPORT ACTIVE

## (undated) DEVICE — INSUFFLATION NEEDLE TO ESTABLISH PNEUMOPERITONEUM.: Brand: INSUFFLATION NEEDLE

## (undated) DEVICE — GAMMEX® NON-LATEX SIZE 6.5, STERILE NEOPRENE POWDER-FREE SURGICAL GLOVE: Brand: GAMMEX

## (undated) DEVICE — 40580 - THE PINK PAD - ADVANCED TRENDELENBURG POSITIONING KIT: Brand: 40580 - THE PINK PAD - ADVANCED TRENDELENBURG POSITIONING KIT

## (undated) DEVICE — KENDALL SCD EXPRESS SLEEVES, KNEE LENGTH, MEDIUM: Brand: KENDALL SCD

## (undated) DEVICE — 3M™ TEGADERM™ +PAD FILM DRESSING WITH NON-ADHERENT PAD, 3587, 3-1/2 IN X 4-1/8 IN (9 CM X 10.5 CM), 25/CAR, 4 CAR/CS: Brand: 3M™ TEGADERM™

## (undated) DEVICE — GYN LAP/ROBOTIC: Brand: MEDLINE INDUSTRIES, INC.

## (undated) NOTE — LETTER
Date: 10/10/2017    Patient Name: Faehem Rob          To Whom it may concern: This letter has been written at the patient's request. The above patient was seen at the [facility] for treatment of a medical condition.     Capri Vines was

## (undated) NOTE — LETTER
OUTSIDE TESTING RESULT REQUEST     IMPORTANT: FOR YOUR IMMEDIATE ATTENTION  Please FAX all test results listed below to: [phone]     Testing already done on or about: 2025     * * * * If testing is NOT complete, arrange with patient A.S.A.P. * * * *      Patient Name: Lisbeth Bowens  Surgery Date: 2025  Medical Record: [id number]  CSN: [id number]  : 1990 - A: 35 y     Sex: female  Surgeon(s):  Jaspal Gold, Leo DUBON MD  Procedure: LAPAROSCOPY, EXCISON OF ENDOMETRIOSIS, LYSIS OF ADHESIONS, POSSIBLE TUBOPLASTY, HYSTEROSCOPY, TUBAL LAVAGE  Anesthesia Type: General     Surgeon: Jaspal Gold, Leo DUBON MD     The following Testing and Time Line are REQUIRED PER ANESTHESIA     CBC [with Differential & Platelets] within  90 days  CMP (requires 4 hour fast) within  90 days  Hepatitis B Antigen   Hepatitis C (HCV Antibody)  HIV 1/2 Single Assay   BHCG QUANT      Thank You,   Sent by: Nicole FORD

## (undated) NOTE — MR AVS SNAPSHOT
[address] [facility]  [address] [facility], [address] [address] [phone]               Thank you for choosing us for your health care visit with Demi Galeana DO.   We are glad to serve you and happy to provide you with Juarez Estrada ? Patient must present photo ID at time of . If a designated family member will be picking up prescription, office must be given name of individual in advance and they must present an ID as well. ?  The name of the person picking up your prescripti Bactrim [Sulfamethoxazole W/Trimethoprim] Jittery    Hot, sweaty    Cephalosporins     Duricef [Cefadroxil Monohydrate]     hives                Today's Vital Signs     BP Pulse Weight             110/82 mmHg 82 157 lb            Current Medications You can access your MyChart to more actively manage your health care and view more details from this visit by going to [email or web address].   If you've recently had a stay at the Hospital you can access your discharge instructions in [address] by nadir

## (undated) NOTE — LETTER
[facility]  [address] [facility], [address] [place] [address]    Consent for Operation    Date: __________________    Time: _______________    1.  I authorize the performance upon Clyde Cones the following operation:                              Kenn Marsh procedure has been videotaped, the surgeon will obtain the original videotape. The hospital will not be responsible for storage or maintenance of this tape.     6. For the purpose of advancing medical education, I consent to the admittance of observers to t STATEMENTS REQUIRING INSERTION OR COMPLETION WERE FILLED IN.     Signature of Patient:   ___________________________    When the patient is a minor or mentally incompetent to give consent:  Signature of person authorized to consent for patient: ____________ · If I am allergic to anything or have had a reaction to anesthesia before. 3. I understand how the anesthesia medicine will help me (benefits). 4. I understand that with any type of anesthesia medicine there are risks:  a.  The most common risks are: their representative has agreed to have anesthesia services.     _____________________________________________________________________________  Witness        Date   Time  I have verified that the signature is that of the patient or patient’s representative

## (undated) NOTE — MR AVS SNAPSHOT
[facility] [address] [place] [address] [facility] [address] [phone]               Thank you for choosing us for your health care visit with ELE Alvares.   We are glad to serve you and happy to provide you with this summary of yo ABRS may be diagnosed if you’ve had an upper respiratory infection like a cold and cough for longer than 10 to 14 days. Your health care provider will ask about your symptoms and your medical history.  The provider will check your vital signs, including you Allergies as of Apr 02, 2017     Bactrim [Sulfamethoxazole W/Trimethoprim] Jittery    Hot, sweaty    Duricef [Cefadroxil Monohydrate]     hives                Today's Vital Signs     BP Pulse Temp Height Weight BMI    112/70 mmHg 104 98 °F (36.7 °C) ( If you've recently had a stay at the Hospital you can access your discharge instructions in Seat 14A by going to Visits < Admission Summaries.  If you've been to the Emergency Department or your doctor's office, you can view your past visit information in My Visit [facility] online at  [email or web address]

## (undated) NOTE — LETTER
Date: 1/22/2020    Patient Name: Markus Garcia          To Whom it may concern: This letter has been written at the patient's request. The above patient was seen at the [facility] for treatment of a medical condition.     Lilian Media is

## (undated) NOTE — Clinical Note
Dear Beryl Escalera MD Our mutual Shyann Love  is participating in our medical weight-loss program. We have been working together on making lifestyle changes regarding nutrition, behaviors, and physical activity.   Please feel free to contact me with

## (undated) NOTE — ED AVS SNAPSHOT
Trinidad Mccray   MRN: [id number]    Department:  [facility] Emergency Department   Date of Visit:  5/17/2019           Disclosure     Insurance plans vary and the physician(s) referred by the ER may not be covered by your plan.  Please contact tell this physician (or your personal doctor if your instructions are to return to your personal doctor) about any new or lasting problems. The primary care or specialist physician will see patients referred from the [facility] Emergency Department.  Leandro Schwab

## (undated) NOTE — ED AVS SNAPSHOT
Rice Reason   MRN: [id number]    Department:  [facility] Emergency Department   Date of Visit:  9/2/2018           Disclosure     Insurance plans vary and the physician(s) referred by the ER may not be covered by your plan.  Please contact tell this physician (or your personal doctor if your instructions are to return to your personal doctor) about any new or lasting problems. The primary care or specialist physician will see patients referred from the [facility] Emergency Department.  Etelvina Crockett

## (undated) NOTE — LETTER
Date & Time: 11/23/2022, 10:32 AM  Patient: Marjorie Dominique  Encounter Provider(s):    ANA LUISA Arango       To Whom It May Concern:    Gallito Akbar was seen and treated in our department on 11/23/2022. She should not return to work until fever free for 24 hours without medications. If you have any questions or concerns, please do not hesitate to call.         Elliot Carson FNP-C

## (undated) NOTE — Clinical Note
Dear Sincer,  I had the opportunity to see your patient Lisbeth Bowens recently. I appreciate your confidence in me to care for your patients. Please feel free call me with any questions at [phone] or contact me through Epic.  Sincerely, Jeison Wise MD Board Certified, Physical Medicine and Rehabilitation Specializing in Sports Medicine, Spine Medicine and Electrodiagnostic Medicine [facility]  CC: Dr. Clifton

## (undated) NOTE — Clinical Note
Dear Dr. Alfonso Reid,  Thank you for referring Calista Rinne to the [facility] in [facility].   Sincerely,  Franchesca Perez NP

## (undated) NOTE — Clinical Note
Dear Dr. Ellis Vallejo,  Thank you for referring Lenny Soto to the [facility] in [place]. Lenny Soto was advised to contact your office for a follow up visit.   She was seen in this clinic on 10/10/17, 11/27/17 and 12/12/17 for Sinus Infections and

## (undated) NOTE — Clinical Note
Dear Dr. Sriram Nunez,  Thank you for referring Padmaja Jackson to the [facility] in [place]. She was asked to follow up with you.   Sincerely,  Nanda Murphy NP

## (undated) NOTE — LETTER
Date & Time: 11/23/2022, 10:33 AM  Patient: Jordy Rico  Encounter Provider(s):    ANA LUISA Gomes       To Whom It May Concern:    Joselyn Bowers was seen and treated in our department on 11/23/2022. She has been ill for a couple of days. She should not return to school until fever free for 24 hours without medications.     If you have any questions or concerns, please do not hesitate to call.        _____________________________  Physician/APC Signature

## (undated) NOTE — LETTER
[facility]  [address] [facility], [address] [place] [address]    Consent for Operation    Date: __________________    Time: _______________    1.  I authorize the performance upon Patrick Lawrence the following operation:    Procedure(s):  LOOP ELECTRICAL procedure has been videotaped, the surgeon will obtain the original videotape. The hospital will not be responsible for storage or maintenance of this tape.     6. For the purpose of advancing medical education, I consent to the admittance of observers to t STATEMENTS REQUIRING INSERTION OR COMPLETION WERE FILLED IN.     Signature of Patient:   ___________________________    When the patient is a minor or mentally incompetent to give consent:  Signature of person authorized to consent for patient: ____________ supplements, and pills I can buy without a prescription (including street drugs/illegal medications). Failure to inform my anesthesiologist about these medicines may increase my risk of anesthetic complications.   · If I am allergic to anything or have had Anesthesiologist Signature     Date   Time  I have discussed the procedure and information above with the patient (or patient’s representative) and answered their questions. The patient or their representative has agreed to have anesthesia services.     ___